# Patient Record
Sex: MALE | Race: WHITE | NOT HISPANIC OR LATINO | Employment: FULL TIME | URBAN - METROPOLITAN AREA
[De-identification: names, ages, dates, MRNs, and addresses within clinical notes are randomized per-mention and may not be internally consistent; named-entity substitution may affect disease eponyms.]

---

## 2017-01-17 RX ORDER — BENAZEPRIL HYDROCHLORIDE 20 MG/1
TABLET ORAL
Qty: 30 TABLET | Refills: 0 | Status: SHIPPED | OUTPATIENT
Start: 2017-01-17 | End: 2017-02-26 | Stop reason: SDUPTHER

## 2017-02-02 RX ORDER — BENAZEPRIL HYDROCHLORIDE 20 MG/1
TABLET ORAL
Qty: 30 TABLET | Refills: 0 | OUTPATIENT
Start: 2017-02-02

## 2017-02-27 RX ORDER — BENAZEPRIL HYDROCHLORIDE 20 MG/1
TABLET ORAL
Qty: 30 TABLET | Refills: 0 | Status: SHIPPED | OUTPATIENT
Start: 2017-02-27 | End: 2018-05-03 | Stop reason: ALTCHOICE

## 2017-09-05 ENCOUNTER — OFFICE VISIT (OUTPATIENT)
Dept: SPORTS MEDICINE | Facility: CLINIC | Age: 62
End: 2017-09-05

## 2017-09-05 VITALS
OXYGEN SATURATION: 98 % | BODY MASS INDEX: 28.14 KG/M2 | DIASTOLIC BLOOD PRESSURE: 84 MMHG | SYSTOLIC BLOOD PRESSURE: 112 MMHG | WEIGHT: 201 LBS | HEIGHT: 71 IN | HEART RATE: 78 BPM

## 2017-09-05 DIAGNOSIS — E78.00 PURE HYPERCHOLESTEROLEMIA: ICD-10-CM

## 2017-09-05 DIAGNOSIS — Z11.59 NEED FOR HEPATITIS C SCREENING TEST: ICD-10-CM

## 2017-09-05 DIAGNOSIS — I10 ESSENTIAL HYPERTENSION: ICD-10-CM

## 2017-09-05 DIAGNOSIS — Z12.11 SCREEN FOR COLON CANCER: ICD-10-CM

## 2017-09-05 DIAGNOSIS — E11.9 TYPE 2 DIABETES MELLITUS WITHOUT COMPLICATION, WITHOUT LONG-TERM CURRENT USE OF INSULIN (HCC): ICD-10-CM

## 2017-09-05 DIAGNOSIS — Z00.00 PREVENTATIVE HEALTH CARE: Primary | ICD-10-CM

## 2017-09-05 PROBLEM — K63.5 COLON POLYP: Status: ACTIVE | Noted: 2017-09-05

## 2017-09-05 PROBLEM — E78.5 HYPERLIPIDEMIA: Status: ACTIVE | Noted: 2017-09-05

## 2017-09-05 PROCEDURE — 99396 PREV VISIT EST AGE 40-64: CPT | Performed by: FAMILY MEDICINE

## 2017-09-05 RX ORDER — EMPAGLIFLOZIN, METFORMIN HYDROCHLORIDE 12.5; 1 MG/1; MG/1
2 TABLET, EXTENDED RELEASE ORAL DAILY
COMMUNITY
Start: 2017-08-04 | End: 2019-04-06 | Stop reason: ALTCHOICE

## 2017-09-05 RX ORDER — ATORVASTATIN CALCIUM 20 MG/1
20 TABLET, FILM COATED ORAL DAILY
COMMUNITY
Start: 2017-08-04

## 2017-09-05 RX ORDER — GLIMEPIRIDE 2 MG/1
2 TABLET ORAL 2 TIMES DAILY
COMMUNITY
Start: 2017-08-04

## 2017-09-05 NOTE — PROGRESS NOTES
Billy Bourne is here today for an annual physical exam.     Eating a healthy diet. Exercising routinely.     Sees Dr Figueroa for AODM    I have reviewed the patient's medical, family, and social history in detail and updated the computerized patient record.    Screening history:  Colonoscopy - due  Prostate - last year  Metabolic - last year    Health Maintenance   Topic Date Due   • TDAP/TD VACCINES (1 - Tdap) 09/16/1974   • INFLUENZA VACCINE  09/01/2017   • HEPATITIS C SCREENING  09/05/2017   • COLONOSCOPY  09/05/2017   • ZOSTER VACCINE  09/05/2017   • HEMOGLOBIN A1C  02/17/2018   • DIABETIC FOOT EXAM  08/17/2018   • LIPID PANEL  08/17/2018   • DIABETIC EYE EXAM  08/17/2018   • URINE MICROALBUMIN  08/17/2018   • PNEUMOCOCCAL VACCINE (19-64 MEDIUM RISK)  Completed       Review of Systems   Constitutional: Negative for activity change, appetite change, chills, diaphoresis, fatigue, fever and unexpected weight change.   HENT: Negative for congestion, ear pain, postnasal drip, rhinorrhea, sinus pressure, sneezing, sore throat and trouble swallowing.    Eyes: Negative for visual disturbance.   Respiratory: Negative for cough, chest tightness, shortness of breath and wheezing.    Cardiovascular: Negative for chest pain and palpitations.   Gastrointestinal: Negative for abdominal pain, blood in stool, nausea and vomiting.   Endocrine: Negative for cold intolerance, polydipsia, polyphagia and polyuria.   Genitourinary: Negative for dysuria, flank pain, frequency, hematuria and urgency.   Musculoskeletal: Negative for arthralgias, back pain, joint swelling and myalgias.   Skin: Negative for rash.   Allergic/Immunologic: Negative for environmental allergies.   Neurological: Negative for dizziness, syncope, weakness, numbness and headaches.   Hematological: Negative for adenopathy. Does not bruise/bleed easily.   Psychiatric/Behavioral: Negative for agitation, decreased concentration, dysphoric mood, sleep disturbance and  "suicidal ideas. The patient is not nervous/anxious.        /84  Pulse 78  Ht 71\" (180.3 cm)  Wt 201 lb (91.2 kg)  SpO2 98%  BMI 28.03 kg/m2     Physical Exam    Vital signs reviewed.  General appearance: No acute distress  Eyes: conjunctiva clear without erythema; pupils equally round and reactive  ENT: external ears and nose normal; hearing normal, oropharynx clear  Neck: supple; no thyromegaly  CV: normal rate and rhythm; no peripheral edema  Respiratory: normal respiratory effort; lungs clear to auscultation bilaterally  MSK: normal gait and station; no focal joint deformity or swelling  Skin: no rash or wounds; normal turgor  Neuro: cranial nerves 2-12 grossly intact; normal sensation to light touch  Psych: mood and affect normal; recent and remote memory intact    Review labs from [facility] 8/17/2017.     Diagnoses and all orders for this visit:    Preventative health care    Pure hypercholesterolemia  -     TSH+Free T4    Essential hypertension  -     TSH+Free T4  -     Urinalysis With / Culture If Indicated    Type 2 diabetes mellitus without complication, without long-term current use of insulin  -     TSH+Free T4    Need for hepatitis C screening test  -     Hepatitis C Antibody    Screen for colon cancer    Other orders  -     Cancel: CBC & Differential  -     Cancel: Comprehensive Metabolic Panel  -     Cancel: Lipid Panel With / Chol / HDL Ratio        Encourage healthy diet and exercise.  Encourage patient to stay up to date on screening examinations as indicated based on age and risk factors.  "

## 2017-09-06 LAB
APPEARANCE UR: CLEAR
BACTERIA #/AREA URNS HPF: NORMAL /HPF
BILIRUB UR QL STRIP: NEGATIVE
COLOR UR: YELLOW
EPI CELLS #/AREA URNS HPF: NORMAL /HPF
GLUCOSE UR QL: ABNORMAL
HCV AB S/CO SERPL IA: <0.1 S/CO RATIO (ref 0–0.9)
HGB UR QL STRIP: NEGATIVE
KETONES UR QL STRIP: NEGATIVE
LEUKOCYTE ESTERASE UR QL STRIP: NEGATIVE
MICRO URNS: ABNORMAL
MICRO URNS: ABNORMAL
MUCOUS THREADS URNS QL MICRO: PRESENT /HPF
NITRITE UR QL STRIP: NEGATIVE
PH UR STRIP: 6 [PH] (ref 5–7.5)
PROT UR QL STRIP: ABNORMAL
PSA SERPL-MCNC: 2.62 NG/ML (ref 0–4)
RBC #/AREA URNS HPF: NORMAL /HPF
SP GR UR: >=1.03 (ref 1–1.03)
T4 FREE SERPL-MCNC: 1.08 NG/DL (ref 0.93–1.7)
TSH SERPL DL<=0.005 MIU/L-ACNC: 3.47 MIU/ML (ref 0.27–4.2)
URINALYSIS REFLEX: ABNORMAL
UROBILINOGEN UR STRIP-MCNC: 0.2 MG/DL (ref 0.2–1)
WBC #/AREA URNS HPF: NORMAL /HPF

## 2017-10-29 ENCOUNTER — APPOINTMENT (OUTPATIENT)
Dept: CT IMAGING | Facility: HOSPITAL | Age: 62
End: 2017-10-29

## 2017-10-29 ENCOUNTER — HOSPITAL ENCOUNTER (EMERGENCY)
Facility: HOSPITAL | Age: 62
Discharge: HOME | End: 2017-10-29
Attending: EMERGENCY MEDICINE | Admitting: EMERGENCY MEDICINE

## 2017-10-29 VITALS
WEIGHT: 195 LBS | HEART RATE: 101 BPM | DIASTOLIC BLOOD PRESSURE: 121 MMHG | TEMPERATURE: 99.2 F | RESPIRATION RATE: 18 BRPM | OXYGEN SATURATION: 95 % | BODY MASS INDEX: 27.3 KG/M2 | HEIGHT: 71 IN | SYSTOLIC BLOOD PRESSURE: 188 MMHG

## 2017-10-29 DIAGNOSIS — R56.9 SEIZURE (HCC): Primary | ICD-10-CM

## 2017-10-29 DIAGNOSIS — S00.01XA ABRASION OF SCALP, INITIAL ENCOUNTER: ICD-10-CM

## 2017-10-29 LAB
ALBUMIN SERPL-MCNC: 4.6 G/DL (ref 3.5–5.2)
ALBUMIN/GLOB SERPL: 1.7 G/DL
ALP SERPL-CCNC: 68 U/L (ref 39–117)
ALT SERPL W P-5'-P-CCNC: 26 U/L (ref 1–41)
ANION GAP SERPL CALCULATED.3IONS-SCNC: 19.1 MMOL/L
AST SERPL-CCNC: 23 U/L (ref 1–40)
BASOPHILS # BLD AUTO: 0.02 10*3/MM3 (ref 0–0.2)
BASOPHILS NFR BLD AUTO: 0.2 % (ref 0–1.5)
BILIRUB SERPL-MCNC: 0.4 MG/DL (ref 0.1–1.2)
BUN BLD-MCNC: 16 MG/DL (ref 8–23)
BUN/CREAT SERPL: 17.4 (ref 7–25)
CALCIUM SPEC-SCNC: 9.2 MG/DL (ref 8.6–10.5)
CHLORIDE SERPL-SCNC: 101 MMOL/L (ref 98–107)
CO2 SERPL-SCNC: 21.9 MMOL/L (ref 22–29)
CREAT BLD-MCNC: 0.92 MG/DL (ref 0.76–1.27)
DEPRECATED RDW RBC AUTO: 43 FL (ref 37–54)
EOSINOPHIL # BLD AUTO: 0.06 10*3/MM3 (ref 0–0.7)
EOSINOPHIL NFR BLD AUTO: 0.6 % (ref 0.3–6.2)
ERYTHROCYTE [DISTWIDTH] IN BLOOD BY AUTOMATED COUNT: 13.4 % (ref 11.5–14.5)
GFR SERPL CREATININE-BSD FRML MDRD: 83 ML/MIN/1.73
GLOBULIN UR ELPH-MCNC: 2.7 GM/DL
GLUCOSE BLD-MCNC: 135 MG/DL (ref 65–99)
HCT VFR BLD AUTO: 44 % (ref 40.4–52.2)
HGB BLD-MCNC: 14.9 G/DL (ref 13.7–17.6)
HOLD SPECIMEN: NORMAL
HOLD SPECIMEN: NORMAL
IMM GRANULOCYTES # BLD: 0.03 10*3/MM3 (ref 0–0.03)
IMM GRANULOCYTES NFR BLD: 0.3 % (ref 0–0.5)
LYMPHOCYTES # BLD AUTO: 2.32 10*3/MM3 (ref 0.9–4.8)
LYMPHOCYTES NFR BLD AUTO: 23.6 % (ref 19.6–45.3)
MAGNESIUM SERPL-MCNC: 2.1 MG/DL (ref 1.6–2.4)
MCH RBC QN AUTO: 29.9 PG (ref 27–32.7)
MCHC RBC AUTO-ENTMCNC: 33.9 G/DL (ref 32.6–36.4)
MCV RBC AUTO: 88.4 FL (ref 79.8–96.2)
MONOCYTES # BLD AUTO: 0.57 10*3/MM3 (ref 0.2–1.2)
MONOCYTES NFR BLD AUTO: 5.8 % (ref 5–12)
NEUTROPHILS # BLD AUTO: 6.83 10*3/MM3 (ref 1.9–8.1)
NEUTROPHILS NFR BLD AUTO: 69.5 % (ref 42.7–76)
PHOSPHATE SERPL-MCNC: 3.5 MG/DL (ref 2.5–4.5)
PLATELET # BLD AUTO: 190 10*3/MM3 (ref 140–500)
PMV BLD AUTO: 11.3 FL (ref 6–12)
POTASSIUM BLD-SCNC: 3.7 MMOL/L (ref 3.5–5.2)
PROT SERPL-MCNC: 7.3 G/DL (ref 6–8.5)
RBC # BLD AUTO: 4.98 10*6/MM3 (ref 4.6–6)
SODIUM BLD-SCNC: 142 MMOL/L (ref 136–145)
TROPONIN T SERPL-MCNC: 0.02 NG/ML (ref 0–0.03)
WBC NRBC COR # BLD: 9.83 10*3/MM3 (ref 4.5–10.7)
WHOLE BLOOD HOLD SPECIMEN: NORMAL
WHOLE BLOOD HOLD SPECIMEN: NORMAL

## 2017-10-29 PROCEDURE — 93005 ELECTROCARDIOGRAM TRACING: CPT

## 2017-10-29 PROCEDURE — 99285 EMERGENCY DEPT VISIT HI MDM: CPT

## 2017-10-29 PROCEDURE — 93010 ELECTROCARDIOGRAM REPORT: CPT | Performed by: INTERNAL MEDICINE

## 2017-10-29 PROCEDURE — 80053 COMPREHEN METABOLIC PANEL: CPT | Performed by: EMERGENCY MEDICINE

## 2017-10-29 PROCEDURE — 83735 ASSAY OF MAGNESIUM: CPT | Performed by: EMERGENCY MEDICINE

## 2017-10-29 PROCEDURE — 84100 ASSAY OF PHOSPHORUS: CPT | Performed by: EMERGENCY MEDICINE

## 2017-10-29 PROCEDURE — 70450 CT HEAD/BRAIN W/O DYE: CPT

## 2017-10-29 PROCEDURE — 85025 COMPLETE CBC W/AUTO DIFF WBC: CPT | Performed by: EMERGENCY MEDICINE

## 2017-10-29 PROCEDURE — 84484 ASSAY OF TROPONIN QUANT: CPT | Performed by: EMERGENCY MEDICINE

## 2017-10-29 RX ORDER — SODIUM CHLORIDE 0.9 % (FLUSH) 0.9 %
10 SYRINGE (ML) INJECTION AS NEEDED
Status: DISCONTINUED | OUTPATIENT
Start: 2017-10-29 | End: 2017-10-30 | Stop reason: HOSPADM

## 2017-10-30 ENCOUNTER — OFFICE VISIT (OUTPATIENT)
Dept: SPORTS MEDICINE | Facility: CLINIC | Age: 62
End: 2017-10-30

## 2017-10-30 VITALS
HEIGHT: 71 IN | OXYGEN SATURATION: 96 % | HEART RATE: 94 BPM | BODY MASS INDEX: 28 KG/M2 | SYSTOLIC BLOOD PRESSURE: 114 MMHG | DIASTOLIC BLOOD PRESSURE: 80 MMHG | WEIGHT: 200 LBS

## 2017-10-30 DIAGNOSIS — R55 SYNCOPE, UNSPECIFIED SYNCOPE TYPE: Primary | ICD-10-CM

## 2017-10-30 PROCEDURE — 99214 OFFICE O/P EST MOD 30 MIN: CPT | Performed by: FAMILY MEDICINE

## 2017-10-30 NOTE — PROGRESS NOTES
"Billy is a 62 y.o. year old male    [facility]    History of Present Illness   HPI   Patient is here to follow-up ER visit from yesterday.  Yesterday patient was in his usual state of health, was at the mall and as he was coming down the escalator he does recall having some trouble focusing his vision, after that he does not remember much but evidently he had a possible syncopal episode.  EMS was called, when they arrived patient was flailing his arms and legs and asking him what had happened and what time it was.  He did not recall fighting his tongue, does not have a sore tongue, no bleeding.  There was no evidence of bowel or bladder incontinence.  Patient was taken to the emergency room, I have reviewed those records and labs including CT scan and all of which were within normal limits.  I did discuss with the ER physician yesterday to have the patient follow-up with us here and he also will be making his own appointment with neurology.  Patient does not recall much about the incident itself.  Patient has had previous syncopal workup that was negative.  Patient states he feels fine now.  At the mall there evidently was \"a doctor \"that related to EMS that saw seizure activity but he did not stay for EMS to get there. Not sure if he actually was an MD.     I have reviewed the patient's medical, family, and social history in detail and updated the computerized patient record.    Review of Systems   Constitutional: Negative.    HENT: Negative.    Respiratory: Negative.    Cardiovascular: Negative.    Gastrointestinal: Negative.    Genitourinary: Negative.    Neurological:        Per history of present illness       /80  Pulse 94  Ht 71\" (180.3 cm)  Wt 200 lb (90.7 kg)  SpO2 96%  BMI 27.89 kg/m2     Physical Exam   Constitutional: He is oriented to person, place, and time. He appears well-developed and well-nourished.   HENT:   Head: Normocephalic and atraumatic.   Bilateral cerumen impaction.    Oropharynx, " there is evidence of some teeth trauma on the lateral edge of the left side of the tongue but he states this is not sore nor has it bled.   Eyes: Conjunctivae and EOM are normal. Pupils are equal, round, and reactive to light.   Neck: Normal range of motion. Neck supple. No thyromegaly present.   Cardiovascular: Normal rate, regular rhythm and normal heart sounds.    No peripheral edema   Pulmonary/Chest: Effort normal and breath sounds normal.   Lymphadenopathy:     He has no cervical adenopathy.   Neurological: He is alert and oriented to person, place, and time.   Skin: Skin is warm, dry and intact.   Psychiatric: He has a normal mood and affect. His behavior is normal. Thought content normal.   Vitals reviewed.       Diagnoses and all orders for this visit:    Syncope, unspecified syncope type  -     Ambulatory Referral to Cardiology  -     EEG; Future      It would be really difficult to say for sure what this episode was, whether he had a syncopal episode that may have caused a brief seizure-like activity or if it was a seizure itself.  We'll refer to cardiology for workup of syncope but also he will call his neurologist for an appointment.    EMR Dragon/Transcription disclaimer:    Much of this encounter note is an electronic transcription/translation of spoken language to printed text.  The electronic translation of spoken language may permit erroneous, or at times, nonsensical words or phrases to be inadvertently transcribed.  Although I have reviewed the note for such errors some may still exist.

## 2017-11-01 ENCOUNTER — TELEPHONE (OUTPATIENT)
Dept: SOCIAL WORK | Facility: HOSPITAL | Age: 62
End: 2017-11-01

## 2017-11-01 NOTE — TELEPHONE ENCOUNTER
Spoke with pt today in f/u and he states he is better. He had a f/u with his PCP this Monday, has a f/u with his cardiologist Dec. 1 and is waiting for Dr. Lazo's office to call back with his f/u appt there. No other questions or concerns voiced by pt at this time. Holli ASENCIO

## 2017-11-02 DIAGNOSIS — R55 SYNCOPE, UNSPECIFIED SYNCOPE TYPE: Primary | ICD-10-CM

## 2017-11-07 ENCOUNTER — TELEPHONE (OUTPATIENT)
Dept: SPORTS MEDICINE | Facility: CLINIC | Age: 62
End: 2017-11-07

## 2017-11-07 NOTE — TELEPHONE ENCOUNTER
Patient needs an order for an EEG before Neuro will see him. Can you please input that for him?

## 2017-11-10 ENCOUNTER — TELEPHONE (OUTPATIENT)
Dept: SPORTS MEDICINE | Facility: CLINIC | Age: 62
End: 2017-11-10

## 2017-11-10 DIAGNOSIS — R55 SYNCOPE, UNSPECIFIED SYNCOPE TYPE: Primary | ICD-10-CM

## 2017-11-10 NOTE — TELEPHONE ENCOUNTER
Can you get EEG scheduled asap, Dr. Stuart accidentally put the order in as clinic performed instead of hospital performed, neuro is waiting for this to be done first before they will see him.

## 2017-11-14 ENCOUNTER — APPOINTMENT (OUTPATIENT)
Dept: NEUROLOGY | Facility: HOSPITAL | Age: 62
End: 2017-11-14

## 2017-12-01 ENCOUNTER — OFFICE VISIT (OUTPATIENT)
Dept: CARDIOLOGY | Facility: CLINIC | Age: 62
End: 2017-12-01

## 2017-12-01 VITALS
SYSTOLIC BLOOD PRESSURE: 140 MMHG | WEIGHT: 199 LBS | HEIGHT: 71 IN | BODY MASS INDEX: 27.86 KG/M2 | DIASTOLIC BLOOD PRESSURE: 88 MMHG | HEART RATE: 86 BPM

## 2017-12-01 DIAGNOSIS — R55 VASOVAGAL SYNCOPE: Primary | ICD-10-CM

## 2017-12-01 PROCEDURE — 99204 OFFICE O/P NEW MOD 45 MIN: CPT | Performed by: INTERNAL MEDICINE

## 2017-12-01 PROCEDURE — 93000 ELECTROCARDIOGRAM COMPLETE: CPT | Performed by: INTERNAL MEDICINE

## 2017-12-04 DIAGNOSIS — R97.20 RISING PSA LEVEL: Primary | ICD-10-CM

## 2017-12-07 ENCOUNTER — HOSPITAL ENCOUNTER (OUTPATIENT)
Dept: CARDIOLOGY | Facility: HOSPITAL | Age: 62
Discharge: HOME | End: 2017-12-07
Attending: INTERNAL MEDICINE | Admitting: INTERNAL MEDICINE

## 2017-12-07 VITALS
HEART RATE: 75 BPM | BODY MASS INDEX: 28.41 KG/M2 | DIASTOLIC BLOOD PRESSURE: 70 MMHG | WEIGHT: 198.41 LBS | SYSTOLIC BLOOD PRESSURE: 118 MMHG | HEIGHT: 70 IN

## 2017-12-07 DIAGNOSIS — R55 VASOVAGAL SYNCOPE: ICD-10-CM

## 2017-12-07 LAB
BH CV ECHO MEAS - ACS: 1.9 CM
BH CV ECHO MEAS - AO MAX PG (FULL): 5.2 MMHG
BH CV ECHO MEAS - AO MAX PG: 10.9 MMHG
BH CV ECHO MEAS - AO MEAN PG (FULL): 2.4 MMHG
BH CV ECHO MEAS - AO MEAN PG: 6 MMHG
BH CV ECHO MEAS - AO ROOT AREA (BSA CORRECTED): 1.5
BH CV ECHO MEAS - AO ROOT AREA: 7.9 CM^2
BH CV ECHO MEAS - AO ROOT DIAM: 3.2 CM
BH CV ECHO MEAS - AO V2 MAX: 164.8 CM/SEC
BH CV ECHO MEAS - AO V2 MEAN: 113.2 CM/SEC
BH CV ECHO MEAS - AO V2 VTI: 29.9 CM
BH CV ECHO MEAS - AVA(I,A): 1.9 CM^2
BH CV ECHO MEAS - AVA(I,D): 1.9 CM^2
BH CV ECHO MEAS - AVA(V,A): 1.8 CM^2
BH CV ECHO MEAS - AVA(V,D): 1.8 CM^2
BH CV ECHO MEAS - BSA(HAYCOCK): 2.1 M^2
BH CV ECHO MEAS - BSA: 2.1 M^2
BH CV ECHO MEAS - BZI_BMI: 28.8 KILOGRAMS/M^2
BH CV ECHO MEAS - BZI_METRIC_HEIGHT: 177 CM
BH CV ECHO MEAS - BZI_METRIC_WEIGHT: 90.3 KG
BH CV ECHO MEAS - CONTRAST EF (2CH): 59.1 ML/M^2
BH CV ECHO MEAS - CONTRAST EF 4CH: 52.5 ML/M^2
BH CV ECHO MEAS - EDV(MOD-SP2): 66 ML
BH CV ECHO MEAS - EDV(MOD-SP4): 61 ML
BH CV ECHO MEAS - EDV(TEICH): 105.5 ML
BH CV ECHO MEAS - EF(CUBED): 73 %
BH CV ECHO MEAS - EF(MOD-SP2): 59.1 %
BH CV ECHO MEAS - EF(MOD-SP4): 52.5 %
BH CV ECHO MEAS - EF(TEICH): 64.7 %
BH CV ECHO MEAS - ESV(MOD-SP2): 27 ML
BH CV ECHO MEAS - ESV(MOD-SP4): 29 ML
BH CV ECHO MEAS - ESV(TEICH): 37.3 ML
BH CV ECHO MEAS - FS: 35.3 %
BH CV ECHO MEAS - IVS/LVPW: 1.1
BH CV ECHO MEAS - IVSD: 1.4 CM
BH CV ECHO MEAS - LAT PEAK E' VEL: 8 CM/SEC
BH CV ECHO MEAS - LV DIASTOLIC VOL/BSA (35-75): 29.4 ML/M^2
BH CV ECHO MEAS - LV MASS(C)D: 259.2 GRAMS
BH CV ECHO MEAS - LV MASS(C)DI: 124.8 GRAMS/M^2
BH CV ECHO MEAS - LV MAX PG: 5.6 MMHG
BH CV ECHO MEAS - LV MEAN PG: 3.6 MMHG
BH CV ECHO MEAS - LV SYSTOLIC VOL/BSA (12-30): 14 ML/M^2
BH CV ECHO MEAS - LV V1 MAX: 118.8 CM/SEC
BH CV ECHO MEAS - LV V1 MEAN: 89.5 CM/SEC
BH CV ECHO MEAS - LV V1 VTI: 23 CM
BH CV ECHO MEAS - LVIDD: 4.8 CM
BH CV ECHO MEAS - LVIDS: 3.1 CM
BH CV ECHO MEAS - LVLD AP2: 7.1 CM
BH CV ECHO MEAS - LVLD AP4: 7.1 CM
BH CV ECHO MEAS - LVLS AP2: 6.7 CM
BH CV ECHO MEAS - LVLS AP4: 6.7 CM
BH CV ECHO MEAS - LVOT AREA (M): 2.5 CM^2
BH CV ECHO MEAS - LVOT AREA: 2.4 CM^2
BH CV ECHO MEAS - LVOT DIAM: 1.8 CM
BH CV ECHO MEAS - LVPWD: 1.3 CM
BH CV ECHO MEAS - MED PEAK E' VEL: 8 CM/SEC
BH CV ECHO MEAS - MV A DUR: 0.08 SEC
BH CV ECHO MEAS - MV A MAX VEL: 50.9 CM/SEC
BH CV ECHO MEAS - MV DEC SLOPE: 674.7 CM/SEC^2
BH CV ECHO MEAS - MV DEC TIME: 0.14 SEC
BH CV ECHO MEAS - MV E MAX VEL: 93.6 CM/SEC
BH CV ECHO MEAS - MV E/A: 1.8
BH CV ECHO MEAS - MV MAX PG: 9.7 MMHG
BH CV ECHO MEAS - MV MEAN PG: 2.8 MMHG
BH CV ECHO MEAS - MV P1/2T MAX VEL: 93.6 CM/SEC
BH CV ECHO MEAS - MV P1/2T: 40.6 MSEC
BH CV ECHO MEAS - MV V2 MAX: 156.1 CM/SEC
BH CV ECHO MEAS - MV V2 MEAN: 74.3 CM/SEC
BH CV ECHO MEAS - MV V2 VTI: 30 CM
BH CV ECHO MEAS - MVA P1/2T LCG: 2.4 CM^2
BH CV ECHO MEAS - MVA(P1/2T): 5.4 CM^2
BH CV ECHO MEAS - MVA(VTI): 1.9 CM^2
BH CV ECHO MEAS - PA MAX PG (FULL): 0.34 MMHG
BH CV ECHO MEAS - PA MAX PG: 2.5 MMHG
BH CV ECHO MEAS - PA V2 MAX: 78.3 CM/SEC
BH CV ECHO MEAS - PULM A REVS DUR: 0.08 SEC
BH CV ECHO MEAS - PULM A REVS VEL: 23.8 CM/SEC
BH CV ECHO MEAS - PULM DIAS VEL: 91.9 CM/SEC
BH CV ECHO MEAS - PULM S/D: 0.44
BH CV ECHO MEAS - PULM SYS VEL: 40 CM/SEC
BH CV ECHO MEAS - PVA(V,A): 2.8 CM^2
BH CV ECHO MEAS - PVA(V,D): 2.8 CM^2
BH CV ECHO MEAS - QP/QS: 0.82
BH CV ECHO MEAS - RV MAX PG: 2.1 MMHG
BH CV ECHO MEAS - RV MEAN PG: 1.3 MMHG
BH CV ECHO MEAS - RV V1 MAX: 72.8 CM/SEC
BH CV ECHO MEAS - RV V1 MEAN: 51.8 CM/SEC
BH CV ECHO MEAS - RV V1 VTI: 15.5 CM
BH CV ECHO MEAS - RVOT AREA: 3 CM^2
BH CV ECHO MEAS - RVOT DIAM: 1.9 CM
BH CV ECHO MEAS - SI(AO): 113.9 ML/M^2
BH CV ECHO MEAS - SI(CUBED): 37.9 ML/M^2
BH CV ECHO MEAS - SI(LVOT): 27 ML/M^2
BH CV ECHO MEAS - SI(MOD-SP2): 18.8 ML/M^2
BH CV ECHO MEAS - SI(MOD-SP4): 15.4 ML/M^2
BH CV ECHO MEAS - SI(TEICH): 32.9 ML/M^2
BH CV ECHO MEAS - SUP REN AO DIAM: 2 CM
BH CV ECHO MEAS - SV(AO): 236.4 ML
BH CV ECHO MEAS - SV(CUBED): 78.7 ML
BH CV ECHO MEAS - SV(LVOT): 56.1 ML
BH CV ECHO MEAS - SV(MOD-SP2): 39 ML
BH CV ECHO MEAS - SV(MOD-SP4): 32 ML
BH CV ECHO MEAS - SV(RVOT): 46.1 ML
BH CV ECHO MEAS - SV(TEICH): 68.2 ML
BH CV ECHO MEAS - TAPSE (>1.6): 1.9 CM2
BH CV XLRA - RV BASE: 2.8 CM
BH CV XLRA - TDI S': 16 CM/SEC
E/E' RATIO: 11
LEFT ATRIUM VOLUME INDEX: 37 ML/M2
LEFT ATRIUM VOLUME: 77 CM3
MAXIMAL PREDICTED HEART RATE: 158 BPM
STRESS TARGET HR: 134 BPM

## 2017-12-07 PROCEDURE — 0399T HC MYOCARDL STRAIN IMAG QUAN ASSMT PER SESS: CPT

## 2017-12-07 PROCEDURE — 0399T ADULT TRANSTHORACIC ECHO COMPLETE W/ CONT IF NECESSARY PER PROTOCOL: CPT | Performed by: INTERNAL MEDICINE

## 2017-12-07 PROCEDURE — 93306 TTE W/DOPPLER COMPLETE: CPT

## 2017-12-07 PROCEDURE — 93306 TTE W/DOPPLER COMPLETE: CPT | Performed by: INTERNAL MEDICINE

## 2017-12-07 NOTE — PROGRESS NOTES
No cause for syncope.  Mild to moderate mitral regurgitation.  Needs follow-up in one year. Patient is aware

## 2017-12-09 ENCOUNTER — RESULTS ENCOUNTER (OUTPATIENT)
Dept: SPORTS MEDICINE | Facility: CLINIC | Age: 62
End: 2017-12-09

## 2017-12-09 DIAGNOSIS — R97.20 RISING PSA LEVEL: ICD-10-CM

## 2018-01-05 ENCOUNTER — TELEPHONE (OUTPATIENT)
Dept: CARDIOLOGY | Facility: CLINIC | Age: 63
End: 2018-01-05

## 2018-01-05 NOTE — TELEPHONE ENCOUNTER
----- Message from Wm Deleon MD sent at 12/27/2017  2:43 PM EST -----  Please tell patient no rhythm cause for passing out on monitor

## 2018-04-20 ENCOUNTER — OFFICE VISIT (OUTPATIENT)
Dept: FAMILY MEDICINE CLINIC | Facility: CLINIC | Age: 63
End: 2018-04-20

## 2018-04-20 VITALS
HEART RATE: 81 BPM | WEIGHT: 198.4 LBS | BODY MASS INDEX: 27.77 KG/M2 | DIASTOLIC BLOOD PRESSURE: 84 MMHG | HEIGHT: 71 IN | OXYGEN SATURATION: 99 % | SYSTOLIC BLOOD PRESSURE: 148 MMHG

## 2018-04-20 DIAGNOSIS — R06.09 DOE (DYSPNEA ON EXERTION): ICD-10-CM

## 2018-04-20 DIAGNOSIS — R55 VASOVAGAL SYNCOPE: ICD-10-CM

## 2018-04-20 DIAGNOSIS — R05.9 COUGH: ICD-10-CM

## 2018-04-20 DIAGNOSIS — I10 ESSENTIAL HYPERTENSION: Primary | ICD-10-CM

## 2018-04-20 LAB
BASOPHILS # BLD AUTO: 0.02 10*3/MM3 (ref 0–0.2)
BASOPHILS NFR BLD AUTO: 0.3 % (ref 0–1.5)
EOSINOPHIL # BLD AUTO: 0.09 10*3/MM3 (ref 0–0.7)
EOSINOPHIL NFR BLD AUTO: 1.2 % (ref 0.3–6.2)
ERYTHROCYTE [DISTWIDTH] IN BLOOD BY AUTOMATED COUNT: 14.6 % (ref 11.5–14.5)
HCT VFR BLD AUTO: 47.2 % (ref 40.4–52.2)
HGB BLD-MCNC: 14.9 G/DL (ref 13.7–17.6)
IMM GRANULOCYTES # BLD: 0 10*3/MM3 (ref 0–0.03)
IMM GRANULOCYTES NFR BLD: 0 % (ref 0–0.5)
LYMPHOCYTES # BLD AUTO: 1.67 10*3/MM3 (ref 0.9–4.8)
LYMPHOCYTES NFR BLD AUTO: 21.5 % (ref 19.6–45.3)
MCH RBC QN AUTO: 29.3 PG (ref 27–32.7)
MCHC RBC AUTO-ENTMCNC: 31.6 G/DL (ref 32.6–36.4)
MCV RBC AUTO: 92.9 FL (ref 79.8–96.2)
MONOCYTES # BLD AUTO: 0.6 10*3/MM3 (ref 0.2–1.2)
MONOCYTES NFR BLD AUTO: 7.7 % (ref 5–12)
NEUTROPHILS # BLD AUTO: 5.4 10*3/MM3 (ref 1.9–8.1)
NEUTROPHILS NFR BLD AUTO: 69.3 % (ref 42.7–76)
PLATELET # BLD AUTO: 208 10*3/MM3 (ref 140–500)
RBC # BLD AUTO: 5.08 10*6/MM3 (ref 4.6–6)
WBC # BLD AUTO: 7.78 10*3/MM3 (ref 4.5–10.7)

## 2018-04-20 PROCEDURE — 93000 ELECTROCARDIOGRAM COMPLETE: CPT | Performed by: INTERNAL MEDICINE

## 2018-04-20 PROCEDURE — 99215 OFFICE O/P EST HI 40 MIN: CPT | Performed by: INTERNAL MEDICINE

## 2018-04-20 PROCEDURE — 71046 X-RAY EXAM CHEST 2 VIEWS: CPT | Performed by: INTERNAL MEDICINE

## 2018-04-20 RX ORDER — FLUCONAZOLE 150 MG/1
150 TABLET ORAL DAILY PRN
Refills: 0 | COMMUNITY
Start: 2018-03-14 | End: 2018-06-13

## 2018-05-03 RX ORDER — VALSARTAN 160 MG/1
160 TABLET ORAL DAILY
Qty: 30 TABLET | Refills: 2 | Status: SHIPPED | OUTPATIENT
Start: 2018-05-03 | End: 2018-06-13 | Stop reason: SDUPTHER

## 2018-05-22 ENCOUNTER — OFFICE VISIT (OUTPATIENT)
Dept: FAMILY MEDICINE CLINIC | Facility: CLINIC | Age: 63
End: 2018-05-22

## 2018-05-22 VITALS
DIASTOLIC BLOOD PRESSURE: 74 MMHG | BODY MASS INDEX: 27.89 KG/M2 | SYSTOLIC BLOOD PRESSURE: 120 MMHG | WEIGHT: 199.2 LBS | TEMPERATURE: 98.6 F | HEIGHT: 71 IN | OXYGEN SATURATION: 98 % | HEART RATE: 93 BPM

## 2018-05-22 DIAGNOSIS — J30.9 ALLERGIC RHINITIS, UNSPECIFIED CHRONICITY, UNSPECIFIED SEASONALITY, UNSPECIFIED TRIGGER: Primary | ICD-10-CM

## 2018-05-22 PROCEDURE — 99214 OFFICE O/P EST MOD 30 MIN: CPT | Performed by: NURSE PRACTITIONER

## 2018-05-22 RX ORDER — SILDENAFIL CITRATE 20 MG/1
TABLET ORAL
Refills: 5 | COMMUNITY
Start: 2018-04-27 | End: 2018-10-09

## 2018-05-22 RX ORDER — FLUTICASONE PROPIONATE 50 MCG
1 SPRAY, SUSPENSION (ML) NASAL 2 TIMES DAILY
Qty: 9.9 ML | Refills: 2 | Status: SHIPPED | OUTPATIENT
Start: 2018-05-22 | End: 2018-06-21

## 2018-05-22 NOTE — PROGRESS NOTES
Subjective   Billy Bourne is a 62 y.o. male.     Shortness of Breath     Mr. Bourne presents today with complaint of shortness of breath. He states he has had intermittent shortness of breath for more than a year now. He states he was worked up by cardiology last December (2017) and his stress test and echocardiogram were negative. He states the cardiologist told him he had a leaky valve that was mild and should not be the cause of his shortness of breath. He states his shortness of breath has been worsening over the past 4 to 6 weeks. He had seen Dr. Renae on 4/20/18 for this same complaint. He had a chest x-ray and EKG which did not show any active disease processes.  He is a patient of Dr. Renae, this is the first time I have seen him and this is an new problem to me.        Shortness of Breath   This is a recurrent problem. The current episode started more than 1 year ago. The problem occurs intermittently (with exertion). The problem has been gradually worsening. Associated symptoms include a fever (past 2 days felt warm), headaches (sinus headache) and rhinorrhea. Pertinent negatives include no chest pain, ear pain (itching), leg swelling, rash or wheezing. Sore throat: scratchy. The symptoms are aggravated by exercise and weather changes. The patient has no known risk factors for DVT/PE. He has tried nothing for the symptoms. His past medical history is significant for pneumonia. There is no history of allergies, CAD, chronic lung disease or COPD.     I have reviewed the patient's medical history in detail and updated the computerized patient record.    The following portions of the patient's history were reviewed and updated as appropriate: allergies, current medications, past family history, past medical history, past social history, past surgical history and problem list.       Current Outpatient Prescriptions:   •  atorvastatin (LIPITOR) 20 MG tablet, Take 20 mg by mouth Daily., Disp: , Rfl:    •  glimepiride (AMARYL) 2 MG tablet, Take 2 mg by mouth Every Morning Before Breakfast., Disp: , Rfl:   •  sildenafil (REVATIO) 20 MG tablet, 2-4 TABLET BY MOUTH AS NEEDED AS DIRECTED, Disp: , Rfl: 5  •  SYNJARDY XR 12.5-1000 MG tablet sustained-release 24 hour, Take 1 tablet by mouth Daily., Disp: , Rfl:   •  valsartan (DIOVAN) 160 MG tablet, Take 1 tablet by mouth Daily., Disp: 30 tablet, Rfl: 2  •  fluconazole (DIFLUCAN) 150 MG tablet, Take 150 mg by mouth Daily As Needed., Disp: , Rfl: 0  •  fluticasone (FLONASE) 50 MCG/ACT nasal spray, 1 spray into each nostril 2 (Two) Times a Day for 30 days. Administer 2 sprays in each nostril for each dose., Disp: 9.9 mL, Rfl: 2    Review of Systems   Constitutional: Positive for fever (past 2 days felt warm).   HENT: Positive for rhinorrhea. Negative for ear pain (itching). Sore throat: scratchy.    Respiratory: Positive for shortness of breath. Negative for wheezing.    Cardiovascular: Negative for chest pain and leg swelling.   Skin: Negative for rash.   Neurological: Positive for headaches (sinus headache).       Objective    Vitals:    05/22/18 1003   BP: 120/74   Pulse: 93   Temp: 98.6 °F (37 °C)   SpO2: 98%     Physical Exam    Assessment/Plan   Billy was seen today for shortness of breath.    Diagnoses and all orders for this visit:    Allergic rhinitis, unspecified chronicity, unspecified seasonality, unspecified trigger  -     fluticasone (FLONASE) 50 MCG/ACT nasal spray; 1 spray into each nostril 2 (Two) Times a Day for 30 days. Administer 2 sprays in each nostril for each dose.       His assessment is positive for nasal congestion and swelling. I am starting him on Claritin (OTC) 10 mg daily and also Flonase nasal spray twice a day. If this does not help with his shortness of breath and allergy symptoms I will consider starting Singulair or refer him to Asthma and allergy specialist for further evaluation.  He is to follow up with Dr. Van in 2 weeks as  scheduled for changes made with his antihypertensive medications.

## 2018-06-01 DIAGNOSIS — I10 ESSENTIAL HYPERTENSION: Primary | ICD-10-CM

## 2018-06-03 ENCOUNTER — RESULTS ENCOUNTER (OUTPATIENT)
Dept: FAMILY MEDICINE CLINIC | Facility: CLINIC | Age: 63
End: 2018-06-03

## 2018-06-03 DIAGNOSIS — I10 ESSENTIAL HYPERTENSION: ICD-10-CM

## 2018-06-05 ENCOUNTER — OFFICE VISIT (OUTPATIENT)
Dept: FAMILY MEDICINE CLINIC | Facility: CLINIC | Age: 63
End: 2018-06-05

## 2018-06-05 VITALS
DIASTOLIC BLOOD PRESSURE: 84 MMHG | SYSTOLIC BLOOD PRESSURE: 126 MMHG | BODY MASS INDEX: 28.68 KG/M2 | WEIGHT: 204.85 LBS | OXYGEN SATURATION: 96 % | HEIGHT: 71 IN | HEART RATE: 85 BPM

## 2018-06-05 DIAGNOSIS — E11.9 TYPE 2 DIABETES MELLITUS WITHOUT COMPLICATION, WITHOUT LONG-TERM CURRENT USE OF INSULIN (HCC): ICD-10-CM

## 2018-06-05 DIAGNOSIS — R06.09 DOE (DYSPNEA ON EXERTION): Primary | ICD-10-CM

## 2018-06-05 DIAGNOSIS — I10 ESSENTIAL HYPERTENSION: ICD-10-CM

## 2018-06-05 DIAGNOSIS — R60.0 LOWER EXTREMITY EDEMA: ICD-10-CM

## 2018-06-05 LAB
ALBUMIN SERPL-MCNC: 4.3 G/DL (ref 3.5–5.2)
ALBUMIN/GLOB SERPL: 1.7 G/DL
ALP SERPL-CCNC: 67 U/L (ref 39–117)
ALT SERPL-CCNC: 26 U/L (ref 1–41)
AST SERPL-CCNC: 13 U/L (ref 1–40)
BILIRUB SERPL-MCNC: 0.6 MG/DL (ref 0.1–1.2)
BUN SERPL-MCNC: 17 MG/DL (ref 8–23)
BUN/CREAT SERPL: 16.8 (ref 7–25)
CALCIUM SERPL-MCNC: 9.5 MG/DL (ref 8.6–10.5)
CHLORIDE SERPL-SCNC: 104 MMOL/L (ref 98–107)
CLARITY, POC: CLEAR
CO2 SERPL-SCNC: 26.7 MMOL/L (ref 22–29)
COLOR UR: ABNORMAL
CREAT SERPL-MCNC: 1.01 MG/DL (ref 0.76–1.27)
GFR SERPLBLD CREATININE-BSD FMLA CKD-EPI: 75 ML/MIN/1.73
GFR SERPLBLD CREATININE-BSD FMLA CKD-EPI: 91 ML/MIN/1.73
GLOBULIN SER CALC-MCNC: 2.5 GM/DL
GLUCOSE SERPL-MCNC: 109 MG/DL (ref 65–99)
GLUCOSE UR STRIP-MCNC: ABNORMAL MG/DL
KETONES UR QL: NEGATIVE
LEUKOCYTE EST, POC: NEGATIVE
NITRITE UR-MCNC: NEGATIVE MG/ML
PH UR: 5.5 [PH] (ref 5–8)
POC CREATININE URINE: NORMAL
POC MICROALBUMIN URINE: NORMAL
POTASSIUM SERPL-SCNC: 4.4 MMOL/L (ref 3.5–5.2)
PROT SERPL-MCNC: 6.8 G/DL (ref 6–8.5)
PROT UR STRIP-MCNC: ABNORMAL MG/DL
RBC # UR STRIP: ABNORMAL /UL
SODIUM SERPL-SCNC: 144 MMOL/L (ref 136–145)
SP GR UR: 1.02 (ref 1–1.03)

## 2018-06-05 PROCEDURE — 93000 ELECTROCARDIOGRAM COMPLETE: CPT | Performed by: INTERNAL MEDICINE

## 2018-06-05 PROCEDURE — 82044 UR ALBUMIN SEMIQUANTITATIVE: CPT | Performed by: INTERNAL MEDICINE

## 2018-06-05 PROCEDURE — 81003 URINALYSIS AUTO W/O SCOPE: CPT | Performed by: INTERNAL MEDICINE

## 2018-06-05 PROCEDURE — 99214 OFFICE O/P EST MOD 30 MIN: CPT | Performed by: INTERNAL MEDICINE

## 2018-06-05 RX ORDER — LORATADINE 10 MG/1
10 TABLET ORAL DAILY
COMMUNITY
End: 2019-04-07 | Stop reason: HOSPADM

## 2018-06-05 NOTE — PROGRESS NOTES
Subjective   Billy Bourne is a 62 y.o. male who presents today for:    Shortness of Breath and Joint Swelling (both ankles on Saturday)    History of Present Illness     AMOS started insidiously last year and had a stress test that was negative.  It worsened in 3/2018.  He recovers fairly quickly with rest, but it recurs fairly consistently.    He has also noticed some late-day swelling in the past week.     Chronic HTN and T2 DM have been treated with an ACE-I in the past.  We stopped it to see if his cough would improve, and it did, slightly.  He developed worsening allergies and has improved with the addition of Flonase.    Mr. Bourne  reports that he has never smoked. He has never used smokeless tobacco. He reports that he drinks alcohol. He reports that he does not use drugs.     No Known Allergies    Current Outpatient Prescriptions:   •  atorvastatin (LIPITOR) 20 MG tablet, Take 20 mg by mouth Daily., Disp: , Rfl:   •  fluconazole (DIFLUCAN) 150 MG tablet, Take 150 mg by mouth Daily As Needed., Disp: , Rfl: 0  •  fluticasone (FLONASE) 50 MCG/ACT nasal spray, 1 spray into each nostril 2 (Two) Times a Day for 30 days. Administer 2 sprays in each nostril for each dose., Disp: 9.9 mL, Rfl: 2  •  glimepiride (AMARYL) 2 MG tablet, Take 2 mg by mouth Every Morning Before Breakfast., Disp: , Rfl:   •  loratadine (CLARITIN) 10 MG tablet, Take 10 mg by mouth Daily., Disp: , Rfl:   •  sildenafil (REVATIO) 20 MG tablet, 2-4 TABLET BY MOUTH AS NEEDED AS DIRECTED, Disp: , Rfl: 5  •  SYNJARDY XR 12.5-1000 MG tablet sustained-release 24 hour, Take 1 tablet by mouth Daily., Disp: , Rfl:   •  valsartan (DIOVAN) 160 MG tablet, Take 1 tablet by mouth Daily., Disp: 30 tablet, Rfl: 2      Review of Systems   Constitutional: Positive for unexpected weight change (5 lbs recently).   HENT: Positive for congestion.    Respiratory: Positive for cough (improved since stopping benazepril), chest tightness (mild) and shortness of breath.  "Negative for wheezing.    Cardiovascular: Positive for leg swelling. Negative for chest pain and palpitations.   Genitourinary: Negative.  Negative for frequency.   Allergic/Immunologic: Positive for environmental allergies.   Hematological: Negative.          Objective   Vitals:    06/05/18 1052   BP: 126/84   BP Location: Left arm   Patient Position: Sitting   Cuff Size: Adult   Pulse: 85   SpO2: 96%   Weight: 92.9 kg (204 lb 13.6 oz)   Height: 180.3 cm (71\")     Physical Exam   Constitutional: He is oriented to person, place, and time. He appears well-developed and well-nourished. No distress.   Cardiovascular: Normal rate and regular rhythm.    Murmur heard.  Split S2.   Pulmonary/Chest: Effort normal. He has no decreased breath sounds. He has no wheezes. He has no rales. He exhibits no tenderness.   Abdominal: Soft. Bowel sounds are normal. He exhibits no distension.   Neurological: He is alert and oriented to person, place, and time.   Skin: Skin is warm and dry. No erythema.   1+ pitting ankle edema bilaterally.  No jugular venous distention.        Billy was seen today for shortness of breath and joint swelling.    Diagnoses and all orders for this visit:    AMOS (dyspnea on exertion)  -     Comprehensive Metabolic Panel  -     ECG 12 Lead  -     POC Urinalysis Dipstick, Automated  -     POC Microalbumin    Lower extremity edema  -     Comprehensive Metabolic Panel  -     ECG 12 Lead  -     POC Urinalysis Dipstick, Automated  -     POC Microalbumin    Essential hypertension  -     Comprehensive Metabolic Panel    Type 2 diabetes mellitus without complication, without long-term current use of insulin  -     POC Microalbumin    A normal sinus rhythm with an incomplete right bundle branch block.  There may be evidence of left atrial enlargement here, but overall, this is unchanged compared to ECG 4/20/18 and from December, 2017.  Chest x-ray done less than 2 months ago was clear; we will not repeat a chest x-ray " today.    Urinalysis shows evidence of protein.  Microalbumin creatinine ratio is 30 - 300.  This is certainly not nephrotic range proteinuria, so I doubt that is the cause of his edema.  I await the results of his CMP before making any further recommendations.    Dyspnea on exertion may be related to his underlying allergies have improved since starting the Flonase.  We may try adding Singulair to the mix.  He may also need pulmonary function test.

## 2018-06-13 ENCOUNTER — OFFICE VISIT (OUTPATIENT)
Dept: FAMILY MEDICINE CLINIC | Facility: CLINIC | Age: 63
End: 2018-06-13

## 2018-06-13 VITALS
HEIGHT: 71 IN | DIASTOLIC BLOOD PRESSURE: 76 MMHG | SYSTOLIC BLOOD PRESSURE: 116 MMHG | WEIGHT: 205 LBS | OXYGEN SATURATION: 98 % | HEART RATE: 84 BPM | BODY MASS INDEX: 28.7 KG/M2

## 2018-06-13 DIAGNOSIS — I10 ESSENTIAL HYPERTENSION: Primary | ICD-10-CM

## 2018-06-13 DIAGNOSIS — R60.0 LOWER EXTREMITY EDEMA: ICD-10-CM

## 2018-06-13 DIAGNOSIS — R06.09 DOE (DYSPNEA ON EXERTION): ICD-10-CM

## 2018-06-13 DIAGNOSIS — J30.9 ALLERGIC RHINITIS, UNSPECIFIED CHRONICITY, UNSPECIFIED SEASONALITY, UNSPECIFIED TRIGGER: ICD-10-CM

## 2018-06-13 PROCEDURE — 99213 OFFICE O/P EST LOW 20 MIN: CPT | Performed by: INTERNAL MEDICINE

## 2018-06-13 RX ORDER — HYDROCHLOROTHIAZIDE 25 MG/1
25 TABLET ORAL DAILY
Qty: 30 TABLET | Refills: 2 | Status: SHIPPED | OUTPATIENT
Start: 2018-06-13 | End: 2018-10-01 | Stop reason: ALTCHOICE

## 2018-06-13 RX ORDER — VALSARTAN 80 MG/1
80 TABLET ORAL DAILY
Qty: 30 TABLET | Refills: 2 | Status: SHIPPED | OUTPATIENT
Start: 2018-06-13 | End: 2018-08-15 | Stop reason: ALTCHOICE

## 2018-06-13 NOTE — PROGRESS NOTES
Subjective   Billy Bourne is a 62 y.o. male who presents today for:    Hypertension (review labs); Hyperlipidemia; and Edema (BLE swelling)    History of Present Illness   AMOS started insidiously last year and had a stress test that was negative.  It worsened in 3/2018.  He recovers fairly quickly with rest, but it recurs fairly consistently.  Since starting Flonase for allergies in mid-May, that has improved.  He feels his stamina continues to improve.  He coughs less, especially since stopping benazepril (see below).     He has also noticed some late-day swelling that started 1 week prior to his visit 6/5/18.  It has persisted since then.  His weight has gone up about 5 or 6 pounds with this edema.     Chronic HTN and T2 DM have been treated with an ACE-I in the past.  We stopped it to see if his cough would improve, and it did, slightly.  He developed worsening allergies and has improved with the addition of Flonase.    Mr. Bourne  reports that he has never smoked. He has never used smokeless tobacco. He reports that he drinks alcohol. He reports that he does not use drugs.     No Known Allergies    Current Outpatient Prescriptions:   •  atorvastatin (LIPITOR) 20 MG tablet, Take 20 mg by mouth Daily., Disp: , Rfl:   •  fluticasone (FLONASE) 50 MCG/ACT nasal spray, 1 spray into each nostril 2 (Two) Times a Day for 30 days. Administer 2 sprays in each nostril for each dose., Disp: 9.9 mL, Rfl: 2  •  glimepiride (AMARYL) 2 MG tablet, Take 2 mg by mouth Every Morning Before Breakfast., Disp: , Rfl:   •  loratadine (CLARITIN) 10 MG tablet, Take 10 mg by mouth Daily., Disp: , Rfl:   •  sildenafil (REVATIO) 20 MG tablet, 2-4 TABLET BY MOUTH AS NEEDED AS DIRECTED, Disp: , Rfl: 5  •  SYNJARDY XR 12.5-1000 MG tablet sustained-release 24 hour, Take 1 tablet by mouth Daily., Disp: , Rfl:   •  valsartan (DIOVAN) 160 MG tablet, Take 1 tablet by mouth Daily., Disp: 30 tablet, Rfl: 2      Review of Systems   Constitutional:  "Positive for unexpected weight change (mild gain).   Respiratory: Positive for shortness of breath.    Cardiovascular: Positive for leg swelling. Negative for chest pain and palpitations.   Neurological: Negative for syncope and light-headedness.         Objective   Vitals:    06/13/18 1145   BP: 116/76   BP Location: Left arm   Patient Position: Sitting   Cuff Size: Large Adult   Pulse: 84   SpO2: 98%   Weight: 93 kg (205 lb)   Height: 180.3 cm (71\")     Physical Exam    In NAD.  No periorbital edema.  No JVD or HJR.  RRR; no rub.  Spilt S2.  No ascites. No abd tenderness.    2+ pitting LE edema bilaterally.          Billy was seen today for hypertension and edema.    Diagnoses and all orders for this visit:    Essential hypertension  -     valsartan (DIOVAN) 80 MG tablet; Take 1 tablet by mouth Daily.  -     hydrochlorothiazide (HYDRODIURIL) 25 MG tablet; Take 1 tablet by mouth Daily.    Lower extremity edema    AMOS (dyspnea on exertion)    Allergic rhinitis, unspecified chronicity, unspecified seasonality, unspecified trigger    We will add HCTZ 25 mg to try to pull fluid off and help with the edema.  We will decrease the valsartan to 80 mg.  He will call if this combination causes any problems.  We may need to decrease the HCTZ to 12.5 mg once his weight gets below 200 pounds again.    Dyspnea on exertion and allergies have improved with use of Flonase.  We can always use Singulair, as we discussed again this time.    Labs done in anticipation of today's office visit were reviewed with the patient today.  There was no change in his renal function or electrolytes with the addition of valsartan.  We will recheck electrolytes before his follow-up visit in 12 weeks.  "

## 2018-08-15 RX ORDER — OLMESARTAN MEDOXOMIL 5 MG/1
5 TABLET ORAL DAILY
Qty: 30 TABLET | Refills: 0 | Status: SHIPPED | OUTPATIENT
Start: 2018-08-15 | End: 2018-09-11 | Stop reason: ALTCHOICE

## 2018-08-22 DIAGNOSIS — I10 ESSENTIAL HYPERTENSION: Primary | ICD-10-CM

## 2018-08-26 ENCOUNTER — RESULTS ENCOUNTER (OUTPATIENT)
Dept: FAMILY MEDICINE CLINIC | Facility: CLINIC | Age: 63
End: 2018-08-26

## 2018-08-26 DIAGNOSIS — I10 ESSENTIAL HYPERTENSION: ICD-10-CM

## 2018-08-29 LAB
ALBUMIN SERPL-MCNC: 4.9 G/DL (ref 3.5–5.2)
ALBUMIN/GLOB SERPL: 2.9 G/DL
ALP SERPL-CCNC: 73 U/L (ref 39–117)
ALT SERPL-CCNC: 26 U/L (ref 1–41)
AST SERPL-CCNC: 15 U/L (ref 1–40)
BILIRUB SERPL-MCNC: 0.7 MG/DL (ref 0.1–1.2)
BUN SERPL-MCNC: 20 MG/DL (ref 8–23)
BUN/CREAT SERPL: 18 (ref 7–25)
CALCIUM SERPL-MCNC: 9.5 MG/DL (ref 8.6–10.5)
CHLORIDE SERPL-SCNC: 101 MMOL/L (ref 98–107)
CO2 SERPL-SCNC: 27 MMOL/L (ref 22–29)
CREAT SERPL-MCNC: 1.11 MG/DL (ref 0.76–1.27)
GLOBULIN SER CALC-MCNC: 1.7 GM/DL
GLUCOSE SERPL-MCNC: 159 MG/DL (ref 65–99)
POTASSIUM SERPL-SCNC: 4 MMOL/L (ref 3.5–5.2)
PROT SERPL-MCNC: 6.6 G/DL (ref 6–8.5)
SODIUM SERPL-SCNC: 139 MMOL/L (ref 136–145)

## 2018-09-05 ENCOUNTER — OFFICE VISIT (OUTPATIENT)
Dept: FAMILY MEDICINE CLINIC | Facility: CLINIC | Age: 63
End: 2018-09-05

## 2018-09-05 VITALS
BODY MASS INDEX: 28.56 KG/M2 | HEIGHT: 71 IN | WEIGHT: 204 LBS | HEART RATE: 72 BPM | SYSTOLIC BLOOD PRESSURE: 124 MMHG | OXYGEN SATURATION: 99 % | DIASTOLIC BLOOD PRESSURE: 80 MMHG

## 2018-09-05 DIAGNOSIS — I10 ESSENTIAL HYPERTENSION: Primary | ICD-10-CM

## 2018-09-05 DIAGNOSIS — R60.0 LOWER EXTREMITY EDEMA: ICD-10-CM

## 2018-09-05 DIAGNOSIS — R06.09 DOE (DYSPNEA ON EXERTION): ICD-10-CM

## 2018-09-05 PROCEDURE — 99214 OFFICE O/P EST MOD 30 MIN: CPT | Performed by: INTERNAL MEDICINE

## 2018-09-05 RX ORDER — BLOOD SUGAR DIAGNOSTIC
STRIP MISCELLANEOUS
COMMUNITY
Start: 2018-07-05 | End: 2018-10-09

## 2018-09-05 NOTE — PROGRESS NOTES
Subjective   Billy Bourne is a 62 y.o. male who presents today for:    Hypertension (Follow Up Labs) and Shortness of Breath    History of Present Illness   HTN: Chronic, benign, essential HTN that was treated with benazepril 20 bid when we first saw him in 4/2018.  We stopped the ACE-I then and started valsartan with good BP control.  AMOS persisted, improved a bit with the treatment of his allergies, but he still complains of marked AMOS at levels of exertion that did not formerly plague him.  He recovers with 1-2 minutes of rest.    Cough is better off the ACE-I.  AMOS is no better since adding HCTZ 25 mg in 6/2018, and LE edema has not improved.      Mr. Bourne  reports that he has never smoked. He has never used smokeless tobacco. He reports that he drinks alcohol. He reports that he does not use drugs.     No Known Allergies    Current Outpatient Prescriptions:   •  atorvastatin (LIPITOR) 20 MG tablet, Take 20 mg by mouth Daily., Disp: , Rfl:   •  hydrochlorothiazide (HYDRODIURIL) 25 MG tablet, Take 1 tablet by mouth Daily., Disp: 30 tablet, Rfl: 2  •  loratadine (CLARITIN) 10 MG tablet, Take 10 mg by mouth Daily., Disp: , Rfl:   •  olmesartan (BENICAR) 5 MG tablet, Take 1 tablet by mouth Daily., Disp: 30 tablet, Rfl: 0  •  sildenafil (REVATIO) 20 MG tablet, 2-4 TABLET BY MOUTH AS NEEDED AS DIRECTED, Disp: , Rfl: 5  •  SYNJARDY XR 12.5-1000 MG tablet sustained-release 24 hour, Take 1 tablet by mouth Daily., Disp: , Rfl:   •  glimepiride (AMARYL) 2 MG tablet, Take 2 mg by mouth Every Morning Before Breakfast., Disp: , Rfl:   •  ONETOUCH VERIO test strip, , Disp: , Rfl:       Review of Systems   Constitutional: Positive for unexpected weight change.   Eyes: Negative for visual disturbance.   Respiratory: Positive for shortness of breath. Negative for apnea, cough, chest tightness and wheezing.    Cardiovascular: Positive for leg swelling. Negative for chest pain and palpitations.   Gastrointestinal: Negative for  "abdominal distention and abdominal pain.   Musculoskeletal:        Calf cramping at night   Neurological: Negative for dizziness, speech difficulty, weakness, light-headedness and numbness.   He denies orthopnea and PND.      Objective   Vitals:    09/05/18 1113   BP: 124/80   Pulse: 72   SpO2: 99%   Weight: 92.5 kg (204 lb)   Height: 180.3 cm (71\")     Physical Exam    Obese, in NAD.  Fullness in the RUQ.  Mild HJR.  1+ edema bilaterally.  RRR; prominent S2. No rub.      Billy was seen today for hypertension and shortness of breath.    Diagnoses and all orders for this visit:    Essential hypertension    Lower extremity edema  -     TSH Rfx On Abnormal To Free T4    AMOS (dyspnea on exertion)  -     proBNP    Check the labs as ordered above.  If the thyroid function test is abnormal, we will address it.  If the BNP is elevated, we will try adding Lasix to see if that helps with all of his symptoms across the board.  "

## 2018-09-06 LAB
NT-PROBNP SERPL-MCNC: 1667 PG/ML (ref 0–210)
TSH SERPL DL<=0.005 MIU/L-ACNC: 3.41 MIU/ML (ref 0.27–4.2)

## 2018-09-10 DIAGNOSIS — I10 ESSENTIAL HYPERTENSION: ICD-10-CM

## 2018-09-11 ENCOUNTER — TELEPHONE (OUTPATIENT)
Dept: FAMILY MEDICINE CLINIC | Facility: CLINIC | Age: 63
End: 2018-09-11

## 2018-09-11 DIAGNOSIS — I50.33 ACUTE ON CHRONIC DIASTOLIC HEART FAILURE (HCC): Primary | ICD-10-CM

## 2018-09-11 PROBLEM — I50.32 CHRONIC DIASTOLIC HEART FAILURE: Status: ACTIVE | Noted: 2018-09-11

## 2018-09-11 RX ORDER — HYDROCHLOROTHIAZIDE 25 MG/1
TABLET ORAL
Qty: 30 TABLET | Refills: 2 | OUTPATIENT
Start: 2018-09-11

## 2018-09-11 RX ORDER — VALSARTAN 80 MG/1
TABLET ORAL
Qty: 30 TABLET | Refills: 2 | Status: SHIPPED | OUTPATIENT
Start: 2018-09-11 | End: 2018-10-17 | Stop reason: HOSPADM

## 2018-09-11 RX ORDER — FUROSEMIDE 40 MG/1
40 TABLET ORAL DAILY
Qty: 30 TABLET | Refills: 0 | Status: SHIPPED | OUTPATIENT
Start: 2018-09-11 | End: 2018-10-08 | Stop reason: SDUPTHER

## 2018-09-11 NOTE — TELEPHONE ENCOUNTER
Phone note:  Edema has begun to subside somewhat, and breathing has begun to improve a bit.  This is with hydrochlorothiazide 25 mg once daily.    ProBNP is markedly elevated.  Therefore, we will stop the hydrochlorothiazide and start furosemide.    We will pursue a repeat echocardiogram now instead of waiting until after he sees his cardiologist in December.  We will also recheck labs in approximately 3 weeks and see the patient in 4 weeks.    NAOMI

## 2018-09-19 ENCOUNTER — HOSPITAL ENCOUNTER (OUTPATIENT)
Dept: CARDIOLOGY | Facility: HOSPITAL | Age: 63
Discharge: HOME | End: 2018-09-19
Attending: INTERNAL MEDICINE | Admitting: INTERNAL MEDICINE

## 2018-09-19 VITALS
WEIGHT: 204 LBS | SYSTOLIC BLOOD PRESSURE: 120 MMHG | DIASTOLIC BLOOD PRESSURE: 62 MMHG | OXYGEN SATURATION: 98 % | BODY MASS INDEX: 28.56 KG/M2 | HEIGHT: 71 IN | HEART RATE: 71 BPM

## 2018-09-19 LAB
BH CV ECHO MEAS - ACS: 1.9 CM
BH CV ECHO MEAS - AO MAX PG (FULL): 4 MMHG
BH CV ECHO MEAS - AO MAX PG: 7.8 MMHG
BH CV ECHO MEAS - AO MEAN PG (FULL): 2.2 MMHG
BH CV ECHO MEAS - AO MEAN PG: 4.2 MMHG
BH CV ECHO MEAS - AO ROOT AREA: 6.7 CM^2
BH CV ECHO MEAS - AO ROOT DIAM: 2.9 CM
BH CV ECHO MEAS - AO V2 MAX: 140 CM/SEC
BH CV ECHO MEAS - AO V2 MEAN: 96.1 CM/SEC
BH CV ECHO MEAS - AO V2 VTI: 23 CM
BH CV ECHO MEAS - AVA(I,A): 1.6 CM^2
BH CV ECHO MEAS - AVA(I,D): 1.6 CM^2
BH CV ECHO MEAS - AVA(V,A): 1.5 CM^2
BH CV ECHO MEAS - AVA(V,D): 1.5 CM^2
BH CV ECHO MEAS - CONTRAST EF (2CH): 44 CM2
BH CV ECHO MEAS - CONTRAST EF 4CH: 42 CM2
BH CV ECHO MEAS - EDV(CUBED): 107 ML
BH CV ECHO MEAS - EDV(MOD-SP2): 86 ML
BH CV ECHO MEAS - EDV(MOD-SP4): 73 ML
BH CV ECHO MEAS - EDV(TEICH): 104.8 ML
BH CV ECHO MEAS - EF(CUBED): 67.8 %
BH CV ECHO MEAS - EF(MOD-BP): 42 %
BH CV ECHO MEAS - EF(TEICH): 59.3 %
BH CV ECHO MEAS - ESV(CUBED): 34.5 ML
BH CV ECHO MEAS - ESV(MOD-SP2): 48 ML
BH CV ECHO MEAS - ESV(MOD-SP4): 42 ML
BH CV ECHO MEAS - ESV(TEICH): 42.7 ML
BH CV ECHO MEAS - IVS/LVPW: 1.2
BH CV ECHO MEAS - IVSD: 1.7 CM
BH CV ECHO MEAS - LAT PEAK E' VEL: 4 CM/SEC
BH CV ECHO MEAS - LV MASS(C)D: 319.5 GRAMS
BH CV ECHO MEAS - LV MAX PG: 3.9 MMHG
BH CV ECHO MEAS - LV MEAN PG: 2.1 MMHG
BH CV ECHO MEAS - LV V1 MAX: 98.5 CM/SEC
BH CV ECHO MEAS - LV V1 MEAN: 66.3 CM/SEC
BH CV ECHO MEAS - LV V1 VTI: 18.2 CM
BH CV ECHO MEAS - LVIDD: 4.7 CM
BH CV ECHO MEAS - LVIDS: 3.3 CM
BH CV ECHO MEAS - LVLD AP2: 7.7 CM
BH CV ECHO MEAS - LVLD AP4: 7.8 CM
BH CV ECHO MEAS - LVLS AP2: 7.3 CM
BH CV ECHO MEAS - LVLS AP4: 7.3 CM
BH CV ECHO MEAS - LVOT AREA (M): 2 CM^2
BH CV ECHO MEAS - LVOT AREA: 2.1 CM^2
BH CV ECHO MEAS - LVOT DIAM: 1.6 CM
BH CV ECHO MEAS - LVPWD: 1.4 CM
BH CV ECHO MEAS - MED PEAK E' VEL: 4 CM/SEC
BH CV ECHO MEAS - MR MAX PG: 69.2 MMHG
BH CV ECHO MEAS - MR MAX VEL: 416 CM/SEC
BH CV ECHO MEAS - MV DEC SLOPE: 493.2 CM/SEC^2
BH CV ECHO MEAS - MV DEC TIME: 0.14 SEC
BH CV ECHO MEAS - MV E MAX VEL: 86.3 CM/SEC
BH CV ECHO MEAS - MV MAX PG: 6.4 MMHG
BH CV ECHO MEAS - MV MEAN PG: 1.5 MMHG
BH CV ECHO MEAS - MV P1/2T MAX VEL: 88.8 CM/SEC
BH CV ECHO MEAS - MV P1/2T: 52.7 MSEC
BH CV ECHO MEAS - MV V2 MAX: 127 CM/SEC
BH CV ECHO MEAS - MV V2 MEAN: 56.2 CM/SEC
BH CV ECHO MEAS - MV V2 VTI: 29.1 CM
BH CV ECHO MEAS - MVA P1/2T LCG: 2.5 CM^2
BH CV ECHO MEAS - MVA(P1/2T): 4.2 CM^2
BH CV ECHO MEAS - MVA(VTI): 1.3 CM^2
BH CV ECHO MEAS - PA MAX PG (FULL): 0.31 MMHG
BH CV ECHO MEAS - PA MAX PG: 1 MMHG
BH CV ECHO MEAS - PA V2 MAX: 50.9 CM/SEC
BH CV ECHO MEAS - PVA(V,A): 3 CM^2
BH CV ECHO MEAS - PVA(V,D): 3 CM^2
BH CV ECHO MEAS - QP/QS: 0.91
BH CV ECHO MEAS - RAP SYSTOLE: 15 MMHG
BH CV ECHO MEAS - RV MAX PG: 0.73 MMHG
BH CV ECHO MEAS - RV MEAN PG: 0.45 MMHG
BH CV ECHO MEAS - RV V1 MAX: 42.7 CM/SEC
BH CV ECHO MEAS - RV V1 MEAN: 31.6 CM/SEC
BH CV ECHO MEAS - RV V1 VTI: 9.6 CM
BH CV ECHO MEAS - RVOT AREA: 3.6 CM^2
BH CV ECHO MEAS - RVOT DIAM: 2.1 CM
BH CV ECHO MEAS - RVSP: 67 MMHG
BH CV ECHO MEAS - SUP REN AO DIAM: 2.6 CM
BH CV ECHO MEAS - SV(AO): 155.1 ML
BH CV ECHO MEAS - SV(CUBED): 72.5 ML
BH CV ECHO MEAS - SV(LVOT): 37.9 ML
BH CV ECHO MEAS - SV(MOD-SP2): 38 ML
BH CV ECHO MEAS - SV(MOD-SP4): 31 ML
BH CV ECHO MEAS - SV(RVOT): 34.4 ML
BH CV ECHO MEAS - SV(TEICH): 62.1 ML
BH CV ECHO MEAS - TAPSE (>1.6): 1.7 CM2
BH CV ECHO MEAS - TR MAX VEL: 357.5 CM/SEC
BH CV ECHO MEASUREMENTS AVERAGE E/E' RATIO: 21.58
BH CV XLRA - RV BASE: 3.2 CM
BH CV XLRA - TDI S': 10 CM/SEC
LEFT ATRIUM VOLUME INDEX: 34 ML/M2
LV EF 2D ECHO EST: 42 %
MAXIMAL PREDICTED HEART RATE: 157 BPM
SINUS: 2.6 CM
STJ: 2.7 CM
STRESS TARGET HR: 133 BPM

## 2018-09-19 PROCEDURE — 0399T ADULT TRANSTHORACIC ECHO COMPLETE W/ CONT IF NECESSARY PER PROTOCOL: CPT | Performed by: INTERNAL MEDICINE

## 2018-09-19 PROCEDURE — 93306 TTE W/DOPPLER COMPLETE: CPT | Performed by: INTERNAL MEDICINE

## 2018-09-19 PROCEDURE — 25010000002 PERFLUTREN (DEFINITY) 8.476 MG IN SODIUM CHLORIDE 0.9 % 10 ML INJECTION: Performed by: INTERNAL MEDICINE

## 2018-09-19 PROCEDURE — 0399T HC MYOCARDL STRAIN IMAG QUAN ASSMT PER SESS: CPT

## 2018-09-19 PROCEDURE — 93306 TTE W/DOPPLER COMPLETE: CPT

## 2018-09-19 RX ADMIN — PERFLUTREN 1.5 ML: 6.52 INJECTION, SUSPENSION INTRAVENOUS at 08:00

## 2018-09-26 ENCOUNTER — TELEPHONE (OUTPATIENT)
Dept: CARDIOLOGY | Facility: CLINIC | Age: 63
End: 2018-09-26

## 2018-09-27 ENCOUNTER — TELEPHONE (OUTPATIENT)
Dept: CARDIOLOGY | Facility: CLINIC | Age: 63
End: 2018-09-27

## 2018-09-27 NOTE — TELEPHONE ENCOUNTER
Pt called back and he will take that appt .  When you have a chance can you put the pt on for a follow up with CS on 10/1/18 at 2:20p for Abn Echo.    Thanks,  Tamiko

## 2018-09-27 NOTE — TELEPHONE ENCOUNTER
----- Message from Wm Deleon MD sent at 9/26/2018 11:36 AM EDT -----  It in to see us in the next few weeks.  He has a new cardiomyopathy and shortness of breath.

## 2018-10-01 ENCOUNTER — OFFICE VISIT (OUTPATIENT)
Dept: CARDIOLOGY | Facility: CLINIC | Age: 63
End: 2018-10-01

## 2018-10-01 ENCOUNTER — RESULTS ENCOUNTER (OUTPATIENT)
Dept: FAMILY MEDICINE CLINIC | Facility: CLINIC | Age: 63
End: 2018-10-01

## 2018-10-01 VITALS
SYSTOLIC BLOOD PRESSURE: 134 MMHG | HEART RATE: 79 BPM | DIASTOLIC BLOOD PRESSURE: 86 MMHG | HEIGHT: 71 IN | WEIGHT: 195 LBS | BODY MASS INDEX: 27.3 KG/M2

## 2018-10-01 DIAGNOSIS — E78.00 PURE HYPERCHOLESTEROLEMIA: ICD-10-CM

## 2018-10-01 DIAGNOSIS — I50.33 ACUTE ON CHRONIC DIASTOLIC HEART FAILURE (HCC): ICD-10-CM

## 2018-10-01 DIAGNOSIS — I51.7 LVH (LEFT VENTRICULAR HYPERTROPHY): Primary | ICD-10-CM

## 2018-10-01 DIAGNOSIS — I27.20 PULMONARY HYPERTENSION (HCC): ICD-10-CM

## 2018-10-01 DIAGNOSIS — I10 ESSENTIAL HYPERTENSION: ICD-10-CM

## 2018-10-01 DIAGNOSIS — I50.41 ACUTE COMBINED SYSTOLIC AND DIASTOLIC CONGESTIVE HEART FAILURE (HCC): ICD-10-CM

## 2018-10-01 PROCEDURE — 93000 ELECTROCARDIOGRAM COMPLETE: CPT | Performed by: INTERNAL MEDICINE

## 2018-10-01 PROCEDURE — 99214 OFFICE O/P EST MOD 30 MIN: CPT | Performed by: INTERNAL MEDICINE

## 2018-10-01 RX ORDER — CARVEDILOL 3.12 MG/1
3.12 TABLET ORAL 2 TIMES DAILY
Qty: 60 TABLET | Refills: 11 | Status: SHIPPED | OUTPATIENT
Start: 2018-10-01 | End: 2019-04-06 | Stop reason: ALTCHOICE

## 2018-10-01 NOTE — PROGRESS NOTES
Billy Bourne  1955  Date of Office Visit: 10/1/18  Encounter Provider: Wm Deleon MD  Place of Service: [facility] CARDIOLOGY      CHIEF COMPLAINT:  Syncope  Bilateral lower extremity edema  Cardiomyopathy    HISTORY OF PRESENT ILLNESS:Dr. Van  I had the pleasure of seeing your patient Billy Bourne in follow-up today.  Very pleasant 62-year-old gentleman with a medical history of diabetes mellitus, hypertension, and hyperlipidemia who I initially saw secondary to syncope. At that time, the patient did undergo a transthoracic echocardiogram and Zio Patch.  The transthoracic echocardiogram at that time showed moderate left ventricular hypertrophy and pseudo normalization of diastolic function; however, the ejection fraction was preserved.  The patient has been following with you and has been complaining of bilateral lower extremity edema that has been moderate in intensity and only mildly improved with elevation.  In addition, he has noticed dyspnea on exertion that also is at least moderate if not severe.  It comes on when he walks up stairs or walks on flat ground at a brisk pace.  It recovers within one to minutes of rest.  He had no issues with chest pain.  He had laboratory work ordered including a proBNP at 1,667.  He was subsequently placed on ACE inhibitor and a transthoracic echocardiogram repeat was ordered.  This showed a normal left ventricular cavity size but moderate left ventricular hypertrophy once again.  The ejection fraction was now decreased at about 40%.  He had mild to moderate mitral and tricuspid valve regurgitation.  The right ventricular systolic pressure suggested moderate to severe pulmonary hypertension with a pressure measurement of 67 mmHg.    He states, since starting his diuretic therapy, that he has had a mild improvement in his dyspnea and swelling; however, it is still present.      Review of Systems   Constitution: Negative for  fever, weakness and malaise/fatigue.   HENT: Negative for nosebleeds and sore throat.    Eyes: Negative for blurred vision and double vision.   Cardiovascular: Positive for dyspnea on exertion and leg swelling. Negative for chest pain, claudication, palpitations and syncope.   Respiratory: Negative for cough, shortness of breath and snoring.    Endocrine: Negative for cold intolerance, heat intolerance and polydipsia.   Skin: Negative for itching, poor wound healing and rash.   Musculoskeletal: Negative for joint pain, joint swelling, muscle weakness and myalgias.   Gastrointestinal: Negative for abdominal pain, melena, nausea and vomiting.   Neurological: Negative for light-headedness, loss of balance, seizures and vertigo.   Psychiatric/Behavioral: Negative for altered mental status and depression.       Past Medical History:   Diagnosis Date   • Chronic diastolic heart failure (CMS/HCC) 9/11/2018   • Colon polyps    • Diabetes mellitus (CMS/HCC)    • Hyperlipidemia    • Hypertension    • Seizures (CMS/HCC)        The following portions of the patient's history were reviewed and updated as appropriate: Social history , Family history and Surgical history     Current Outpatient Prescriptions on File Prior to Visit   Medication Sig Dispense Refill   • atorvastatin (LIPITOR) 20 MG tablet Take 20 mg by mouth Daily.     • furosemide (LASIX) 40 MG tablet Take 1 tablet by mouth Daily. 30 tablet 0   • glimepiride (AMARYL) 2 MG tablet Take 2 mg by mouth Every Morning Before Breakfast.     • loratadine (CLARITIN) 10 MG tablet Take 10 mg by mouth Daily.     • ONETOUCH VERIO test strip      • sildenafil (REVATIO) 20 MG tablet 2-4 TABLET BY MOUTH AS NEEDED AS DIRECTED  5   • SYNJARDY XR 12.5-1000 MG tablet sustained-release 24 hour Take 1 tablet by mouth Daily.     • valsartan (DIOVAN) 80 MG tablet TAKE 1 TABLET BY MOUTH EVERY DAY 30 tablet 2   • [DISCONTINUED] hydrochlorothiazide (HYDRODIURIL) 25 MG tablet Take 1 tablet by  "mouth Daily. 30 tablet 2     No current facility-administered medications on file prior to visit.        No Known Allergies    Vitals:    10/01/18 1404   BP: 134/86   Pulse: 79   Weight: 88.5 kg (195 lb)   Height: 180.3 cm (71\")     Physical Exam   Constitutional: He is oriented to person, place, and time. He appears well-developed and well-nourished.   HENT:   Head: Normocephalic and atraumatic.   Eyes: Conjunctivae and EOM are normal. No scleral icterus.   Neck: Normal range of motion. Neck supple. Normal carotid pulses, no hepatojugular reflux and no JVD present. Carotid bruit is not present. No tracheal deviation present. No thyromegaly present.   Cardiovascular: Normal rate and regular rhythm.  Exam reveals no gallop and no friction rub.    No murmur heard.  Pulses:       Carotid pulses are 2+ on the right side, and 2+ on the left side.       Radial pulses are 2+ on the right side, and 2+ on the left side.        Femoral pulses are 2+ on the right side, and 2+ on the left side.       Dorsalis pedis pulses are 2+ on the right side, and 2+ on the left side.        Posterior tibial pulses are 2+ on the right side, and 2+ on the left side.   Pulmonary/Chest: Breath sounds normal. No respiratory distress. He has no decreased breath sounds. He has no wheezes. He has no rhonchi. He has no rales. He exhibits no tenderness.   Abdominal: Soft. Bowel sounds are normal. He exhibits no distension. There is no tenderness. There is no rebound.   Musculoskeletal: He exhibits no edema or deformity.   Neurological: He is alert and oriented to person, place, and time. He has normal strength. No sensory deficit.   Skin: No rash noted. No erythema.   Psychiatric: He has a normal mood and affect. His behavior is normal.     No components found for: CBC  No results found for: CMP  No components found for: LIPID  No results found for: BMP      ECG 12 Lead  Date/Time: 10/1/2018 3:33 PM  Performed by: RODRIGO FERRIS " by: RODRIGO FERRIS   Comparison: compared with previous ECG from 12/1/2017  Similar to previous ECG  Rhythm: sinus rhythm  Rate: normal  Conduction: incomplete RBBB  QRS axis: right  Other findings: LAE and PRWP  Clinical impression: abnormal ECG             9/19/18  · Calculated EF = 42%. Estimated EF was in agreement with the calculated EF. Estimated EF = 42%. Global Longitudinal LV strain = -10.7%. Strain data was reviewed and speckle tracking was considered accurate. The global longitudinal strain is markedly abnormal.  · Normal left ventricular cavity size noted. There is left ventricular global hypokinesis noted. Left ventricular wall thickness is consistent with moderate concentric hypertrophy. The findings are consistent with infiltrative cardiomyopathy. There is an echo bright appearance to the left and right ventricular myocardium suspicious for amyloid heart disease. . Left ventricular diastolic function was indeterminate. Elevated left atrial pressure.  · Normal right ventricular cavity size noted. Right ventricular wall thickness is consistent with moderate hypertrophy. Borderline low right ventricular systolic function noted.  · Left atrial volume is mildly increased.  · Right atrial cavity size is mildly dilated  · The valve was poorly visualized but appears trileaflet. The aortic valve is abnormal in structure. There is mild thickening of the aortic valve.  · Mild-to-moderate mitral valve regurgitation is present  · Mild-to-moderate mitral valve regurgitation is present.  · Mild to moderate tricuspid valve regurgitation is present. Estimated right ventricular systolic pressure from tricuspid regurgitation is markedly elevated (>55 mmHg). Calculated right ventricular systolic pressure from tricuspid regurgitation is 67 mmHg.  · There is a trivial pericardial effusion.         I had the pleasure of seeing the patient today.  He is 63 years old and presents to me for evaluation of acute systolic  and diastolic heart failure, moderate left ventricular hypertrophy, at least moderate pulmonary hypertension, and essential hypertension.  He also has mild proteinuria and diabetes mellitus.      His decreased ejection fraction is new along with his volume overload.  Today, he does appear to be euvolemic but continues to complain of dyspnea on exertion.    1. Acute combined systolic and diastolic heart failure.  - I am unclear of the etiology but I do think that certain things need to be ruled out.  I would recommend a right and left heart catheterization to rule out coronary artery disease as an etiology for his decreased ejection fraction.  - This will also allow me to assess his filling pressures and his pulmonary pressures after his mild diuresis.  - I would recommend continuing the valsartan and adding low-dose carvedilol therapy to address his heart failure.  - In addition, with the appearance of his myocardium, I do think getting a SPEP and UPEP is reasonable.  He also does have mild proteinuria and certainly does have an echocardiogram bright appearance of the myocardium.  - It is possible that he did have a viral etiology, as he did have a viral illness while he was visiting his kids in [place], but I think that this should be more of a diagnosis of exclusion in his case.  2. Essential hypertension reasonably controlled.  He will have additional improvement with the small dose of carvedilol that we will be starting.  3. Pulmonary hypertension likely elevated from where it should be, as his echocardiogram was done in the setting of volume overload.  - I will measure his pulmonary pressures on right heart catheterization.    We will touch base with him after his workup is ongoing.

## 2018-10-02 ENCOUNTER — LAB (OUTPATIENT)
Dept: FAMILY MEDICINE CLINIC | Facility: CLINIC | Age: 63
End: 2018-10-02

## 2018-10-02 DIAGNOSIS — I51.7 LVH (LEFT VENTRICULAR HYPERTROPHY): ICD-10-CM

## 2018-10-02 PROBLEM — I27.20 PULMONARY HYPERTENSION: Status: ACTIVE | Noted: 2018-10-02

## 2018-10-02 PROBLEM — I10 ESSENTIAL HYPERTENSION: Status: ACTIVE | Noted: 2018-10-02

## 2018-10-02 PROBLEM — E78.00 PURE HYPERCHOLESTEROLEMIA: Status: ACTIVE | Noted: 2018-10-02

## 2018-10-02 PROBLEM — I50.41 ACUTE COMBINED SYSTOLIC AND DIASTOLIC CONGESTIVE HEART FAILURE: Status: ACTIVE | Noted: 2018-10-02

## 2018-10-04 LAB
ALBUMIN 24H MFR UR ELPH: 26.9 %
ALBUMIN SERPL ELPH-MCNC: 3.7 G/DL (ref 2.9–4.4)
ALBUMIN/GLOB SERPL: 1.5 {RATIO} (ref 0.7–1.7)
ALPHA1 GLOB 24H MFR UR ELPH: 0.7 %
ALPHA1 GLOB SERPL ELPH-MCNC: 0.3 G/DL (ref 0–0.4)
ALPHA2 GLOB 24H MFR UR ELPH: 4.8 %
ALPHA2 GLOB SERPL ELPH-MCNC: 0.7 G/DL (ref 0.4–1)
B-GLOBULIN MFR UR ELPH: 63.5 %
B-GLOBULIN SERPL ELPH-MCNC: 0.9 G/DL (ref 0.7–1.3)
BUN SERPL-MCNC: 21 MG/DL (ref 8–23)
BUN/CREAT SERPL: 17.4 (ref 7–25)
CALCIUM SERPL-MCNC: 9 MG/DL (ref 8.6–10.5)
CHLORIDE SERPL-SCNC: 102 MMOL/L (ref 98–107)
CO2 SERPL-SCNC: 27.2 MMOL/L (ref 22–29)
CREAT SERPL-MCNC: 1.21 MG/DL (ref 0.76–1.27)
ERYTHROCYTE [DISTWIDTH] IN BLOOD BY AUTOMATED COUNT: 15.7 % (ref 11.5–14.5)
GAMMA GLOB 24H MFR UR ELPH: 4.1 %
GAMMA GLOB SERPL ELPH-MCNC: 0.6 G/DL (ref 0.4–1.8)
GLOBULIN SER-MCNC: 2.5 G/DL (ref 2.2–3.9)
GLUCOSE SERPL-MCNC: 106 MG/DL (ref 65–99)
HCT VFR BLD AUTO: 44.6 % (ref 40.4–52.2)
HGB BLD-MCNC: 13.8 G/DL (ref 13.7–17.6)
HIV 1 & 2 AB SER-IMP: ABNORMAL
IGA SERPL-MCNC: 33 MG/DL (ref 61–437)
IGG SERPL-MCNC: 569 MG/DL (ref 700–1600)
IGM SERPL-MCNC: 14 MG/DL (ref 20–172)
INTERPRETATION SERPL IEP-IMP: ABNORMAL
INTERPRETATION UR IFE-IMP: ABNORMAL
LABORATORY COMMENT REPORT: ABNORMAL
M PROTEIN 24H MFR UR ELPH: 56.5 %
M PROTEIN SERPL ELPH-MCNC: ABNORMAL G/DL
MCH RBC QN AUTO: 27.7 PG (ref 27–32.7)
MCHC RBC AUTO-ENTMCNC: 30.9 G/DL (ref 32.6–36.4)
MCV RBC AUTO: 89.4 FL (ref 79.8–96.2)
NT-PROBNP SERPL-MCNC: 2462 PG/ML (ref 0–210)
PLATELET # BLD AUTO: 152 10*3/MM3 (ref 140–500)
POTASSIUM SERPL-SCNC: 4.6 MMOL/L (ref 3.5–5.2)
PROT SERPL-MCNC: 6.2 G/DL (ref 6–8.5)
PROT UR-MCNC: 187 MG/DL
RBC # BLD AUTO: 4.99 10*6/MM3 (ref 4.6–6)
SODIUM SERPL-SCNC: 142 MMOL/L (ref 136–145)
WBC # BLD AUTO: 7.01 10*3/MM3 (ref 4.5–10.7)

## 2018-10-07 DIAGNOSIS — I10 ESSENTIAL HYPERTENSION: ICD-10-CM

## 2018-10-08 RX ORDER — FUROSEMIDE 40 MG/1
TABLET ORAL
Qty: 30 TABLET | Refills: 5 | Status: ON HOLD | OUTPATIENT
Start: 2018-10-08 | End: 2018-10-10

## 2018-10-08 RX ORDER — HYDROCHLOROTHIAZIDE 25 MG/1
TABLET ORAL
Qty: 30 TABLET | Refills: 2 | OUTPATIENT
Start: 2018-10-08

## 2018-10-09 ENCOUNTER — OFFICE VISIT (OUTPATIENT)
Dept: FAMILY MEDICINE CLINIC | Facility: CLINIC | Age: 63
End: 2018-10-09

## 2018-10-09 VITALS
HEIGHT: 71 IN | WEIGHT: 203.4 LBS | HEART RATE: 82 BPM | DIASTOLIC BLOOD PRESSURE: 84 MMHG | OXYGEN SATURATION: 97 % | SYSTOLIC BLOOD PRESSURE: 130 MMHG | BODY MASS INDEX: 28.48 KG/M2

## 2018-10-09 DIAGNOSIS — D47.2 MONOCLONAL GAMMOPATHY: ICD-10-CM

## 2018-10-09 DIAGNOSIS — I51.7 LVH (LEFT VENTRICULAR HYPERTROPHY): ICD-10-CM

## 2018-10-09 DIAGNOSIS — I50.41 ACUTE COMBINED SYSTOLIC AND DIASTOLIC CONGESTIVE HEART FAILURE (HCC): ICD-10-CM

## 2018-10-09 DIAGNOSIS — I10 ESSENTIAL HYPERTENSION: Primary | ICD-10-CM

## 2018-10-09 DIAGNOSIS — E11.9 TYPE 2 DIABETES MELLITUS WITHOUT COMPLICATION, WITHOUT LONG-TERM CURRENT USE OF INSULIN (HCC): ICD-10-CM

## 2018-10-09 PROCEDURE — 99215 OFFICE O/P EST HI 40 MIN: CPT | Performed by: INTERNAL MEDICINE

## 2018-10-09 NOTE — PATIENT INSTRUCTIONS
Hold the following medicines for the heart catheter:  Glimepiride 2 mg.  Synjardy XR 12.5/1000.  Valsartan 80 mg.  Furosemide 40 mg.

## 2018-10-09 NOTE — PERIOPERATIVE NURSING NOTE
Called and spoke with patient confirming arrival time and need to be NPO after MN. May take am meds with the exception of diabetic/diuretic meds. Wife to accompany pt to hospital in the am.

## 2018-10-09 NOTE — PROGRESS NOTES
Subjective   Billy Bourne is a 63 y.o. male who presents today for:    Hypertension (1 month f/u & review labs) and Shortness of Breath    History of Present Illness     This gentleman presented to me with the new onset of lower extremity edema and shortness of breath.  He had a cardiac evaluation back in December for syncope, and it was unremarkable.  However, evaluation this time showed an elevated BNP.  We diuresed him and sent him for an echocardiogram that was glaringly abnormal.  He has since seen his cardiologist in follow-up and is scheduled for left and right heart cath tomorrow.    After diuresing nicely with the addition of Lasix, the patient has recently started having the recurrence of edema and his weight has trended up.  Shortness of breath is worsening as well.    As part of his workup, additional labs have been drawn which included a serum protein electrophoresis that was abnormal.  The patient presents with his wife, Kelle, and his son who is a nurse anesthetist for discussion of these results.    In addition to these symptoms, he has developed upper respiratory symptoms with head congestion and runny nose.  This has improved, but cough that subsequently developed has persisted.  He reports a cough that is productive of discolored (light green) sputum.  It has started to improve after its onset approximately 10 days ago.        Mr. Bourne  reports that he has never smoked. He has never used smokeless tobacco. He reports that he drinks alcohol. He reports that he does not use drugs.     No Known Allergies    Current Outpatient Prescriptions:   •  atorvastatin (LIPITOR) 20 MG tablet, Take 20 mg by mouth Daily., Disp: , Rfl:   •  carvedilol (COREG) 3.125 MG tablet, Take 1 tablet by mouth 2 (Two) Times a Day., Disp: 60 tablet, Rfl: 11  •  furosemide (LASIX) 40 MG tablet, TAKE 1 TABLET BY MOUTH EVERY DAY, Disp: 30 tablet, Rfl: 5  •  loratadine (CLARITIN) 10 MG tablet, Take 10 mg by mouth Daily., Disp:  ", Rfl:   •  ONETOUCH VERIO test strip, , Disp: , Rfl:   •  SYNJARDY XR 12.5-1000 MG tablet sustained-release 24 hour, Take 2 tablets by mouth Daily., Disp: , Rfl:   •  valsartan (DIOVAN) 80 MG tablet, TAKE 1 TABLET BY MOUTH EVERY DAY, Disp: 30 tablet, Rfl: 2  •  glimepiride (AMARYL) 2 MG tablet, Take 2 mg by mouth Every Morning Before Breakfast., Disp: , Rfl:       Review of Systems   Constitutional: Positive for fatigue and unexpected weight change. Negative for chills and fever.   HENT: Positive for congestion.    Eyes: Negative for visual disturbance.   Respiratory: Positive for cough and shortness of breath.    Cardiovascular: Positive for leg swelling. Negative for chest pain and palpitations.        No orthopnea or PND   Gastrointestinal: Negative for abdominal distention, abdominal pain, constipation and diarrhea.   Endocrine: Negative.    Genitourinary: Negative.    Musculoskeletal: Negative.    Skin: Negative.    Allergic/Immunologic: Negative.    Neurological: Negative for dizziness and syncope.   Hematological: Negative for adenopathy. Does not bruise/bleed easily.   Psychiatric/Behavioral: Negative for confusion. The patient is not nervous/anxious.      There is a family history of heart disease in his father.    Objective   Vitals:    10/09/18 1143   BP: 130/84   BP Location: Right arm   Patient Position: Sitting   Cuff Size: Adult   Pulse: 82   SpO2: 97%   Weight: 92.3 kg (203 lb 6.4 oz)   Height: 180.3 cm (71\")     Physical Exam    Well-developed, well-nourished, in no acute distress.  Alert and oriented ×4.  Answers all questions appropriately.  No carotid bruit.  Regular rate and rhythm with no murmur.  He does have an S3.  Coarse crackles diffusely; no wheezing.  Respiratory effort is normal.  Abdomen is soft and nontender.  Bowel sounds are normoactive.  No bruit appreciated.  1+ pitting lower extremity edema without erythema or tenderness.  Pedal pulses are full bilaterally.  Station, gait, and " coordination are normal.  There is no gross motor neurologic deficit.        Billy was seen today for hypertension and shortness of breath.    Diagnoses and all orders for this visit:    Essential hypertension    LVH (left ventricular hypertrophy)    Acute combined systolic and diastolic congestive heart failure (CMS/HCC) - worsening    Type 2 diabetes mellitus without complication, without long-term current use of insulin (CMS/HCC)    Monoclonal gammopathy - new  -     Ambulatory Referral to Hematology    25 minutes of the 45 minute office visit were spent counseling the patient and answering his questions, as well as his wife's and his son's questions.  We reviewed the labs that were done in anticipation of today's office visit, including the elevated BNP which indicates a probable need for increased dose of Lasix.  He is due to have his heart cath tomorrow, so I will leave that decision to his cardiologist after the cath data has been obtained.  He has been started on a beta blocker and is maintained on an appropriate ARB.    With regard to his diabetes, we discussed which medications to hold in advance of the procedure tomorrow.  We will need to revisit the metformin as well, because of his heart failure.    With regard to the monoclonal gammopathy, we will refer him to the hematologist's for additional evaluation and recommendations.  I believe this needs to run concurrently with his cardiac evaluation, and this was discussed with the patient and his family.

## 2018-10-10 ENCOUNTER — HOSPITAL ENCOUNTER (INPATIENT)
Facility: HOSPITAL | Age: 63
LOS: 7 days | Discharge: HOME | End: 2018-10-17
Attending: INTERNAL MEDICINE | Admitting: INTERNAL MEDICINE

## 2018-10-10 ENCOUNTER — APPOINTMENT (OUTPATIENT)
Dept: GENERAL RADIOLOGY | Facility: HOSPITAL | Age: 63
End: 2018-10-10
Attending: INTERNAL MEDICINE

## 2018-10-10 ENCOUNTER — APPOINTMENT (OUTPATIENT)
Dept: CT IMAGING | Facility: HOSPITAL | Age: 63
End: 2018-10-10

## 2018-10-10 DIAGNOSIS — I51.7 LVH (LEFT VENTRICULAR HYPERTROPHY): ICD-10-CM

## 2018-10-10 DIAGNOSIS — E78.00 PURE HYPERCHOLESTEROLEMIA: ICD-10-CM

## 2018-10-10 DIAGNOSIS — J18.9 PNEUMONIA OF LEFT LOWER LOBE DUE TO INFECTIOUS ORGANISM: ICD-10-CM

## 2018-10-10 DIAGNOSIS — G47.33 OSA (OBSTRUCTIVE SLEEP APNEA): Primary | ICD-10-CM

## 2018-10-10 DIAGNOSIS — G47.34 NOCTURNAL HYPOXEMIA: ICD-10-CM

## 2018-10-10 DIAGNOSIS — G47.10 HYPERSOMNOLENCE: ICD-10-CM

## 2018-10-10 DIAGNOSIS — I10 ESSENTIAL HYPERTENSION: ICD-10-CM

## 2018-10-10 DIAGNOSIS — I27.20 PULMONARY HYPERTENSION (HCC): ICD-10-CM

## 2018-10-10 DIAGNOSIS — I50.41 ACUTE COMBINED SYSTOLIC AND DIASTOLIC CONGESTIVE HEART FAILURE (HCC): ICD-10-CM

## 2018-10-10 PROBLEM — I50.43 CHF (CONGESTIVE HEART FAILURE), NYHA CLASS III, ACUTE ON CHRONIC, COMBINED: Status: ACTIVE | Noted: 2018-10-10

## 2018-10-10 LAB
B2 MICROGLOB SERPL-MCNC: 3.4 MG/L (ref 0.8–2.2)
CRP SERPL-MCNC: 1.86 MG/DL (ref 0–0.5)
ERYTHROCYTE [SEDIMENTATION RATE] IN BLOOD: 12 MM/HR (ref 0–20)
GLUCOSE BLDC GLUCOMTR-MCNC: 129 MG/DL (ref 70–130)
HCT VFR BLDA CALC: 35 % (ref 38–51)
HCT VFR BLDA CALC: 36 % (ref 38–51)
HCT VFR BLDA CALC: 36 % (ref 38–51)
HGB BLDA-MCNC: 11.9 G/DL (ref 12–17)
HGB BLDA-MCNC: 12.2 G/DL (ref 12–17)
HGB BLDA-MCNC: 12.2 G/DL (ref 12–17)
LDH SERPL-CCNC: 228 U/L (ref 135–225)
SAO2 % BLDA: 58 % (ref 95–98)
SAO2 % BLDA: 58 % (ref 95–98)
SAO2 % BLDA: 94 % (ref 95–98)

## 2018-10-10 PROCEDURE — C1894 INTRO/SHEATH, NON-LASER: HCPCS | Performed by: INTERNAL MEDICINE

## 2018-10-10 PROCEDURE — 25010000002 FENTANYL CITRATE (PF) 100 MCG/2ML SOLUTION: Performed by: INTERNAL MEDICINE

## 2018-10-10 PROCEDURE — 4A023N8 MEASUREMENT OF CARDIAC SAMPLING AND PRESSURE, BILATERAL, PERCUTANEOUS APPROACH: ICD-10-PCS | Performed by: INTERNAL MEDICINE

## 2018-10-10 PROCEDURE — 25010000002 MIDAZOLAM PER 1 MG: Performed by: INTERNAL MEDICINE

## 2018-10-10 PROCEDURE — 85652 RBC SED RATE AUTOMATED: CPT | Performed by: INTERNAL MEDICINE

## 2018-10-10 PROCEDURE — B2151ZZ FLUOROSCOPY OF LEFT HEART USING LOW OSMOLAR CONTRAST: ICD-10-PCS | Performed by: INTERNAL MEDICINE

## 2018-10-10 PROCEDURE — 85014 HEMATOCRIT: CPT

## 2018-10-10 PROCEDURE — 83615 LACTATE (LD) (LDH) ENZYME: CPT | Performed by: INTERNAL MEDICINE

## 2018-10-10 PROCEDURE — 0 IOPAMIDOL PER 1 ML: Performed by: INTERNAL MEDICINE

## 2018-10-10 PROCEDURE — 25010000002 FUROSEMIDE PER 20 MG: Performed by: INTERNAL MEDICINE

## 2018-10-10 PROCEDURE — 25010000002 HEPARIN (PORCINE) PER 1000 UNITS: Performed by: INTERNAL MEDICINE

## 2018-10-10 PROCEDURE — 99152 MOD SED SAME PHYS/QHP 5/>YRS: CPT | Performed by: INTERNAL MEDICINE

## 2018-10-10 PROCEDURE — 85018 HEMOGLOBIN: CPT

## 2018-10-10 PROCEDURE — 82962 GLUCOSE BLOOD TEST: CPT

## 2018-10-10 PROCEDURE — 84165 PROTEIN E-PHORESIS SERUM: CPT | Performed by: INTERNAL MEDICINE

## 2018-10-10 PROCEDURE — 77075 RADEX OSSEOUS SURVEY COMPL: CPT

## 2018-10-10 PROCEDURE — 74176 CT ABD & PELVIS W/O CONTRAST: CPT

## 2018-10-10 PROCEDURE — 93460 R&L HRT ART/VENTRICLE ANGIO: CPT | Performed by: INTERNAL MEDICINE

## 2018-10-10 PROCEDURE — 86334 IMMUNOFIX E-PHORESIS SERUM: CPT | Performed by: INTERNAL MEDICINE

## 2018-10-10 PROCEDURE — 99255 IP/OBS CONSLTJ NEW/EST HI 80: CPT | Performed by: INTERNAL MEDICINE

## 2018-10-10 PROCEDURE — 86140 C-REACTIVE PROTEIN: CPT | Performed by: INTERNAL MEDICINE

## 2018-10-10 PROCEDURE — B2111ZZ FLUOROSCOPY OF MULTIPLE CORONARY ARTERIES USING LOW OSMOLAR CONTRAST: ICD-10-PCS | Performed by: INTERNAL MEDICINE

## 2018-10-10 PROCEDURE — 71250 CT THORAX DX C-: CPT

## 2018-10-10 PROCEDURE — 82784 ASSAY IGA/IGD/IGG/IGM EACH: CPT | Performed by: INTERNAL MEDICINE

## 2018-10-10 PROCEDURE — C1769 GUIDE WIRE: HCPCS | Performed by: INTERNAL MEDICINE

## 2018-10-10 PROCEDURE — 93010 ELECTROCARDIOGRAM REPORT: CPT | Performed by: INTERNAL MEDICINE

## 2018-10-10 PROCEDURE — 82232 ASSAY OF BETA-2 PROTEIN: CPT | Performed by: INTERNAL MEDICINE

## 2018-10-10 PROCEDURE — 93005 ELECTROCARDIOGRAM TRACING: CPT | Performed by: INTERNAL MEDICINE

## 2018-10-10 PROCEDURE — 84155 ASSAY OF PROTEIN SERUM: CPT | Performed by: INTERNAL MEDICINE

## 2018-10-10 PROCEDURE — 71046 X-RAY EXAM CHEST 2 VIEWS: CPT

## 2018-10-10 RX ORDER — ACETAMINOPHEN 325 MG/1
650 TABLET ORAL EVERY 4 HOURS PRN
Status: DISCONTINUED | OUTPATIENT
Start: 2018-10-10 | End: 2018-10-17 | Stop reason: HOSPADM

## 2018-10-10 RX ORDER — ONDANSETRON 4 MG/1
4 TABLET, FILM COATED ORAL EVERY 6 HOURS PRN
Status: DISCONTINUED | OUTPATIENT
Start: 2018-10-10 | End: 2018-10-17 | Stop reason: HOSPADM

## 2018-10-10 RX ORDER — ONDANSETRON 2 MG/ML
4 INJECTION INTRAMUSCULAR; INTRAVENOUS EVERY 6 HOURS PRN
Status: DISCONTINUED | OUTPATIENT
Start: 2018-10-10 | End: 2018-10-10 | Stop reason: SDUPTHER

## 2018-10-10 RX ORDER — FENTANYL CITRATE 50 UG/ML
INJECTION, SOLUTION INTRAMUSCULAR; INTRAVENOUS AS NEEDED
Status: DISCONTINUED | OUTPATIENT
Start: 2018-10-10 | End: 2018-10-10 | Stop reason: HOSPADM

## 2018-10-10 RX ORDER — FUROSEMIDE 10 MG/ML
80 INJECTION INTRAMUSCULAR; INTRAVENOUS EVERY 12 HOURS
Status: DISCONTINUED | OUTPATIENT
Start: 2018-10-10 | End: 2018-10-11

## 2018-10-10 RX ORDER — NALOXONE HCL 0.4 MG/ML
0.4 VIAL (ML) INJECTION
Status: DISCONTINUED | OUTPATIENT
Start: 2018-10-10 | End: 2018-10-17 | Stop reason: HOSPADM

## 2018-10-10 RX ORDER — ONDANSETRON 4 MG/1
4 TABLET, ORALLY DISINTEGRATING ORAL EVERY 6 HOURS PRN
Status: DISCONTINUED | OUTPATIENT
Start: 2018-10-10 | End: 2018-10-10 | Stop reason: SDUPTHER

## 2018-10-10 RX ORDER — FLUTICASONE PROPIONATE 50 MCG
2 SPRAY, SUSPENSION (ML) NASAL DAILY
COMMUNITY

## 2018-10-10 RX ORDER — ONDANSETRON 4 MG/1
4 TABLET, FILM COATED ORAL EVERY 6 HOURS PRN
Status: DISCONTINUED | OUTPATIENT
Start: 2018-10-10 | End: 2018-10-10 | Stop reason: SDUPTHER

## 2018-10-10 RX ORDER — LIDOCAINE HYDROCHLORIDE 10 MG/ML
0.1 INJECTION, SOLUTION EPIDURAL; INFILTRATION; INTRACAUDAL; PERINEURAL ONCE AS NEEDED
Status: DISCONTINUED | OUTPATIENT
Start: 2018-10-10 | End: 2018-10-10 | Stop reason: HOSPADM

## 2018-10-10 RX ORDER — ONDANSETRON 2 MG/ML
4 INJECTION INTRAMUSCULAR; INTRAVENOUS EVERY 6 HOURS PRN
Status: DISCONTINUED | OUTPATIENT
Start: 2018-10-10 | End: 2018-10-17 | Stop reason: HOSPADM

## 2018-10-10 RX ORDER — NALOXONE HCL 0.4 MG/ML
0.4 VIAL (ML) INJECTION
Status: DISCONTINUED | OUTPATIENT
Start: 2018-10-10 | End: 2018-10-10 | Stop reason: SDUPTHER

## 2018-10-10 RX ORDER — LIDOCAINE HYDROCHLORIDE 20 MG/ML
INJECTION, SOLUTION INFILTRATION; PERINEURAL AS NEEDED
Status: DISCONTINUED | OUTPATIENT
Start: 2018-10-10 | End: 2018-10-10 | Stop reason: HOSPADM

## 2018-10-10 RX ORDER — SODIUM CHLORIDE 0.9 % (FLUSH) 0.9 %
3-10 SYRINGE (ML) INJECTION AS NEEDED
Status: DISCONTINUED | OUTPATIENT
Start: 2018-10-10 | End: 2018-10-10 | Stop reason: HOSPADM

## 2018-10-10 RX ORDER — ONDANSETRON 4 MG/1
4 TABLET, ORALLY DISINTEGRATING ORAL EVERY 6 HOURS PRN
Status: DISCONTINUED | OUTPATIENT
Start: 2018-10-10 | End: 2018-10-17 | Stop reason: HOSPADM

## 2018-10-10 RX ORDER — HYDROCODONE BITARTRATE AND ACETAMINOPHEN 5; 325 MG/1; MG/1
1 TABLET ORAL EVERY 4 HOURS PRN
Status: DISCONTINUED | OUTPATIENT
Start: 2018-10-10 | End: 2018-10-10 | Stop reason: SDUPTHER

## 2018-10-10 RX ORDER — HYDROCODONE BITARTRATE AND ACETAMINOPHEN 5; 325 MG/1; MG/1
1 TABLET ORAL EVERY 4 HOURS PRN
Status: DISCONTINUED | OUTPATIENT
Start: 2018-10-10 | End: 2018-10-17 | Stop reason: HOSPADM

## 2018-10-10 RX ORDER — ACETAMINOPHEN 325 MG/1
650 TABLET ORAL EVERY 4 HOURS PRN
Status: DISCONTINUED | OUTPATIENT
Start: 2018-10-10 | End: 2018-10-10 | Stop reason: SDUPTHER

## 2018-10-10 RX ORDER — MORPHINE SULFATE 2 MG/ML
1 INJECTION, SOLUTION INTRAMUSCULAR; INTRAVENOUS EVERY 4 HOURS PRN
Status: DISCONTINUED | OUTPATIENT
Start: 2018-10-10 | End: 2018-10-17 | Stop reason: HOSPADM

## 2018-10-10 RX ORDER — SODIUM CHLORIDE 0.9 % (FLUSH) 0.9 %
3 SYRINGE (ML) INJECTION EVERY 12 HOURS SCHEDULED
Status: DISCONTINUED | OUTPATIENT
Start: 2018-10-10 | End: 2018-10-10 | Stop reason: HOSPADM

## 2018-10-10 RX ORDER — SODIUM CHLORIDE 9 MG/ML
75 INJECTION, SOLUTION INTRAVENOUS CONTINUOUS
Status: DISCONTINUED | OUTPATIENT
Start: 2018-10-10 | End: 2018-10-12

## 2018-10-10 RX ORDER — ZOLPIDEM TARTRATE 5 MG/1
TABLET ORAL
Qty: 2 TABLET | Refills: 0 | Status: SHIPPED | OUTPATIENT
Start: 2018-10-10 | End: 2019-04-06 | Stop reason: ALTCHOICE

## 2018-10-10 RX ORDER — CARVEDILOL 3.12 MG/1
3.12 TABLET ORAL 2 TIMES DAILY
Status: DISCONTINUED | OUTPATIENT
Start: 2018-10-10 | End: 2018-10-17 | Stop reason: HOSPADM

## 2018-10-10 RX ORDER — ATORVASTATIN CALCIUM 20 MG/1
20 TABLET, FILM COATED ORAL DAILY
Status: DISCONTINUED | OUTPATIENT
Start: 2018-10-10 | End: 2018-10-17 | Stop reason: HOSPADM

## 2018-10-10 RX ORDER — FLUTICASONE PROPIONATE 50 MCG
2 SPRAY, SUSPENSION (ML) NASAL DAILY
Status: DISCONTINUED | OUTPATIENT
Start: 2018-10-10 | End: 2018-10-17 | Stop reason: HOSPADM

## 2018-10-10 RX ORDER — GLIPIZIDE 5 MG/1
5 TABLET ORAL
Status: DISCONTINUED | OUTPATIENT
Start: 2018-10-10 | End: 2018-10-17 | Stop reason: HOSPADM

## 2018-10-10 RX ORDER — MORPHINE SULFATE 2 MG/ML
1 INJECTION, SOLUTION INTRAMUSCULAR; INTRAVENOUS EVERY 4 HOURS PRN
Status: DISCONTINUED | OUTPATIENT
Start: 2018-10-10 | End: 2018-10-10 | Stop reason: SDUPTHER

## 2018-10-10 RX ORDER — MIDAZOLAM HYDROCHLORIDE 1 MG/ML
INJECTION INTRAMUSCULAR; INTRAVENOUS AS NEEDED
Status: DISCONTINUED | OUTPATIENT
Start: 2018-10-10 | End: 2018-10-10 | Stop reason: HOSPADM

## 2018-10-10 RX ORDER — FUROSEMIDE 80 MG
80 TABLET ORAL 2 TIMES DAILY
Qty: 60 TABLET | Refills: 6 | Status: SHIPPED | OUTPATIENT
Start: 2018-10-10 | End: 2018-10-17 | Stop reason: HOSPADM

## 2018-10-10 RX ADMIN — FUROSEMIDE 80 MG: 10 INJECTION, SOLUTION INTRAMUSCULAR; INTRAVENOUS at 13:41

## 2018-10-10 RX ADMIN — SACUBITRIL AND VALSARTAN 1 TABLET: 24; 26 TABLET, FILM COATED ORAL at 23:16

## 2018-10-10 RX ADMIN — SODIUM CHLORIDE 75 ML/HR: 9 INJECTION, SOLUTION INTRAVENOUS at 10:20

## 2018-10-10 NOTE — CONSULTS
Subjective     REASON FOR CONSULTATION:  M PROTEIN IN BLOOD AND URINE, NORMAL CBC CMP CALCIUM, BRIGHT HEART ON ECHOCARDIOGRAM, LEFT VENTRICULAR DYSFUNCTION.CHF  Provide an opinion on any further workup or treatment                             REQUESTING PHYSICIAN:  DR FERRIS AND ERNESTINE CARMONA    RECORDS OBTAINED:  Records of the patients history including those obtained from the referring provider were reviewed and summarized in detail.        History of Present Illness I have been asked to see this patient in consultation today because he has been admitted to the hospital in order to further investigate why he has developed progressive congestive heart failure and left ventricular dysfunction. He has had a cardiac catheterization this morning, result of which is not available. The patient has had diabetes mellitus at least for 20 years with a hemoglobin A1c around 7 and he does not take care of the diabetes correctly. He has not had the development of complications of diabetes like peripheral neuropathy, nephropathy or retinopathy as far as he knows. In any event, Dr. Renae, his primary internal medicine physician, has obtained recently a urine specimen in random fashion for monoclonal protein that turned out to be positive for Bence-Lema protein. Also peripheral blood was obtained for serum protein electrophoresis that disclosed a monoclonal protein. This could not be quantitated because the amount was very small but the amount of IgG, IgA and IgM were reduced. In the background of myocardial dysfunction and this analysis of monoclonal protein in blood and urine raises the question if the patient has myeloma, lymphoma or amyloidosis.     The patient physically feels fatigued and tired but he is able to carry his job. He has shortness of breath upon exercise but he has no angina or palpitations. He has not had any paroxysmal nocturnal dyspnea or orthopnea. He has had a cold for the last 2 weeks with  "significant chest congestion, cough, sputum production and some element of wheezing and shortness of breath. His appetite has been stable. His weight is stable but the weight has been fluctuating a lot depending on the volume status with a lot of swelling in the lower extremities that comes and goes depending on the medicine that he takes. The patient has not had any fevers, chills, night sweats or pruritus. He has not had any abnormal bleeding, neither skeletal pain, neither peripheral neuropathy. He has not had any lesions in the skin. His bowel activity has been normal. He has no abdominal pain or distention. No jaundice. He has no heartburn, no indigestion. Normal bowel activity. Urination is ongoing with large volumes. No hematuria. He has not had any other neurological symptomatology. He has no joint pain. He has not had any other symptoms.      Past Medical History:   Diagnosis Date   • Chronic diastolic heart failure (CMS/HCC) 9/11/2018   • Diabetes mellitus (CMS/HCC)    • Hyperlipidemia    • Hypertension    • Seizure (CMS/HCC)     'possible, passed out and not sure why\"        Past Surgical History:   Procedure Laterality Date   • VASECTOMY          No current facility-administered medications on file prior to encounter.      Current Outpatient Prescriptions on File Prior to Encounter   Medication Sig Dispense Refill   • atorvastatin (LIPITOR) 20 MG tablet Take 20 mg by mouth Daily.     • carvedilol (COREG) 3.125 MG tablet Take 1 tablet by mouth 2 (Two) Times a Day. 60 tablet 11   • glimepiride (AMARYL) 2 MG tablet Take 2 mg by mouth Every Morning Before Breakfast.     • loratadine (CLARITIN) 10 MG tablet Take 10 mg by mouth Daily.     • SYNJARDY XR 12.5-1000 MG tablet sustained-release 24 hour Take 2 tablets by mouth Daily.     • valsartan (DIOVAN) 80 MG tablet TAKE 1 TABLET BY MOUTH EVERY DAY 30 tablet 2        ALLERGIES:  No Known Allergies     Social History     Social History   • Marital status: "      Social History Main Topics   • Smoking status: Never Smoker   • Smokeless tobacco: Never Used   • Alcohol use No   • Drug use: No   • Sexual activity: Defer     Other Topics Concern   • Not on file        Family History   Problem Relation Age of Onset   • Heart disease Father    • No Known Problems Mother         Review of Systems   General: no fever, no chills, STATED fatigue,UP/DOWN weight changes, no lack of appetite.  Eyes: no epiphora, xerophthalmia,conjunctivitis, pain, glaucoma, blurred vision, blindness, secretion, photophobia, proptosis, diplopia.  Ears: no otorrhea, tinnitus, otorrhagia, deafness, pain, vertigo.  Nose: no rhinorrhea, no epistaxis, no alteration in perception of odors, no sinuses pressure.  Mouth: no alteration in gums or teeth,  No ulcers, no difficulty with mastication or deglut ion, no odynophagia.  Neck: no masses or pain, no thyroid alterations, no pain in muscles or arteries, no carotid odynia, no crepitation.  Respiratory: STATED cough, YELLOW sputum production,STATED dyspnea,no trepopnea, no pleuritic pain,no hemoptysis.  Heart: no syncope, no irregularity, no palpitations, no angina,no orthopnea,no paroxysmal nocturnal dyspnea.  Vascular Venous: no tenderness,no edema,no palpable cords,no postphlebitic syndrome, no skin changes no ulcerations.  Vascular Arterial: no distal ischemia, noclaudication, no gangrene, no neuropathic ischemic pain, no skin ulcers, no paleness no cyanosis.  GI: no dysphagia, no odynophagia, no regurgitation, no heartburn,no indigestion,no nausea,no vomiting,no hematemesis ,no melena,no jaundice,no distention, no obstipation,no enterorrhagia,no proctalgia,no anal  lesions, no changes in bowel habits.  : no frequency, no hesitancy, no hematuria, no discharge,no  pain.  Musculoskeletal: no muscle or tendon pain or inflammation,no  joint pain, no edema, no functional limitation,no fasciculations, no mass.  Neurologic: no headache, no seizures,  noalterations on Craneal nerves, no motor deficit, no sensory deficit, normal coordination, no alteration in memory,normal orientation, calculation,normal writting, verbal and written language.  Skin: no rashes,no pruritus no localized lesions.  Psychiatric: no anxiety, no depression,no agitation, no delusions, proper insight.    Objective     Vitals:    10/10/18 1145 10/10/18 1200 10/10/18 1215 10/10/18 1235   BP: 116/84 112/75 117/87 116/78   BP Location:    Left arm   Patient Position:    Lying   Pulse: 66 65 66 66   Resp: 18 16 16 18   Temp:       TempSrc:       SpO2: 92% 94% 94% 96%   Weight:       Height:         No flowsheet data found.    Physical Exam    GENERAL:  Well-developed, well-nourished  Patient  in no acute distress.   SKIN:  Warm, dry ,NO rashes,NO purpura ,NO petechiae.  HEENT:  Pupils were equal and reactive to light and accomodation, conjunctivas non injected, no pterigion, normal extraocular movements, normal visual acuity.   Mouth mucosa was moist, no exudates in oropharynx, normal gum line, normal roof of the mouth and pillars, normal papillations of the tongue.  NECK:  Supple with good range of motion; no thyromegaly or masses, no JVD or bruits, no cervical adenopathies.No carotid arteries pain, no carotid abnormal pulsation , NO arterial dance.  LYMPHATICS:  No cervical, NO supraclavicular, NO axillary,NO epitrochlear , NO inguinal adenopathy.  CHEST:  Normal excursion of both clare thoraces, normal voice fremitus, no subcutaneous emphysema, normal axillas, no rashes or acanthosis nigricans. Lungs clear to percussion and auscultation, normal breath sounds bilaterally, BILATERAL wheezing,NO crackles BILATERAL ronchi, NO stridor, NO rubs.  CARDIAC AND VASCULAR:  normal rate and regular rhythm, with BASAL PULMONIC MURMUR 3/6 ,NO rubs NO S3 NO S4 right or left . Normal femoral, popliteal, pedis, brachial and carotid pulses.  ABDOMEN:  Soft, nontender with no organomegaly or masses, no ascites,  no collateral circulation,no distention,no [place]fork sign, no abdominal pain, no inguinal hernias,no umbilical hernia, no abdominal bruits. Normal bowel sounds.  GENITAL: Not  Performed.  EXTREMITIES  AND SPINE:  No clubbing, cyanosis or edema, no deformities or pain .No kyphosis, scoliosis, deformities or pain in spine, ribs or pelvic bone.  NEUROLOGICAL:  Patient was awake, alert, oriented to time, person and place.NORMAL VIBRATORY SENSATION        RECENT LABS:GARY & PE, Random Urine   Order: [id number]   Status:  Final result   Visible to patient:  Yes (MyChart)    Ref Range & Units 8d ago   Total Protein, Urine Not Estab. mg/dL 187.0    Albumin, U % 26.9    Alpha-1-Globulin, U % 0.7    Alpha-2-Globulin, U % 4.8    Beta Globulin, U % 63.5    Gamma Globulin, Urine % 4.1    M-Memo, % U Not Observed % 56.5     Immunofixation Result, Urine  Comment    Comment: Bence Lema Protein positive; lambda type.   Note:  Comment    Comment: Protein electrophoresis scan will follow via computer, mail, or    delivery.    Resulting Agency  [facility]   Narrative     Performed at:  [address] [facility]  [id number]            GARY + PE   Order: [id number]   Status:  Final result   Visible to patient:  Yes (MyChart)    Ref Range & Units 8d ago   IgG 700 - 1,600 mg/dL 569     IgA 61 - 437 mg/dL 33     Comment: Result confirmed on concentration.   IgM 20 - 172 mg/dL 14     Comment: Result confirmed on concentration.   Total Protein 6.0 - 8.5 g/dL 6.2    Albumin 2.9 - 4.4 g/dL 3.7    Alpha-1-Globulin 0.0 - 0.4 g/dL 0.3    Alpha-2-Globulin 0.4 - 1.0 g/dL 0.7    Beta Globulin 0.7 - 1.3 g/dL 0.9    Gamma Globulin 0.4 - 1.8 g/dL 0.6    M-Memo Not Observed g/dL Comment:    Comment: Due to the small quantity of monoclonal protein, unable to   quantitate the M-spike.    Globulin 2.2 - 3.9 g/dL 2.5    A/G Ratio 0.7 - 1.7 1.5    Immunofixation Reflex, Serum  Comment    Comment: Serum GARY reveals the presence of  monoclonal            Basic Metabolic Panel   Order: [id number]   Status:  Final result   Visible to patient:  Yes (MyChart)   Dx:  Acute on chronic diastolic heart fail...   Newer results are available. Click to view them now.    Ref Range & Units 8d ago   Glucose 65 - 99 mg/dL 106     BUN 8 - 23 mg/dL 21    Creatinine 0.76 - 1.27 mg/dL 1.21    eGFR Non African Am >60 mL/min/1.73 61    eGFR African Am >60 mL/min/1.73 73    BUN/Creatinine Ratio 7.0 - 25.0 17.4    Sodium 136 - 145 mmol/L 142    Potassium 3.5 - 5.2 mmol/L 4.6    Chloride 98 - 107 mmol/L 102    Total CO2 22.0 - 29.0 mmol/L 27.2    Calcium 8.6 - 10.5 mg/dL 9.0            & Differential   Order: [id number]   Status:  Final result   Visible to patient:  Yes (MyChart)   Dx:  Cough; Vasovagal syncope; AMOS (dyspne...   Notes recorded by Mae Amaro on 6/28/2018 at 3:30 PM EDT  Continue plan as noted in visit.   Newer results are available. Click to view them now.    Ref Range & Units 5mo ago   WBC 4.50 - 10.70 10*3/mm3 7.78    RBC 4.60 - 6.00 10*6/mm3 5.08    Hemoglobin 13.7 - 17.6 g/dL 14.9    Hematocrit 40.4 - 52.2 % 47.2    MCV 79.8 - 96.2 fL 92.9    MCH 27.0 - 32.7 pg 29.3    MCHC 32.6 - 36.4 g/dL 31.6     RDW 11.5 - 14.5 % 14.6     Platelets 140 - 500 10*3/mm3 208    Neutrophil Rel % 42.7 - 76.0 % 69.3    Lymphocyte Rel % 19.6 - 45.3 % 21.5    Monocyte Rel % 5.0 - 12.0 % 7.7    Eosinophil Rel % 0.3 - 6.2 % 1.2    Basophil Rel % 0.0 - 1.5 % 0.3    Neutrophils Absolute 1.90 - 8.10 10*3/mm3 5.40    Lymphocytes Absolute 0.90 - 4.80 10*3/mm3 1.67                Hematology No results found for: WBC, RBC, HGB, HCT, PLT   CRANIAL CT SCAN WITHOUT CONTRAST     CLINICAL HISTORY: possible sz/hit head     COMPARISON: None.     TECHNIQUE: Radiation dose reduction techniques were utilized, including  automated exposure control and exposure modulation based on body size.  Multiple axial images of the head were obtained without contrast.      FINDINGS:  There are no  abnormal areas of increased density or mass  effect.      Ventricles, sulci, and cisterns appear normal. Bone window  images show right parietal region extracranial soft tissue swelling with  scalp hematoma. There is no associated fracture. There is some mild and  chronic appearing mucosal thickening in the right maxillary sinus.              IMPRESSION:  1. No acute intracranial abnormality.                     This study was performed with techniques to keep radiation doses as low  as reasonably achievable (ALARA). Individualized dose reduction  techniques using automated exposure control or adjustment of mA and/or  kV according to the patient size were employed.      This report was finalized on 10/29/2017 8:46 PM by Demetrio Anaya MD.         Assessment/Plan  In summary, this patient who is 63 has been having progressive amount of shortness of breath since 12/2017 and recent assessment has documented progressive worsening of left ventricular function. The patient has not had any history of coronary disease and has raised the question if the recent so-called viral infection has produced some element of viral myocarditis. He has had a cardiac catheterization this morning. The report is not available in Epic yet. On the other hand, the patient has been found to have a monoclonal protein in a random urine specimen and also minimal monoclonal protein not measurable in serum protein electrophoresis. This raises the question where we are. The patient has hypogammaglobulinemia, normal calcium, normal creatinine. The differential diagnosis of the condition that he could have with these monoclonal protein and if we put together some myocardial dysfunction most likely will point out into amyloidosis. On the other hand, it is impossible to rule out the possibility of multiple myeloma or lymphoma producing monoclonal proteins. Therefore, under the present circumstances, I discussed with the patient, his wife and his adopted  son who is in healthcare the following issues.   1. We will proceed with further assessment of his condition, checking a urine protein quantification of 24 hours for monoclonal protein studies and doing a serum protein electrophoresis, immunofixation and quantification of immunoglobulins.   2. We will do a sedimentation rate and C-reactive protein as well as LDH.   3. We are going to proceed with a skeletal bone survey.   4. We will proceed with a CT scan of the chest, abdomen and pelvis with no IV contrast. REMIND OURSELVES THAT IV CONTRAST IN PATIENTS WITH MONOCLONAL PROTEIN CAN TRIGGER RENAL INSUFFICIENCY. I MADE THE PATIENT AND WIFE AND ADOPTED SON AWARE OF THESE CIRCUMSTANCES TO AVOID CONTRAST AT ANY COST IN THE FUTURE.   5. Once that we have all the analysis of this initial testing then we will decide how to proceed in regard to tissue diagnosis. For example if a lymph node is enlarged, we will need to remove it or take a sample. If a lytic lesion is found, we will need to take a bone marrow specimen or a bone biopsy and if none of this is available then we will need to proceed with bone marrow testing and consider myocardial biopsy.     I will discuss all these facts with Dr. Deleon once that the reports of this testing starts to become available. The patient was ready to proceed as well as his wife and adopted son.

## 2018-10-10 NOTE — H&P (VIEW-ONLY)
Billy Bourne  1955  Date of Office Visit: 10/1/18  Encounter Provider: Wm Deleon MD  Place of Service: [facility] CARDIOLOGY      CHIEF COMPLAINT:  Syncope  Bilateral lower extremity edema  Cardiomyopathy    HISTORY OF PRESENT ILLNESS:Dr. Van  I had the pleasure of seeing your patient Billy Bourne in follow-up today.  Very pleasant 62-year-old gentleman with a medical history of diabetes mellitus, hypertension, and hyperlipidemia who I initially saw secondary to syncope. At that time, the patient did undergo a transthoracic echocardiogram and Zio Patch.  The transthoracic echocardiogram at that time showed moderate left ventricular hypertrophy and pseudo normalization of diastolic function; however, the ejection fraction was preserved.  The patient has been following with you and has been complaining of bilateral lower extremity edema that has been moderate in intensity and only mildly improved with elevation.  In addition, he has noticed dyspnea on exertion that also is at least moderate if not severe.  It comes on when he walks up stairs or walks on flat ground at a brisk pace.  It recovers within one to minutes of rest.  He had no issues with chest pain.  He had laboratory work ordered including a proBNP at 1,667.  He was subsequently placed on ACE inhibitor and a transthoracic echocardiogram repeat was ordered.  This showed a normal left ventricular cavity size but moderate left ventricular hypertrophy once again.  The ejection fraction was now decreased at about 40%.  He had mild to moderate mitral and tricuspid valve regurgitation.  The right ventricular systolic pressure suggested moderate to severe pulmonary hypertension with a pressure measurement of 67 mmHg.    He states, since starting his diuretic therapy, that he has had a mild improvement in his dyspnea and swelling; however, it is still present.      Review of Systems   Constitution: Negative for  fever, weakness and malaise/fatigue.   HENT: Negative for nosebleeds and sore throat.    Eyes: Negative for blurred vision and double vision.   Cardiovascular: Positive for dyspnea on exertion and leg swelling. Negative for chest pain, claudication, palpitations and syncope.   Respiratory: Negative for cough, shortness of breath and snoring.    Endocrine: Negative for cold intolerance, heat intolerance and polydipsia.   Skin: Negative for itching, poor wound healing and rash.   Musculoskeletal: Negative for joint pain, joint swelling, muscle weakness and myalgias.   Gastrointestinal: Negative for abdominal pain, melena, nausea and vomiting.   Neurological: Negative for light-headedness, loss of balance, seizures and vertigo.   Psychiatric/Behavioral: Negative for altered mental status and depression.       Past Medical History:   Diagnosis Date   • Chronic diastolic heart failure (CMS/HCC) 9/11/2018   • Colon polyps    • Diabetes mellitus (CMS/HCC)    • Hyperlipidemia    • Hypertension    • Seizures (CMS/HCC)        The following portions of the patient's history were reviewed and updated as appropriate: Social history , Family history and Surgical history     Current Outpatient Prescriptions on File Prior to Visit   Medication Sig Dispense Refill   • atorvastatin (LIPITOR) 20 MG tablet Take 20 mg by mouth Daily.     • furosemide (LASIX) 40 MG tablet Take 1 tablet by mouth Daily. 30 tablet 0   • glimepiride (AMARYL) 2 MG tablet Take 2 mg by mouth Every Morning Before Breakfast.     • loratadine (CLARITIN) 10 MG tablet Take 10 mg by mouth Daily.     • ONETOUCH VERIO test strip      • sildenafil (REVATIO) 20 MG tablet 2-4 TABLET BY MOUTH AS NEEDED AS DIRECTED  5   • SYNJARDY XR 12.5-1000 MG tablet sustained-release 24 hour Take 1 tablet by mouth Daily.     • valsartan (DIOVAN) 80 MG tablet TAKE 1 TABLET BY MOUTH EVERY DAY 30 tablet 2   • [DISCONTINUED] hydrochlorothiazide (HYDRODIURIL) 25 MG tablet Take 1 tablet by  "mouth Daily. 30 tablet 2     No current facility-administered medications on file prior to visit.        No Known Allergies    Vitals:    10/01/18 1404   BP: 134/86   Pulse: 79   Weight: 88.5 kg (195 lb)   Height: 180.3 cm (71\")     Physical Exam   Constitutional: He is oriented to person, place, and time. He appears well-developed and well-nourished.   HENT:   Head: Normocephalic and atraumatic.   Eyes: Conjunctivae and EOM are normal. No scleral icterus.   Neck: Normal range of motion. Neck supple. Normal carotid pulses, no hepatojugular reflux and no JVD present. Carotid bruit is not present. No tracheal deviation present. No thyromegaly present.   Cardiovascular: Normal rate and regular rhythm.  Exam reveals no gallop and no friction rub.    No murmur heard.  Pulses:       Carotid pulses are 2+ on the right side, and 2+ on the left side.       Radial pulses are 2+ on the right side, and 2+ on the left side.        Femoral pulses are 2+ on the right side, and 2+ on the left side.       Dorsalis pedis pulses are 2+ on the right side, and 2+ on the left side.        Posterior tibial pulses are 2+ on the right side, and 2+ on the left side.   Pulmonary/Chest: Breath sounds normal. No respiratory distress. He has no decreased breath sounds. He has no wheezes. He has no rhonchi. He has no rales. He exhibits no tenderness.   Abdominal: Soft. Bowel sounds are normal. He exhibits no distension. There is no tenderness. There is no rebound.   Musculoskeletal: He exhibits no edema or deformity.   Neurological: He is alert and oriented to person, place, and time. He has normal strength. No sensory deficit.   Skin: No rash noted. No erythema.   Psychiatric: He has a normal mood and affect. His behavior is normal.     No components found for: CBC  No results found for: CMP  No components found for: LIPID  No results found for: BMP      ECG 12 Lead  Date/Time: 10/1/2018 3:33 PM  Performed by: RODRIGO FERRIS " by: RODRIGO FERRIS   Comparison: compared with previous ECG from 12/1/2017  Similar to previous ECG  Rhythm: sinus rhythm  Rate: normal  Conduction: incomplete RBBB  QRS axis: right  Other findings: LAE and PRWP  Clinical impression: abnormal ECG             9/19/18  · Calculated EF = 42%. Estimated EF was in agreement with the calculated EF. Estimated EF = 42%. Global Longitudinal LV strain = -10.7%. Strain data was reviewed and speckle tracking was considered accurate. The global longitudinal strain is markedly abnormal.  · Normal left ventricular cavity size noted. There is left ventricular global hypokinesis noted. Left ventricular wall thickness is consistent with moderate concentric hypertrophy. The findings are consistent with infiltrative cardiomyopathy. There is an echo bright appearance to the left and right ventricular myocardium suspicious for amyloid heart disease. . Left ventricular diastolic function was indeterminate. Elevated left atrial pressure.  · Normal right ventricular cavity size noted. Right ventricular wall thickness is consistent with moderate hypertrophy. Borderline low right ventricular systolic function noted.  · Left atrial volume is mildly increased.  · Right atrial cavity size is mildly dilated  · The valve was poorly visualized but appears trileaflet. The aortic valve is abnormal in structure. There is mild thickening of the aortic valve.  · Mild-to-moderate mitral valve regurgitation is present  · Mild-to-moderate mitral valve regurgitation is present.  · Mild to moderate tricuspid valve regurgitation is present. Estimated right ventricular systolic pressure from tricuspid regurgitation is markedly elevated (>55 mmHg). Calculated right ventricular systolic pressure from tricuspid regurgitation is 67 mmHg.  · There is a trivial pericardial effusion.         I had the pleasure of seeing the patient today.  He is 63 years old and presents to me for evaluation of acute systolic  and diastolic heart failure, moderate left ventricular hypertrophy, at least moderate pulmonary hypertension, and essential hypertension.  He also has mild proteinuria and diabetes mellitus.      His decreased ejection fraction is new along with his volume overload.  Today, he does appear to be euvolemic but continues to complain of dyspnea on exertion.    1. Acute combined systolic and diastolic heart failure.  - I am unclear of the etiology but I do think that certain things need to be ruled out.  I would recommend a right and left heart catheterization to rule out coronary artery disease as an etiology for his decreased ejection fraction.  - This will also allow me to assess his filling pressures and his pulmonary pressures after his mild diuresis.  - I would recommend continuing the valsartan and adding low-dose carvedilol therapy to address his heart failure.  - In addition, with the appearance of his myocardium, I do think getting a SPEP and UPEP is reasonable.  He also does have mild proteinuria and certainly does have an echocardiogram bright appearance of the myocardium.  - It is possible that he did have a viral etiology, as he did have a viral illness while he was visiting his kids in [place], but I think that this should be more of a diagnosis of exclusion in his case.  2. Essential hypertension reasonably controlled.  He will have additional improvement with the small dose of carvedilol that we will be starting.  3. Pulmonary hypertension likely elevated from where it should be, as his echocardiogram was done in the setting of volume overload.  - I will measure his pulmonary pressures on right heart catheterization.    We will touch base with him after his workup is ongoing.

## 2018-10-10 NOTE — CONSULTS
[facility]  Phone: [phone]  Michel Brown MD      Subjective   LOS: 0 days     Thank you for this consultation.  63-year-old male who had a right heart catheter showed high PA pressures along with high wedge.  We were asked to see.  History is the patient having a syncopal fall in a mall last year.  He subsequently had workup including an echocardiogram that showed a normal ejection fraction.  He developed shortness of breath that gradually has been getting worse.  He reports leg swelling.  His proBNP was elevated.  A repeat echocardiogram showed decrease in ejection fraction.  He was admitted for further workup by cardiology.  His urine proteins were positive and he has a positive M spike.  He is being evaluated by hematology as well.  Patient is a never smoker.  He has insignificant environmental exposure.  Prior to last year no shortness of breath or symptoms.  No significant previous medical history.  Denies hypertension or heart disease.  He does have diabetes mellitus for the last 10 or 15 years.  He states it is well-controlled.  He denies any kidney disease.  No personal history of cancer.  His family history is of father with early heart disease and a current smoker.  Patient reports no significant snoring at night.  He has some daytime sleepiness if he sits quietly.  He has never been previously tested for sleep apnea.  He denies any hypercoagulable disorders.  There is no family history of pulmonary hypertension.  Patient denies any cough nausea or vomiting fever chills or rigors.    Billy Bourne  reports that he does not drink alcohol.,  reports that he has never smoked. He has never used smokeless tobacco.     Past Hx:  has a past medical history of Chronic diastolic heart failure (CMS/HCC) (9/11/2018); Diabetes mellitus (CMS/HCC); Hyperlipidemia; Hypertension; and Seizure (CMS/HCC).  Surg Hx:  has a past surgical history that includes Vasectomy.  FH: family history includes Heart  disease in his father; No Known Problems in his mother.  SH:  reports that he has never smoked. He has never used smokeless tobacco. He reports that he does not drink alcohol or use drugs.    Prescriptions Prior to Admission   Medication Sig Dispense Refill Last Dose   • atorvastatin (LIPITOR) 20 MG tablet Take 20 mg by mouth Daily.   10/10/2018 at Unknown time   • carvedilol (COREG) 3.125 MG tablet Take 1 tablet by mouth 2 (Two) Times a Day. 60 tablet 11 10/10/2018 at Unknown time   • fluticasone (FLONASE) 50 MCG/ACT nasal spray 2 sprays into the nostril(s) as directed by provider Daily.   10/9/2018 at Unknown time   • glimepiride (AMARYL) 2 MG tablet Take 2 mg by mouth Every Morning Before Breakfast.   10/9/2018 at Unknown time   • loratadine (CLARITIN) 10 MG tablet Take 10 mg by mouth Daily.   10/9/2018 at Unknown time   • SYNJARDY XR 12.5-1000 MG tablet sustained-release 24 hour Take 2 tablets by mouth Daily.   10/9/2018 at Unknown time   • valsartan (DIOVAN) 80 MG tablet TAKE 1 TABLET BY MOUTH EVERY DAY 30 tablet 2 10/10/2018 at Unknown time     No Known Allergies    Review of Systems   Constitutional: Negative for chills and fever.   HENT: Negative for congestion, postnasal drip, sore throat and trouble swallowing.    Eyes: Negative for redness and visual disturbance.   Respiratory: Positive for shortness of breath. Negative for cough and wheezing.    Cardiovascular: Positive for leg swelling. Negative for chest pain and palpitations.   Gastrointestinal: Negative for abdominal pain, diarrhea, nausea and vomiting.   Endocrine: Negative for cold intolerance and heat intolerance.   Genitourinary: Negative for frequency and urgency.   Musculoskeletal: Negative for arthralgias and back pain.   Skin: Negative for pallor and rash.   Neurological: Negative for seizures and headaches.   Psychiatric/Behavioral: Negative for behavioral problems. The patient is not nervous/anxious.      Vital Signs past 24hrs  BP range:  BP: (112-145)/() 113/75  Pulse range: Heart Rate:  [63-86] 69  Resp rate range: Resp:  [16-18] 18  Temp range: Temp (24hrs), Av.9 °F (36.6 °C), Min:97.6 °F (36.4 °C), Max:98.2 °F (36.8 °C)    Oxygen range: SpO2:  [88 %-99 %] 97 %; Flow (L/min):  [2-3] 3;   Device (Oxygen Therapy): nasal cannula  90 kg (198 lb 8 oz); Body mass index is 27.69 kg/m².  I/O this shift:  In: 275 [P.O.:200; I.V.:75]  Out: -     .  Adult male who is laying in bed.  No distress.  Currently on nasal cannula.  He was equal and react to light.  Oropharynx class IV Mallampati airway.  Posterior pharyngeal discharge.  Nasopharynx without discharge septum midline.  JVP elevated.  Trachea midline thyroid not enlarged.  Lungs reveal bilateral air entry.  Mild coarse breath sounds but otherwise mostly clear.  Minimal disease may be present and could be due to fluid overload.  Percussion note resonant chest expansion equal no chest wall deformities or tenderness.  Heart examination S1-S2 present with regular rhythm.  No murmurs.  At least 1+ edema lower extremities.  Abdomen is soft, nontender.  Bowel sounds present no liver spleen enlargement.  No peripheral cyanosis or clubbing.  Moves all 4 extremities sensory motor intact.  I did not appreciate any obvious adenopathy in the cervical, axillary, inguinal areas.  Patient does have a class IV Mallampati airway.    Results Review:    I have reviewed the laboratory and imaging data from current admission. My annotations are as noted in assessment and plan.    Medication Review:  I have reviewed the current MAR. My annotations are as noted in assessment and plan.    Plan   PCCM Problems  WHO class II pulmonary hypertension  Decompensated congestive heart failure, diastolic and systolic  Risk factors for obstructive sleep apnea with hypersomnolence, snoring and typical airway  M spike on SPEP    Plan of Treatment  Agree with diuresis fluid overload.  Manage as per cardiology.    Patient requires  outpatient testing for obstructive sleep apnea.  Prior to discharge he requires a nocturnal oximetry study to qualify for oxygen with sleep if appropriate.    Await further workup by hematology.    Thank you for this interesting case.    Part of this note may be an electronic transcription/translation of spoken language to printed text using the Dragon Dictation System.

## 2018-10-10 NOTE — DISCHARGE INSTRUCTIONS
[facility]  [address] [place] [address]    Coronary Angiogram (Radial/Ulnar Approach) After Care    Refer to this sheet in the next few weeks. These instructions provide you with information on caring for yourself after your procedure. Your caregiver may also give you more specific instructions. Your treatment has been planned according to current medical practices, but problems sometimes occur. Call your caregiver if you have any problems or questions after your procedure.    Home Care Instructions:  · You may shower the day after the procedure. Remove the bandage (dressing) and gently wash the site with plain soap and water. Gently pat the site dry. You may apply a band aid daily for 2 days if desired.    · Do not apply powder or lotion to the site.  · Do not submerge the affected site in water for 3 to 5 days or until the site is completely healed.   · Do not lift, push or pull anything over 10 pounds for 2 days after your procedure.  · Inspect the site at least twice daily. You may notice some bruising at the site and it may be tender for 1 to 2 weeks.     · Increase your fluid intake for the next 2 days.    · Keep arm elevated for 24 hours. For the remainder of the day, keep your arm in “Pledge of Allegiance” position when up and about.     · You may drive 24 hours after the procedure unless otherwise instructed by your caregiver.  · Do not operate machinery or power tools for 24 hours.  · A responsible adult should be with you for the first 24 hours after you arrive home. Do not make any important legal decisions or sign legal papers for 24 hours.  Do not drink alcohol for 24 hours.    · Metformin or any medications containing Metformin should not be taken for 48 hours after your procedure.      Call Your Doctor if:   · You have unusual pain at the radial/ulnar (wrist) site.  · You have redness, warmth, swelling, or pain at the radial/ulnar (wrist) site.  · You have drainage (other  than a small amount of blood on the dressing).  · You have chills or a fever > 101.  · Your arm becomes pale or dark, cool, tingly, or numb.  · You have heavy bleeding from the site, hold pressure on the site for 20 minutes.  If the bleeding stops, apply a fresh bandage and call your cardiologist.  However, if you continue to have bleeding, call 911.

## 2018-10-11 ENCOUNTER — TELEPHONE (OUTPATIENT)
Dept: GENERAL RADIOLOGY | Facility: HOSPITAL | Age: 63
End: 2018-10-11

## 2018-10-11 ENCOUNTER — TELEPHONE (OUTPATIENT)
Dept: CARDIOLOGY | Facility: CLINIC | Age: 63
End: 2018-10-11

## 2018-10-11 LAB
ALBUMIN SERPL-MCNC: 3.6 G/DL (ref 2.9–4.4)
ALBUMIN/GLOB SERPL: 1.5 {RATIO} (ref 0.7–1.7)
ALPHA1 GLOB FLD ELPH-MCNC: 0.3 G/DL (ref 0–0.4)
ALPHA2 GLOB SERPL ELPH-MCNC: 0.8 G/DL (ref 0.4–1)
ANION GAP SERPL CALCULATED.3IONS-SCNC: 14.6 MMOL/L
B-GLOBULIN SERPL ELPH-MCNC: 0.9 G/DL (ref 0.7–1.3)
BUN BLD-MCNC: 26 MG/DL (ref 8–23)
BUN/CREAT SERPL: 25.2 (ref 7–25)
CALCIUM SPEC-SCNC: 8.8 MG/DL (ref 8.6–10.5)
CHLORIDE SERPL-SCNC: 101 MMOL/L (ref 98–107)
CO2 SERPL-SCNC: 25.4 MMOL/L (ref 22–29)
CREAT BLD-MCNC: 1.03 MG/DL (ref 0.76–1.27)
DEPRECATED RDW RBC AUTO: 48.4 FL (ref 37–54)
ERYTHROCYTE [DISTWIDTH] IN BLOOD BY AUTOMATED COUNT: 15.2 % (ref 11.5–14.5)
GAMMA GLOB SERPL ELPH-MCNC: 0.5 G/DL (ref 0.4–1.8)
GFR SERPL CREATININE-BSD FRML MDRD: 73 ML/MIN/1.73
GLOBULIN SER CALC-MCNC: 2.5 G/DL (ref 2.2–3.9)
GLUCOSE BLD-MCNC: 134 MG/DL (ref 65–99)
GLUCOSE BLDC GLUCOMTR-MCNC: 217 MG/DL (ref 70–130)
HCT VFR BLD AUTO: 40.8 % (ref 40.4–52.2)
HGB BLD-MCNC: 12.7 G/DL (ref 13.7–17.6)
IGA SERPL-MCNC: 34 MG/DL (ref 61–437)
IGG SERPL-MCNC: 563 MG/DL (ref 700–1600)
IGM SERPL-MCNC: 17 MG/DL (ref 20–172)
INTERPRETATION SERPL IEP-IMP: ABNORMAL
LDH SERPL-CCNC: 214 U/L (ref 135–225)
Lab: ABNORMAL
M-SPIKE: ABNORMAL G/DL
MCH RBC QN AUTO: 27.3 PG (ref 27–32.7)
MCHC RBC AUTO-ENTMCNC: 31.1 G/DL (ref 32.6–36.4)
MCV RBC AUTO: 87.7 FL (ref 79.8–96.2)
PLATELET # BLD AUTO: 179 10*3/MM3 (ref 140–500)
PMV BLD AUTO: 11 FL (ref 6–12)
POTASSIUM BLD-SCNC: 3.4 MMOL/L (ref 3.5–5.2)
POTASSIUM BLD-SCNC: 3.9 MMOL/L (ref 3.5–5.2)
PROCALCITONIN SERPL-MCNC: 0.07 NG/ML (ref 0.1–0.25)
PROT SERPL-MCNC: 6.1 G/DL (ref 6–8.5)
RBC # BLD AUTO: 4.65 10*6/MM3 (ref 4.6–6)
SODIUM BLD-SCNC: 141 MMOL/L (ref 136–145)
WBC NRBC COR # BLD: 7.17 10*3/MM3 (ref 4.5–10.7)

## 2018-10-11 PROCEDURE — 87040 BLOOD CULTURE FOR BACTERIA: CPT | Performed by: INTERNAL MEDICINE

## 2018-10-11 PROCEDURE — 82962 GLUCOSE BLOOD TEST: CPT

## 2018-10-11 PROCEDURE — 87205 SMEAR GRAM STAIN: CPT | Performed by: INTERNAL MEDICINE

## 2018-10-11 PROCEDURE — 80048 BASIC METABOLIC PNL TOTAL CA: CPT | Performed by: INTERNAL MEDICINE

## 2018-10-11 PROCEDURE — 86335 IMMUNFIX E-PHORSIS/URINE/CSF: CPT | Performed by: INTERNAL MEDICINE

## 2018-10-11 PROCEDURE — 85027 COMPLETE CBC AUTOMATED: CPT | Performed by: INTERNAL MEDICINE

## 2018-10-11 PROCEDURE — 99233 SBSQ HOSP IP/OBS HIGH 50: CPT | Performed by: INTERNAL MEDICINE

## 2018-10-11 PROCEDURE — 84156 ASSAY OF PROTEIN URINE: CPT | Performed by: INTERNAL MEDICINE

## 2018-10-11 PROCEDURE — 84166 PROTEIN E-PHORESIS/URINE/CSF: CPT | Performed by: INTERNAL MEDICINE

## 2018-10-11 PROCEDURE — 84132 ASSAY OF SERUM POTASSIUM: CPT | Performed by: INTERNAL MEDICINE

## 2018-10-11 PROCEDURE — 87486 CHLMYD PNEUM DNA AMP PROBE: CPT | Performed by: INTERNAL MEDICINE

## 2018-10-11 PROCEDURE — 84145 PROCALCITONIN (PCT): CPT | Performed by: INTERNAL MEDICINE

## 2018-10-11 PROCEDURE — 87798 DETECT AGENT NOS DNA AMP: CPT | Performed by: INTERNAL MEDICINE

## 2018-10-11 PROCEDURE — 83615 LACTATE (LD) (LDH) ENZYME: CPT | Performed by: INTERNAL MEDICINE

## 2018-10-11 PROCEDURE — 87633 RESP VIRUS 12-25 TARGETS: CPT | Performed by: INTERNAL MEDICINE

## 2018-10-11 PROCEDURE — 25010000002 FUROSEMIDE PER 20 MG: Performed by: INTERNAL MEDICINE

## 2018-10-11 PROCEDURE — 93005 ELECTROCARDIOGRAM TRACING: CPT | Performed by: INTERNAL MEDICINE

## 2018-10-11 PROCEDURE — 99232 SBSQ HOSP IP/OBS MODERATE 35: CPT | Performed by: INTERNAL MEDICINE

## 2018-10-11 PROCEDURE — 81050 URINALYSIS VOLUME MEASURE: CPT | Performed by: INTERNAL MEDICINE

## 2018-10-11 PROCEDURE — 87581 M.PNEUMON DNA AMP PROBE: CPT | Performed by: INTERNAL MEDICINE

## 2018-10-11 PROCEDURE — 93010 ELECTROCARDIOGRAM REPORT: CPT | Performed by: INTERNAL MEDICINE

## 2018-10-11 PROCEDURE — 87070 CULTURE OTHR SPECIMN AEROBIC: CPT | Performed by: INTERNAL MEDICINE

## 2018-10-11 RX ORDER — POTASSIUM CHLORIDE 750 MG/1
40 CAPSULE, EXTENDED RELEASE ORAL AS NEEDED
Status: DISCONTINUED | OUTPATIENT
Start: 2018-10-11 | End: 2018-10-17 | Stop reason: HOSPADM

## 2018-10-11 RX ORDER — CEFDINIR 300 MG/1
300 CAPSULE ORAL EVERY 12 HOURS SCHEDULED
Status: DISCONTINUED | OUTPATIENT
Start: 2018-10-11 | End: 2018-10-17 | Stop reason: HOSPADM

## 2018-10-11 RX ORDER — POTASSIUM CHLORIDE 1.5 G/1.77G
40 POWDER, FOR SOLUTION ORAL AS NEEDED
Status: DISCONTINUED | OUTPATIENT
Start: 2018-10-11 | End: 2018-10-17 | Stop reason: HOSPADM

## 2018-10-11 RX ORDER — FUROSEMIDE 40 MG/1
40 TABLET ORAL DAILY
COMMUNITY
End: 2018-10-17 | Stop reason: HOSPADM

## 2018-10-11 RX ORDER — POTASSIUM CHLORIDE 7.45 MG/ML
10 INJECTION INTRAVENOUS
Status: DISCONTINUED | OUTPATIENT
Start: 2018-10-11 | End: 2018-10-17 | Stop reason: HOSPADM

## 2018-10-11 RX ORDER — FUROSEMIDE 10 MG/ML
80 INJECTION INTRAMUSCULAR; INTRAVENOUS ONCE
Status: COMPLETED | OUTPATIENT
Start: 2018-10-11 | End: 2018-10-11

## 2018-10-11 RX ORDER — FUROSEMIDE 10 MG/ML
80 INJECTION INTRAMUSCULAR; INTRAVENOUS EVERY 8 HOURS
Status: DISCONTINUED | OUTPATIENT
Start: 2018-10-11 | End: 2018-10-15

## 2018-10-11 RX ORDER — ALBUTEROL SULFATE 2.5 MG/3ML
2.5 SOLUTION RESPIRATORY (INHALATION) EVERY 6 HOURS PRN
Status: DISCONTINUED | OUTPATIENT
Start: 2018-10-11 | End: 2018-10-17 | Stop reason: HOSPADM

## 2018-10-11 RX ADMIN — GLIPIZIDE 5 MG: 5 TABLET ORAL at 06:59

## 2018-10-11 RX ADMIN — SACUBITRIL AND VALSARTAN 1 TABLET: 24; 26 TABLET, FILM COATED ORAL at 20:47

## 2018-10-11 RX ADMIN — FUROSEMIDE 80 MG: 10 INJECTION, SOLUTION INTRAMUSCULAR; INTRAVENOUS at 00:55

## 2018-10-11 RX ADMIN — ATORVASTATIN CALCIUM 20 MG: 20 TABLET, FILM COATED ORAL at 07:47

## 2018-10-11 RX ADMIN — CARVEDILOL 3.12 MG: 6.25 TABLET, FILM COATED ORAL at 22:38

## 2018-10-11 RX ADMIN — FUROSEMIDE 80 MG: 10 INJECTION, SOLUTION INTRAMUSCULAR; INTRAVENOUS at 20:47

## 2018-10-11 RX ADMIN — POTASSIUM CHLORIDE 40 MEQ: 750 CAPSULE, EXTENDED RELEASE ORAL at 12:47

## 2018-10-11 RX ADMIN — CARVEDILOL 3.12 MG: 6.25 TABLET, FILM COATED ORAL at 07:48

## 2018-10-11 RX ADMIN — SACUBITRIL AND VALSARTAN 1 TABLET: 24; 26 TABLET, FILM COATED ORAL at 07:48

## 2018-10-11 RX ADMIN — POTASSIUM CHLORIDE 40 MEQ: 750 CAPSULE, EXTENDED RELEASE ORAL at 17:56

## 2018-10-11 RX ADMIN — FUROSEMIDE 80 MG: 10 INJECTION, SOLUTION INTRAMUSCULAR; INTRAVENOUS at 12:47

## 2018-10-11 NOTE — PLAN OF CARE
Problem: Patient Care Overview  Goal: Plan of Care Review  Outcome: Ongoing (interventions implemented as appropriate)   10/11/18 0512   Plan of Care Review   Progress no change   OTHER   Outcome Summary Pt is s/p cardiac cath, no interventions, right radial and brachial sites asymptomatic, good pulses, pt denies pain or discomfort, continues IV lasix with good ouput, 24 hour urine started at 2000 last night.     Goal: Discharge Needs Assessment  Outcome: Ongoing (interventions implemented as appropriate)      Problem: Cardiac Catheterization (Diagnostic/Interventional) (Adult)  Goal: Signs and Symptoms of Listed Potential Problems Will be Absent, Minimized or Managed (Cardiac Catheterization)  Outcome: Ongoing (interventions implemented as appropriate)

## 2018-10-11 NOTE — PROGRESS NOTES
Subjective   M PROTEIN IN BLOOD AND URINE, NORMAL CBC CMP CALCIUM, BRIGHT HEART ON ECHOCARDIOGRAM, LEFT VENTRICULAR DYSFUNCTION.CHF      History of Present Illness  I have been asked to see this patient in consultation today because he has been admitted to the hospital in order to further investigate why he has developed progressive congestive heart failure and left ventricular dysfunction. He has had a cardiac catheterization this morning, result of which is not available. The patient has had diabetes mellitus at least for 20 years with a hemoglobin A1c around 7 and he does not take care of the diabetes correctly. He has not had the development of complications of diabetes like peripheral neuropathy, nephropathy or retinopathy as far as he knows. In any event, Dr. Renae, his primary internal medicine physician, has obtained recently a urine specimen in random fashion for monoclonal protein that turned out to be positive for Bence-Lema protein. Also peripheral blood was obtained for serum protein electrophoresis that disclosed a monoclonal protein. This could not be quantitated because the amount was very small but the amount of IgG, IgA and IgM were reduced. In the background of myocardial dysfunction and this analysis of monoclonal protein in blood and urine raises the question if the patient has myeloma, lymphoma or amyloidosis.      The patient physically feels fatigued and tired but he is able to carry his job. He has shortness of breath upon exercise but he has no angina or palpitations. He has not had any paroxysmal nocturnal dyspnea or orthopnea. He has had a cold for the last 2 weeks with significant chest congestion, cough, sputum production and some element of wheezing and shortness of breath. His appetite has been stable. His weight is stable but the weight has been fluctuating a lot depending on the volume status with a lot of swelling in the lower extremities that comes and goes depending on  the medicine that he takes. The patient has not had any fevers, chills, night sweats or pruritus. He has not had any abnormal bleeding, neither skeletal pain, neither peripheral neuropathy. He has not had any lesions in the skin. His bowel activity has been normal. He has no abdominal pain or distention. No jaundice. He has no heartburn, no indigestion. Normal bowel activity. Urination is ongoing with large volumes. No hematuria. He has not had any other neurological symptomatology. He has no joint pain. He has not had any other symptoms    Past Medical History, Past Surgical History, Social History, Family History have been reviewed and are without significant changes except as mentioned.    Review of Systems unchanged from yesterday  Medications:  The current medication list was reviewed in the EMR    ALLERGIES:  No Known Allergies    Objective      Vitals:    10/10/18 1900 10/10/18 2316 10/11/18 0500 10/11/18 0700   BP: 113/77 102/64 100/69 104/70   BP Location: Left arm Left arm  Left arm   Patient Position: Lying Lying  Lying   Pulse: 73 72 69 71   Resp: 18 18 18   Temp: 97.8 °F (36.6 °C) 97.8 °F (36.6 °C)     TempSrc: Oral Oral     SpO2: 95% 91%  94%   Weight:   88 kg (194 lb)    Height:         No flowsheet data found.    Physical Exam  GENERAL:  Well-developed, well-nourished  Patient  in no acute distress.   SKIN:  Warm, dry ,NO rashes,NO purpura ,NO petechiae.  HEENT:  Pupils were equal and reactive to light and accomodation, conjunctivas non injected, no pterigion, normal extraocular movements, normal visual acuity.   Mouth mucosa was moist, no exudates in oropharynx, normal gum line, normal roof of the mouth and pillars, normal papillations of the tongue.  NECK:  Supple with good range of motion; no thyromegaly or masses, no JVD or bruits, no cervical adenopathies.No carotid arteries pain, no carotid abnormal pulsation , NO arterial dance.  LYMPHATICS:  No cervical, NO supraclavicular, NO axillary,NO  epitrochlear , NO inguinal adenopathy.  CHEST:  Normal excursion of both clare thoraces, normal voice fremitus, no subcutaneous emphysema, normal axillas, no rashes or acanthosis nigricans. Lungs clear to percussion and auscultation, normal breath sounds bilaterally, BILATERAL wheezing,NO crackles BILATERAL ronchi, NO stridor, NO rubs.  CARDIAC AND VASCULAR:  normal rate and regular rhythm, with BASAL PULMONIC MURMUR 3/6 ,NO rubs NO S3 NO S4 right or left . Normal femoral, popliteal, pedis, brachial and carotid pulses.  ABDOMEN:  Soft, nontender with no organomegaly or masses, no ascites, no collateral circulation,no distention,no Andrea sign, no abdominal pain, no inguinal hernias,no umbilical hernia, no abdominal bruits. Normal bowel sounds.  GENITAL: Not  Performed.  EXTREMITIES  AND SPINE:  No clubbing, cyanosis or edema, no deformities or pain .No kyphosis, scoliosis, deformities or pain in spine, ribs or pelvic bone.  NEUROLOGICAL:  Patient was awake, alert, oriented to time, person and place.NORMAL VIBRATORY SENSATION       RECENT LABS:  Hematology WBC   Date Value Ref Range Status   10/11/2018 7.17 4.50 - 10.70 10*3/mm3 Final     RBC   Date Value Ref Range Status   10/11/2018 4.65 4.60 - 6.00 10*6/mm3 Final     Hemoglobin   Date Value Ref Range Status   10/11/2018 12.7 (L) 13.7 - 17.6 g/dL Final     Hematocrit   Date Value Ref Range Status   10/11/2018 40.8 40.4 - 52.2 % Final     Platelets   Date Value Ref Range Status   10/11/2018 179 140 - 500 10*3/mm3 Final      BONE SURVEY COMPLETE     CLINICAL HISTORY: Monoclonal gammopathy. Possible multiple myeloma.     A standard bone survey was obtained. No discrete lytic or blastic  lesions are identified. There are no fractures. Mild to moderate  degenerative disc changes are noted throughout the spine, most prominent  in the thoracic region.     IMPRESSIONS: No lytic lesions are identified.     This report was finalized on 10/10/2018 6:56 PM by Dr. Contreras  ZAK Albright.  CT CHEST WO CONTRAST-, CT ABDOMEN PELVIS WO CONTRAST-     CLINICAL HISTORY: 63-year-old male with monoclonal gammopathy. Possible  multiple myeloma. Progressively worsening dyspnea over the past year.     TECHNIQUE: Spiral CT images were obtained through the chest abdomen and  pelvis with IV contrast and were reconstructed in 3 mm thick slices.     Radiation dose reduction techniques were utilized, including automated  exposure control and exposure modulation based on body size.     COMPARISON: None     FINDINGS: There is no mediastinal or hilar or axillary lymphadenopathy.  Lung window images demonstrate focal reticulonodular infiltrate in the  superior segment of the left lower lobe consistent with a small area of  pneumonia. No areas of dense consolidation are identified. There are no  discrete lung masses. There are no pleural effusions. The liver and  spleen and pancreas and kidneys and adrenal glands are unremarkable.  Multiple gallstones fill the gallbladder lumen. The gallbladder is  otherwise unremarkable. There is no bile duct dilatation. There is a  small amount of ascites adjacent to the liver and spleen. The stomach  and small and large bowel are unremarkable. There is no lymphadenopathy  in the abdomen or pelvis.     Bone window images demonstrate no lytic or blastic lesions.     IMPRESSION:  Minimal focal reticulonodular infiltrate in the superior  segment of the left lower lobe consistent with pneumonia..  Cholelithiasis. Minimal ascites. Otherwise unremarkable CT scan of the  chest abdomen and pelvis. No osseous lytic lesions are identified.     This report was finalized on 10/10/2018 6:54 PM by Dr. Ben Albright M.D.  Beta 2 Microglobulin, Serum   Order: [id number]   Status:  Final result   Visible to patient:  No (Not Released)    Ref Range & Units 1d ago   Beta-2 Microglobulin 0.8 - 2.2 mg/L 3.4     [facility]  [facility]      Specimen Collected: 10/10/18 14:58 Last  Resulted: 10/10/18 16:15            Lactate Dehydrogenase   Order: [id number]   Status:  Final result   Visible to patient:  No (Not Released)   Newer results are available. Click to view them now.    Ref Range & Units 1d ago    - 225 U/L 228     Resulting [facility]      Specimen Collected: 10/10/18 14:58 Last Resulted: 10/10/18             -reactive Protein   Order: [id number]   Status:  Final result   Visible to patient:  No (Not Released)    Ref Range & Units 1d ago   C-Reactive Protein 0.00 - 0.50 mg/dL 1.86     Resulting [facility]      Specimen Collected: 10/10/18 14:58 Last Resulted: 10/10/18 16:15              Sedimentation Rate   Order: [id number]   Status:  Final result   Visible to patient:  No (Not Released)    Ref Range & Units 1d ago   Sed Rate 0 - 20 mm/hr 12    Resulting [facility]      Specimen Collected: 10/10/18 14:58 Last Resulted: 10/10/18 16:19                    Assessment/Plan  In summary, this patient who is 63 has been having progressive amount of shortness of breath since 12/2017 and recent assessment has documented progressive worsening of left ventricular function. The patient has not had any history of coronary disease and has raised the question if the recent so-called viral infection has produced some element of viral myocarditis. He has had a cardiac catheterization this morning. The report is not available in Epic yet. On the other hand, the patient has been found to have a monoclonal protein in a random urine specimen and also minimal monoclonal protein not measurable in serum protein electrophoresis. This raises the question where we are. The patient has hypogammaglobulinemia, normal calcium, normal creatinine. The differential diagnosis of the condition that he could have with these monoclonal protein and if we put together some myocardial dysfunction most likely will point out into amyloidosis. On the other hand, it is impossible to rule out the  possibility of multiple myeloma or lymphoma producing monoclonal proteins. Therefore, under the present circumstances, I discussed with the patient, his wife and his adopted son who is in healthcare the following issues.   1. We will proceed with further assessment of his condition, checking a urine protein quantification of 24 hours for monoclonal protein studies and doing a serum protein electrophoresis, immunofixation and quantification of immunoglobulins.   2. We will do a sedimentation rate and C-reactive protein as well as LDH.   3. We are going to proceed with a skeletal bone survey.   4. We will proceed with a CT scan of the chest, abdomen and pelvis with no IV contrast. REMIND OURSELVES THAT IV CONTRAST IN PATIENTS WITH MONOCLONAL PROTEIN CAN TRIGGER RENAL INSUFFICIENCY. I MADE THE PATIENT AND WIFE AND ADOPTED SON AWARE OF THESE CIRCUMSTANCES TO AVOID CONTRAST AT ANY COST IN THE FUTURE.   I reviewed today his radiological analysis including skeletal bone survey that shows no evidence of osteoporosis, osteopenia or lytic lesions at any level. I also reviewed the CT scan of the chest, abdomen and pelvis.  The CT scan of the chest unequivocally documents that the patient has left upper lobe pneumonia.  The patient has no pleural effusion. He has cardiomegaly. He has normal liver and spleen anatomy, no mediastinal or retroperitoneal adenopathies, normal pancreas, normal kidney anatomy and no masses or alterations in the skeleton.  His LDH, sedimentation rate and C-reactive protein are all into normal limits.  His beta-2 microglobulin is minimally elevated.  His urine collection of 24-hours will be completed tonight. His monoclonal proteins and blood sample were obtained yesterday.  Therefore, from my point of view the likelihood of him having significant monoclonal gammopathy that will lead into diagnosis of myeloma or lymphoma or something of this nature is less likely now with the information that we have  available. Nevertheless, I am going to go ahead and make him an appointment to come back and see me in a couple of weeks in the office, review the monoclonal protein studies and go from there.      I discussed this with the patient in detail and he was ready to proceed. I will sign off on his case at this time.     I will leave Pulmonary Medicine to take care of his pneumonia.                         10/11/2018      CC:

## 2018-10-11 NOTE — TELEPHONE ENCOUNTER
Pt called requesting yesterday's hospital report be sent with [facility] paperwork. Thanks, Marcos

## 2018-10-11 NOTE — TELEPHONE ENCOUNTER
----- Message from James Canales MD sent at 10/11/2018  2:14 PM EDT -----   2 u vic with me cbc cmp in 2 weeks

## 2018-10-11 NOTE — PROGRESS NOTES
"Patient Care Team:  Enmanuel Renae MD as PCP - General (Internal Medicine)  Katelynn Figueroa MD as Consulting Physician (Endocrinology)  Billy Cortez OD as Consulting Physician (Optometry)    Chief Complaint: Follow up acute systolic and diastolic heart failure    Interval History:   Diuresing well with no complaints currently.    Objective   Vital Signs  Temp:  [97.6 °F (36.4 °C)-98.2 °F (36.8 °C)] 97.8 °F (36.6 °C)  Heart Rate:  [63-86] 71  Resp:  [16-18] 18  BP: (100-145)/() 104/70    Intake/Output Summary (Last 24 hours) at 10/11/18 0848  Last data filed at 10/11/18 0740   Gross per 24 hour   Intake              275 ml   Output             2425 ml   Net            -2150 ml     Flowsheet Rows      First Filed Value   Admission Height  180.3 cm (71\") Documented at 10/10/2018 1001   Admission Weight  90 kg (198 lb 8 oz) Documented at 10/10/2018 1005          General Appearance:    Alert, cooperative, in no acute distress   Head:    Normocephalic, without obvious abnormality, atraumatic       Neck:   No adenopathy, supple, no thyromegaly, no carotid bruit, no    JVD   Lungs:     Clear to auscultation bilaterally, no wheezes, rales, or     rhonchi    Heart:    Normal rate, regular rhythm,  No murmur, rub, or gallop   Chest Wall:    No abnormalities observed   Abdomen:     Normal bowel sounds, soft, non-tender, non-distended,            no rebound tenderness   Extremities:   No cyanosis, clubbing, or edema   Pulses:   Pulses palpable and equal bilaterally   Skin:   No bleeding or rash               atorvastatin 20 mg Oral Daily   carvedilol 3.125 mg Oral BID   enoxaparin 40 mg Subcutaneous Q24H   fluticasone 2 spray Nasal Daily   furosemide 80 mg Intravenous Q12H   glipiZIDE 5 mg Oral QAM AC   influenza vaccine 0.5 mL Intramuscular Once   sacubitril-valsartan 1 tablet Oral Q12H         sodium chloride 75 mL/hr Last Rate: 75 mL/hr (10/10/18 1020)       Results Review:      Results from last 7 " days  Lab Units 10/11/18  0336   SODIUM mmol/L 141   POTASSIUM mmol/L 3.4*   CHLORIDE mmol/L 101   CO2 mmol/L 25.4   BUN mg/dL 26*   CREATININE mg/dL 1.03   GLUCOSE mg/dL 134*   CALCIUM mg/dL 8.8           Results from last 7 days  Lab Units 10/11/18  0336   WBC 10*3/mm3 7.17   HEMOGLOBIN g/dL 12.7*   HEMATOCRIT % 40.8   PLATELETS 10*3/mm3 179                     @LABRCNT(bnp)@  I reviewed the patient's new clinical results.  I personally viewed and interpreted the patient's EKG/Telemetry data        Assessment/Plan   1.  Acute systolic and diastolic heart failure  2.  Pulmonary hypertension: Severe  3.  Left ventricular hypertrophy  4.  Essential hypertension  5.  Monoclonal protein: Evaluation ongoing      -Recommend continuing current dose of carvedilol and Entresto today.  If renal function is stable and he tolerates the current dose of Entresto I will consider up titration tomorrow  -Continue IV diuresis.  He is tolerating well.  Net -1.7 L yesterday.  Net -400 so far today.  -Heme evaluation is ongoing. Defer bone marrow biopsy or fat pad biopsy to them. He has typical noninvasive appearance of cardiac amyloidosis and if he has a positive biopsy for amyloid from a noncardiac site, I'm not sure endomyocardial biopsy would add much. I will await heme w/u

## 2018-10-12 ENCOUNTER — APPOINTMENT (OUTPATIENT)
Dept: MRI IMAGING | Facility: HOSPITAL | Age: 63
End: 2018-10-12
Attending: INTERNAL MEDICINE

## 2018-10-12 LAB
ANION GAP SERPL CALCULATED.3IONS-SCNC: 12.8 MMOL/L
B PERT DNA SPEC QL NAA+PROBE: NOT DETECTED
BUN BLD-MCNC: 19 MG/DL (ref 8–23)
BUN/CREAT SERPL: 19.8 (ref 7–25)
C PNEUM DNA NPH QL NAA+NON-PROBE: NOT DETECTED
CALCIUM SPEC-SCNC: 8.6 MG/DL (ref 8.6–10.5)
CHLORIDE SERPL-SCNC: 100 MMOL/L (ref 98–107)
CO2 SERPL-SCNC: 28.2 MMOL/L (ref 22–29)
CREAT BLD-MCNC: 0.96 MG/DL (ref 0.76–1.27)
FLUAV H1 2009 PAND RNA NPH QL NAA+PROBE: NOT DETECTED
FLUAV H1 HA GENE NPH QL NAA+PROBE: NOT DETECTED
FLUAV H3 RNA NPH QL NAA+PROBE: NOT DETECTED
FLUAV SUBTYP SPEC NAA+PROBE: NOT DETECTED
FLUBV RNA ISLT QL NAA+PROBE: NOT DETECTED
GFR SERPL CREATININE-BSD FRML MDRD: 79 ML/MIN/1.73
GLUCOSE BLD-MCNC: 219 MG/DL (ref 65–99)
GLUCOSE BLDC GLUCOMTR-MCNC: 133 MG/DL (ref 70–130)
GLUCOSE BLDC GLUCOMTR-MCNC: 150 MG/DL (ref 70–130)
GLUCOSE BLDC GLUCOMTR-MCNC: 163 MG/DL (ref 70–130)
GLUCOSE BLDC GLUCOMTR-MCNC: 219 MG/DL (ref 70–130)
GLUCOSE BLDC GLUCOMTR-MCNC: 288 MG/DL (ref 70–130)
HADV DNA SPEC NAA+PROBE: NOT DETECTED
HCOV 229E RNA SPEC QL NAA+PROBE: NOT DETECTED
HCOV HKU1 RNA SPEC QL NAA+PROBE: NOT DETECTED
HCOV NL63 RNA SPEC QL NAA+PROBE: NOT DETECTED
HCOV OC43 RNA SPEC QL NAA+PROBE: NOT DETECTED
HMPV RNA NPH QL NAA+NON-PROBE: NOT DETECTED
HPIV1 RNA SPEC QL NAA+PROBE: NOT DETECTED
HPIV2 RNA SPEC QL NAA+PROBE: NOT DETECTED
HPIV3 RNA NPH QL NAA+PROBE: NOT DETECTED
HPIV4 P GENE NPH QL NAA+PROBE: NOT DETECTED
M PNEUMO IGG SER IA-ACNC: NOT DETECTED
POTASSIUM BLD-SCNC: 3.8 MMOL/L (ref 3.5–5.2)
POTASSIUM BLD-SCNC: 3.9 MMOL/L (ref 3.5–5.2)
RHINOVIRUS RNA SPEC NAA+PROBE: NOT DETECTED
RSV RNA NPH QL NAA+NON-PROBE: NOT DETECTED
SODIUM BLD-SCNC: 141 MMOL/L (ref 136–145)

## 2018-10-12 PROCEDURE — 25010000002 FUROSEMIDE PER 20 MG: Performed by: INTERNAL MEDICINE

## 2018-10-12 PROCEDURE — 75561 CARDIAC MRI FOR MORPH W/DYE: CPT | Performed by: INTERNAL MEDICINE

## 2018-10-12 PROCEDURE — 99232 SBSQ HOSP IP/OBS MODERATE 35: CPT | Performed by: INTERNAL MEDICINE

## 2018-10-12 PROCEDURE — 99233 SBSQ HOSP IP/OBS HIGH 50: CPT | Performed by: INTERNAL MEDICINE

## 2018-10-12 PROCEDURE — 84132 ASSAY OF SERUM POTASSIUM: CPT | Performed by: INTERNAL MEDICINE

## 2018-10-12 PROCEDURE — 80048 BASIC METABOLIC PNL TOTAL CA: CPT | Performed by: INTERNAL MEDICINE

## 2018-10-12 PROCEDURE — 82962 GLUCOSE BLOOD TEST: CPT

## 2018-10-12 PROCEDURE — 75561 CARDIAC MRI FOR MORPH W/DYE: CPT

## 2018-10-12 PROCEDURE — 25010000002 ENOXAPARIN PER 10 MG: Performed by: INTERNAL MEDICINE

## 2018-10-12 PROCEDURE — 0 GADOBENATE DIMEGLUMINE 529 MG/ML SOLUTION: Performed by: INTERNAL MEDICINE

## 2018-10-12 PROCEDURE — A9577 INJ MULTIHANCE: HCPCS | Performed by: INTERNAL MEDICINE

## 2018-10-12 PROCEDURE — 63710000001 INSULIN ASPART PER 5 UNITS: Performed by: NURSE PRACTITIONER

## 2018-10-12 RX ORDER — DEXTROSE MONOHYDRATE 25 G/50ML
25 INJECTION, SOLUTION INTRAVENOUS
Status: DISCONTINUED | OUTPATIENT
Start: 2018-10-12 | End: 2018-10-17 | Stop reason: HOSPADM

## 2018-10-12 RX ORDER — NICOTINE POLACRILEX 4 MG
15 LOZENGE BUCCAL
Status: DISCONTINUED | OUTPATIENT
Start: 2018-10-12 | End: 2018-10-17 | Stop reason: HOSPADM

## 2018-10-12 RX ADMIN — CEFDINIR 300 MG: 300 CAPSULE ORAL at 09:43

## 2018-10-12 RX ADMIN — GADOBENATE DIMEGLUMINE 18 ML: 529 INJECTION, SOLUTION INTRAVENOUS at 12:26

## 2018-10-12 RX ADMIN — SACUBITRIL AND VALSARTAN 1 TABLET: 24; 26 TABLET, FILM COATED ORAL at 20:32

## 2018-10-12 RX ADMIN — FUROSEMIDE 80 MG: 10 INJECTION, SOLUTION INTRAMUSCULAR; INTRAVENOUS at 09:38

## 2018-10-12 RX ADMIN — CEFDINIR 300 MG: 300 CAPSULE ORAL at 20:30

## 2018-10-12 RX ADMIN — ENOXAPARIN SODIUM 40 MG: 40 INJECTION SUBCUTANEOUS at 11:31

## 2018-10-12 RX ADMIN — FUROSEMIDE 80 MG: 10 INJECTION, SOLUTION INTRAMUSCULAR; INTRAVENOUS at 15:19

## 2018-10-12 RX ADMIN — FUROSEMIDE 80 MG: 10 INJECTION, SOLUTION INTRAMUSCULAR; INTRAVENOUS at 00:24

## 2018-10-12 RX ADMIN — CARVEDILOL 3.12 MG: 6.25 TABLET, FILM COATED ORAL at 09:41

## 2018-10-12 RX ADMIN — GLIPIZIDE 5 MG: 5 TABLET ORAL at 07:16

## 2018-10-12 RX ADMIN — SACUBITRIL AND VALSARTAN 1 TABLET: 24; 26 TABLET, FILM COATED ORAL at 09:41

## 2018-10-12 RX ADMIN — INSULIN ASPART 3 UNITS: 100 INJECTION, SOLUTION INTRAVENOUS; SUBCUTANEOUS at 23:03

## 2018-10-12 RX ADMIN — ATORVASTATIN CALCIUM 20 MG: 20 TABLET, FILM COATED ORAL at 09:45

## 2018-10-12 NOTE — PROGRESS NOTES
[facility]  Phone: [phone]  Michel Brown MD    Subjective:  LOS: 1    Coughing up greenish phlegm, small amt. States for last 7-10 days (I did not get that hx yesterday). No fever or chest pain.    Objective   Vital Signs past 24hrs    Temp range: Temp (24hrs), Av.7 °F (36.5 °C), Min:97.6 °F (36.4 °C), Max:97.8 °F (36.6 °C)    BP range: BP: ()/(62-70) 103/68  Pulse range: Heart Rate:  [69-74] 74  Resp rate range: Resp:  [18] 18    Device (Oxygen Therapy): room airFlow (L/min):  [3] 3  Oxygen range:SpO2:  [91 %-94 %] 94 %      88 kg (194 lb); Body mass index is 27.06 kg/m².    Intake/Output Summary (Last 24 hours) at 10/11/18 2043  Last data filed at 10/11/18 1700   Gross per 24 hour   Intake              720 ml   Output             1825 ml   Net            -1105 ml       Physical Exam   Constitutional: He appears well-developed and well-nourished.   Cardiovascular: Normal rate and regular rhythm.    No murmur heard.  Pulmonary/Chest: He has no wheezes. He has no rales.   Mild coarse breath sounds that clear with cough   Abdominal: Soft. Bowel sounds are normal. There is no tenderness.   Musculoskeletal: He exhibits edema (at least 2+ still).   Neurological: He is alert.   Nursing note and vitals reviewed.    Results Review:    I have reviewed the laboratory and imaging data since the last note by [facility] physician.  My annotations are noted in assessment and plan.    Medication Review:  I have reviewed the current MAR.  My annotations are noted in assessment and plan.      sodium chloride 75 mL/hr Last Rate: 75 mL/hr (10/10/18 1020)     Plan   PCCM Problems  WHO class II pulmonary hypertension  Pneumonia  Decompensated congestive heart failure, diastolic and systolic  Risk factors for obstructive sleep apnea with hypersomnolence, snoring and typical airway  M spike on SPEP    Plan of Treatment  Continue diuresis per cards. ? Patient on fluids.    Note findings of CT. Interesting presentation  of pneumonia. Check cultures FIRST then start abx. Give Omnicef. MUST have fu CT in 8-10 weeks - ordered.    Note oncology input.    ? DVT prophylaxis.    Michel Brown MD  10/11/18  8:43 PM    Part of this note may be an electronic transcription/translation of spoken language to printed text using the Dragon Dictation System.

## 2018-10-12 NOTE — TELEPHONE ENCOUNTER
Called pt. He is still admitted and as soon as he is discharged I will sent them out. He is aware of this.    Tamiko

## 2018-10-12 NOTE — PROGRESS NOTES
Subjective   M PROTEIN IN BLOOD AND URINE, NORMAL CBC CMP CALCIUM, BRIGHT HEART ON ECHOCARDIOGRAM, LEFT VENTRICULAR DYSFUNCTION.CHF      History of Present Illness  I have been asked to see this patient in consultation today because he has been admitted to the hospital in order to further investigate why he has developed progressive congestive heart failure and left ventricular dysfunction. He has had a cardiac catheterization this morning, result of which is not available. The patient has had diabetes mellitus at least for 20 years with a hemoglobin A1c around 7 and he does not take care of the diabetes correctly. He has not had the development of complications of diabetes like peripheral neuropathy, nephropathy or retinopathy as far as he knows. In any event, Dr. Renae, his primary internal medicine physician, has obtained recently a urine specimen in random fashion for monoclonal protein that turned out to be positive for Bence-Lema protein. Also peripheral blood was obtained for serum protein electrophoresis that disclosed a monoclonal protein. This could not be quantitated because the amount was very small but the amount of IgG, IgA and IgM were reduced. In the background of myocardial dysfunction and this analysis of monoclonal protein in blood and urine raises the question if the patient has myeloma, lymphoma or amyloidosis.      The patient physically feels fatigued and tired but he is able to carry his job. He has shortness of breath upon exercise but he has no angina or palpitations. He has not had any paroxysmal nocturnal dyspnea or orthopnea. He has had a cold for the last 2 weeks with significant chest congestion, cough, sputum production and some element of wheezing and shortness of breath. His appetite has been stable. His weight is stable but the weight has been fluctuating a lot depending on the volume status with a lot of swelling in the lower extremities that comes and goes depending on  the medicine that he takes. The patient has not had any fevers, chills, night sweats or pruritus. He has not had any abnormal bleeding, neither skeletal pain, neither peripheral neuropathy. He has not had any lesions in the skin. His bowel activity has been normal. He has no abdominal pain or distention. No jaundice. He has no heartburn, no indigestion. Normal bowel activity. Urination is ongoing with large volumes. No hematuria. He has not had any other neurological symptomatology. He has no joint pain. He has not had any other symptoms    Past Medical History, Past Surgical History, Social History, Family History have been reviewed and are without significant changes except as mentioned.    Review of Systems unchanged from yesterday  Medications:  The current medication list was reviewed in the EMR    ALLERGIES:  No Known Allergies    Objective      Vitals:    10/12/18 0500 10/12/18 0810 10/12/18 1131 10/12/18 1308   BP:  101/68 99/65 104/70   BP Location:  Right arm Left arm    Patient Position:  Lying Sitting    Pulse:  70 73 77   Resp:  18 18    Temp:  97.5 °F (36.4 °C) 98.1 °F (36.7 °C)    TempSrc:  Oral Oral    SpO2:  96% 95%    Weight: 87 kg (191 lb 12.8 oz)      Height:         No flowsheet data found.    Physical Exam  GENERAL:  Well-developed, well-nourished  Patient  in no acute distress.   SKIN:  Warm, dry ,NO rashes,NO purpura ,NO petechiae.  HEENT:  Pupils were equal and reactive to light and accomodation, conjunctivas non injected, no pterigion, normal extraocular movements, normal visual acuity.   Mouth mucosa was moist, no exudates in oropharynx, normal gum line, normal roof of the mouth and pillars, normal papillations of the tongue.  NECK:  Supple with good range of motion; no thyromegaly or masses, no JVD or bruits, no cervical adenopathies.No carotid arteries pain, no carotid abnormal pulsation , NO arterial dance.  LYMPHATICS:  No cervical, NO supraclavicular, NO axillary,NO epitrochlear , NO  inguinal adenopathy.  CHEST:  Normal excursion of both clare thoraces, normal voice fremitus, no subcutaneous emphysema, normal axillas, no rashes or acanthosis nigricans. Lungs clear to percussion and auscultation, normal breath sounds bilaterally, BILATERAL wheezing,NO crackles BILATERAL ronchi, NO stridor, NO rubs.  CARDIAC AND VASCULAR:  normal rate and regular rhythm, with BASAL PULMONIC MURMUR 3/6 ,NO rubs NO S3 NO S4 right or left . Normal femoral, popliteal, pedis, brachial and carotid pulses.  ABDOMEN:  Soft, nontender with no organomegaly or masses, no ascites, no collateral circulation,no distention,no Andrea sign, no abdominal pain, no inguinal hernias,no umbilical hernia, no abdominal bruits. Normal bowel sounds.  GENITAL: Not  Performed.  EXTREMITIES  AND SPINE:  No clubbing, cyanosis or edema, no deformities or pain .No kyphosis, scoliosis, deformities or pain in spine, ribs or pelvic bone.  NEUROLOGICAL:  Patient was awake, alert, oriented to time, person and place.NORMAL VIBRATORY SENSATION       RECENT LABS:  Hematology WBC   Date Value Ref Range Status   10/11/2018 7.17 4.50 - 10.70 10*3/mm3 Final     RBC   Date Value Ref Range Status   10/11/2018 4.65 4.60 - 6.00 10*6/mm3 Final     Hemoglobin   Date Value Ref Range Status   10/11/2018 12.7 (L) 13.7 - 17.6 g/dL Final     Hematocrit   Date Value Ref Range Status   10/11/2018 40.8 40.4 - 52.2 % Final     Platelets   Date Value Ref Range Status   10/11/2018 179 140 - 500 10*3/mm3 Final      BONE SURVEY COMPLETE     CLINICAL HISTORY: Monoclonal gammopathy. Possible multiple myeloma.     A standard bone survey was obtained. No discrete lytic or blastic  lesions are identified. There are no fractures. Mild to moderate  degenerative disc changes are noted throughout the spine, most prominent  in the thoracic region.     IMPRESSIONS: No lytic lesions are identified.     This report was finalized on 10/10/2018 6:56 PM by Dr. Ben Albright M.D.  CT CHEST  WO CONTRAST-, CT ABDOMEN PELVIS WO CONTRAST-     CLINICAL HISTORY: 63-year-old male with monoclonal gammopathy. Possible  multiple myeloma. Progressively worsening dyspnea over the past year.     TECHNIQUE: Spiral CT images were obtained through the chest abdomen and  pelvis with IV contrast and were reconstructed in 3 mm thick slices.     Radiation dose reduction techniques were utilized, including automated  exposure control and exposure modulation based on body size.     COMPARISON: None     FINDINGS: There is no mediastinal or hilar or axillary lymphadenopathy.  Lung window images demonstrate focal reticulonodular infiltrate in the  superior segment of the left lower lobe consistent with a small area of  pneumonia. No areas of dense consolidation are identified. There are no  discrete lung masses. There are no pleural effusions. The liver and  spleen and pancreas and kidneys and adrenal glands are unremarkable.  Multiple gallstones fill the gallbladder lumen. The gallbladder is  otherwise unremarkable. There is no bile duct dilatation. There is a  small amount of ascites adjacent to the liver and spleen. The stomach  and small and large bowel are unremarkable. There is no lymphadenopathy  in the abdomen or pelvis.     Bone window images demonstrate no lytic or blastic lesions.     IMPRESSION:  Minimal focal reticulonodular infiltrate in the superior  segment of the left lower lobe consistent with pneumonia..  Cholelithiasis. Minimal ascites. Otherwise unremarkable CT scan of the  chest abdomen and pelvis. No osseous lytic lesions are identified.     This report was finalized on 10/10/2018 6:54 PM by Dr. Ben Albright M.D.  Beta 2 Microglobulin, Serum   Order: [id number]   Status:  Final result   Visible to patient:  No (Not Released)    Ref Range & Units 1d ago   Beta-2 Microglobulin 0.8 - 2.2 mg/L 3.4     Resulting Agency  [facility]      Specimen Collected: 10/10/18 14:58 Last Resulted: 10/10/18 16:15             Lactate Dehydrogenase   Order: [id number]   Status:  Final result   Visible to patient:  No (Not Released)   Newer results are available. Click to view them now.    Ref Range & Units 1d ago    - 225 U/L 228     Resulting [facility]      Specimen Collected: 10/10/18 14:58 Last Resulted: 10/10/18             -reactive Protein   Order: [id number]   Status:  Final result   Visible to patient:  No (Not Released)    Ref Range & Units 1d ago   C-Reactive Protein 0.00 - 0.50 mg/dL 1.86     Resulting [facility]      Specimen Collected: 10/10/18 14:58 Last Resulted: 10/10/18 16:15              Sedimentation Rate   Order: [id number]   Status:  Final result   Visible to patient:  No (Not Released)    Ref Range & Units 1d ago   Sed Rate 0 - 20 mm/hr 12    Resulting [facility]      Specimen Collected: 10/10/18 14:58 Last Resulted: 10/10/18 16:19                    Assessment/Plan  In summary, this patient who is 63 has been having progressive amount of shortness of breath since 12/2017 and recent assessment has documented progressive worsening of left ventricular function. The patient has not had any history of coronary disease and has raised the question if the recent so-called viral infection has produced some element of viral myocarditis. He has had a cardiac catheterization this morning. The report is not available in Epic yet. On the other hand, the patient has been found to have a monoclonal protein in a random urine specimen and also minimal monoclonal protein not measurable in serum protein electrophoresis. This raises the question where we are. The patient has hypogammaglobulinemia, normal calcium, normal creatinine. The differential diagnosis of the condition that he could have with these monoclonal protein and if we put together some myocardial dysfunction most likely will point out into amyloidosis. On the other hand, it is impossible to rule out the possibility of multiple myeloma or  lymphoma producing monoclonal proteins. Therefore, under the present circumstances, I discussed with the patient, his wife and his adopted son who is in healthcare the following issues.   1. We will proceed with further assessment of his condition, checking a urine protein quantification of 24 hours for monoclonal protein studies and doing a serum protein electrophoresis, immunofixation and quantification of immunoglobulins.   2. We will do a sedimentation rate and C-reactive protein as well as LDH.   3. We are going to proceed with a skeletal bone survey.   4. We will proceed with a CT scan of the chest, abdomen and pelvis with no IV contrast. REMIND OURSELVES THAT IV CONTRAST IN PATIENTS WITH MONOCLONAL PROTEIN CAN TRIGGER RENAL INSUFFICIENCY. I MADE THE PATIENT AND WIFE AND ADOPTED SON AWARE OF THESE CIRCUMSTANCES TO AVOID CONTRAST AT ANY COST IN THE FUTURE.   I reviewed today his radiological analysis including skeletal bone survey that shows no evidence of osteoporosis, osteopenia or lytic lesions at any level. I also reviewed the CT scan of the chest, abdomen and pelvis.  The CT scan of the chest unequivocally documents that the patient has left upper lobe pneumonia.  The patient has no pleural effusion. He has cardiomegaly. He has normal liver and spleen anatomy, no mediastinal or retroperitoneal adenopathies, normal pancreas, normal kidney anatomy and no masses or alterations in the skeleton.  His LDH, sedimentation rate and C-reactive protein are all into normal limits.  His beta-2 microglobulin is minimally elevated.  His urine collection of 24-hours will be completed tonight. His monoclonal proteins and blood sample were obtained yesterday.  Therefore, from my point of view the likelihood of him having significant monoclonal gammopathy that will lead into diagnosis of myeloma or lymphoma or something of this nature is less likely now with the information that we have available. Nevertheless, I am going to  go ahead and make him an appointment to come back and see me in a couple of weeks in the office, review the monoclonal protein studies and go from there.      Discussed with the patient, his wife and his daughter today all the details in regard to his skeletal bone survey and CT scans from yesterday. Monoclonal protein studies have been completed in regard to collection and they will be reported in a few days.     At this time, I have nothing to add to his care and I have discussed the case as well with Dr. Brown, Pulmonary Medicine, in regard to antibiotic therapy for his pneumonia.     I will review the patient back in the office in a couple of weeks, go through the monoclonal protein studies, review the MRI of the heart that has been done today and make further suggestions. I am not sure that I would proceed with bone marrow testing on this patient under the present circumstances but this will need to be reviewed once that we collected the rest of the information that is pending at this time.                             10/12/2018      CC:

## 2018-10-12 NOTE — PROGRESS NOTES
[facility]  Phone: [phone]  Michel Brown MD    Subjective:  LOS: 2    Mild cough. No significant soa. Denies chest pain. Leg edema improved.    Objective   Vital Signs past 24hrs    Temp range: Temp (24hrs), Av.6 °F (36.4 °C), Min:97.5 °F (36.4 °C), Max:97.6 °F (36.4 °C)    BP range: BP: ()/(62-71) 101/68  Pulse range: Heart Rate:  [70-74] 70  Resp rate range: Resp:  [18] 18    Device (Oxygen Therapy): room air   Oxygen range:SpO2:  [94 %-96 %] 96 %      87 kg (191 lb 12.8 oz); Body mass index is 26.75 kg/m².    Intake/Output Summary (Last 24 hours) at 10/12/18 1025  Last data filed at 10/12/18 0817   Gross per 24 hour   Intake              840 ml   Output             3125 ml   Net            -2285 ml       Physical Exam   Constitutional: He appears well-developed and well-nourished.   Cardiovascular: Normal rate and regular rhythm.    No murmur heard.  Pulmonary/Chest: He has no wheezes. He has no rales.   Mild coarse breath sounds that clear with cough   Abdominal: Soft. Bowel sounds are normal. There is no tenderness.   Musculoskeletal: He exhibits edema (mild now).   Neurological: He is alert.   Nursing note and vitals reviewed.    Results Review:    I have reviewed the laboratory and imaging data since the last note by [facility] physician.  My annotations are noted in assessment and plan.    Medication Review:  I have reviewed the current MAR.  My annotations are noted in assessment and plan.       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    Note findings of CT. Interesting presentation of pneumonia. Sent cultures. Procalcitonin -ve. Note low Ig therefore elect to treat. Give Omnicef. MUST have fu CT in 8-10 weeks -  ordered.    Discussed with Laurel Deleon and Parker.    Patient instructed to fu in my office after above CT chest, or sooner if new respiratory symptoms.    Michel Brown MD  10/12/18  10:25 AM    Part of this note may be an electronic transcription/translation of spoken language to printed text using the Dragon Dictation System.

## 2018-10-12 NOTE — PROGRESS NOTES
"Patient Care Team:  Enmanuel Renae MD as PCP - General (Internal Medicine)  Katelynn Figueroa MD as Consulting Physician (Endocrinology)  Billy Cortez OD as Consulting Physician (Optometry)  Wm Deleon MD as Referring Physician (Cardiology)    Chief Complaint: Follow up acute systolic and diastolic heart failure    Interval History:   Diuresing well.  Hematology does not feel that he has myeloma or amyloid.  No biopsy performed. Looks good today.     Objective   Vital Signs  Temp:  [97.5 °F (36.4 °C)-97.6 °F (36.4 °C)] 97.5 °F (36.4 °C)  Heart Rate:  [70-74] 70  Resp:  [18] 18  BP: ()/(62-71) 101/68    Intake/Output Summary (Last 24 hours) at 10/12/18 0914  Last data filed at 10/12/18 0817   Gross per 24 hour   Intake              840 ml   Output             3125 ml   Net            -2285 ml     Flowsheet Rows      First Filed Value   Admission Height  180.3 cm (71\") Documented at 10/10/2018 1001   Admission Weight  90 kg (198 lb 8 oz) Documented at 10/10/2018 1005          General Appearance:    Alert, cooperative, in no acute distress   Head:    Normocephalic, without obvious abnormality, atraumatic       Neck:   No adenopathy, supple, no thyromegaly, no carotid bruit, no    JVD   Lungs:     Clear to auscultation bilaterally, no wheezes, rales, or     rhonchi    Heart:    Normal rate, regular rhythm,  No murmur, rub, or gallop   Chest Wall:    No abnormalities observed   Abdomen:     Normal bowel sounds, soft, non-tender, non-distended,            no rebound tenderness   Extremities:   No cyanosis, clubbing, or edema   Pulses:   Pulses palpable and equal bilaterally   Skin:   No bleeding or rash               atorvastatin 20 mg Oral Daily   carvedilol 3.125 mg Oral BID   cefdinir 300 mg Oral Q12H   fluticasone 2 spray Nasal Daily   furosemide 80 mg Intravenous Q8H   glipiZIDE 5 mg Oral QAM AC   influenza vaccine 0.5 mL Intramuscular Once   sacubitril-valsartan 1 tablet Oral " Q12H         sodium chloride 75 mL/hr Last Rate: 75 mL/hr (10/10/18 1020)       Results Review:      Results from last 7 days  Lab Units 10/12/18  0357  10/11/18  0336   SODIUM mmol/L  --   --  141   POTASSIUM mmol/L 3.8  < > 3.4*   CHLORIDE mmol/L  --   --  101   CO2 mmol/L  --   --  25.4   BUN mg/dL  --   --  26*   CREATININE mg/dL  --   --  1.03   GLUCOSE mg/dL  --   --  134*   CALCIUM mg/dL  --   --  8.8   < > = values in this interval not displayed.        Results from last 7 days  Lab Units 10/11/18  0336   WBC 10*3/mm3 7.17   HEMOGLOBIN g/dL 12.7*   HEMATOCRIT % 40.8   PLATELETS 10*3/mm3 179                       I reviewed the patient's new clinical results.  I personally viewed and interpreted the patient's EKG/Telemetry data        Assessment/Plan   1.  Acute systolic and diastolic heart failure  2.  Pulmonary hypertension: Severe  3.  Left ventricular hypertrophy: moderate  4.  Essential hypertension  5.  Monoclonal protein: Evaluation ongoing    -Recommend continuing current dose of carvedilol and Entresto  -Continue IV diuresis.  He is tolerating well.  Renal function is stable  -Heme has reviewed his imaging and there are no evidence of lytic lesions.    -24 hour urine is pending  -Cardiac MRI today to evaluate myocardium and give additional info to help r/o infiltrative process.   -Will need repeat RHC when euvolemic. Maybe Monday or Tues

## 2018-10-13 LAB
ANION GAP SERPL CALCULATED.3IONS-SCNC: 12.9 MMOL/L
BACTERIA SPEC RESP CULT: NORMAL
BUN BLD-MCNC: 20 MG/DL (ref 8–23)
BUN/CREAT SERPL: 24.7 (ref 7–25)
CALCIUM SPEC-SCNC: 8.5 MG/DL (ref 8.6–10.5)
CHLORIDE SERPL-SCNC: 99 MMOL/L (ref 98–107)
CO2 SERPL-SCNC: 28.1 MMOL/L (ref 22–29)
CREAT BLD-MCNC: 0.81 MG/DL (ref 0.76–1.27)
GFR SERPL CREATININE-BSD FRML MDRD: 96 ML/MIN/1.73
GLUCOSE BLD-MCNC: 149 MG/DL (ref 65–99)
GLUCOSE BLDC GLUCOMTR-MCNC: 140 MG/DL (ref 70–130)
GLUCOSE BLDC GLUCOMTR-MCNC: 145 MG/DL (ref 70–130)
GLUCOSE BLDC GLUCOMTR-MCNC: 162 MG/DL (ref 70–130)
GLUCOSE BLDC GLUCOMTR-MCNC: 229 MG/DL (ref 70–130)
GRAM STN SPEC: NORMAL
POTASSIUM BLD-SCNC: 3.3 MMOL/L (ref 3.5–5.2)
POTASSIUM BLD-SCNC: 4.2 MMOL/L (ref 3.5–5.2)
SODIUM BLD-SCNC: 140 MMOL/L (ref 136–145)

## 2018-10-13 PROCEDURE — 25010000002 FUROSEMIDE PER 20 MG: Performed by: INTERNAL MEDICINE

## 2018-10-13 PROCEDURE — 63710000001 INSULIN ASPART PER 5 UNITS: Performed by: NURSE PRACTITIONER

## 2018-10-13 PROCEDURE — 82962 GLUCOSE BLOOD TEST: CPT

## 2018-10-13 PROCEDURE — 25010000002 ENOXAPARIN PER 10 MG: Performed by: INTERNAL MEDICINE

## 2018-10-13 PROCEDURE — 80048 BASIC METABOLIC PNL TOTAL CA: CPT | Performed by: INTERNAL MEDICINE

## 2018-10-13 PROCEDURE — 99232 SBSQ HOSP IP/OBS MODERATE 35: CPT | Performed by: NURSE PRACTITIONER

## 2018-10-13 PROCEDURE — 25010000002 FUROSEMIDE PER 20 MG: Performed by: NURSE PRACTITIONER

## 2018-10-13 PROCEDURE — 84132 ASSAY OF SERUM POTASSIUM: CPT | Performed by: INTERNAL MEDICINE

## 2018-10-13 RX ADMIN — ENOXAPARIN SODIUM 40 MG: 40 INJECTION SUBCUTANEOUS at 11:37

## 2018-10-13 RX ADMIN — INSULIN ASPART 2 UNITS: 100 INJECTION, SOLUTION INTRAVENOUS; SUBCUTANEOUS at 16:53

## 2018-10-13 RX ADMIN — SACUBITRIL AND VALSARTAN 1 TABLET: 24; 26 TABLET, FILM COATED ORAL at 20:36

## 2018-10-13 RX ADMIN — FUROSEMIDE 80 MG: 10 INJECTION, SOLUTION INTRAMUSCULAR; INTRAVENOUS at 00:48

## 2018-10-13 RX ADMIN — CARVEDILOL 3.12 MG: 6.25 TABLET, FILM COATED ORAL at 20:35

## 2018-10-13 RX ADMIN — POTASSIUM CHLORIDE 40 MEQ: 750 CAPSULE, EXTENDED RELEASE ORAL at 13:09

## 2018-10-13 RX ADMIN — CEFDINIR 300 MG: 300 CAPSULE ORAL at 09:40

## 2018-10-13 RX ADMIN — FUROSEMIDE 80 MG: 10 INJECTION, SOLUTION INTRAMUSCULAR; INTRAVENOUS at 13:10

## 2018-10-13 RX ADMIN — SACUBITRIL AND VALSARTAN 1 TABLET: 24; 26 TABLET, FILM COATED ORAL at 13:09

## 2018-10-13 RX ADMIN — CARVEDILOL 3.12 MG: 6.25 TABLET, FILM COATED ORAL at 13:09

## 2018-10-13 RX ADMIN — FUROSEMIDE 80 MG: 10 INJECTION, SOLUTION INTRAMUSCULAR; INTRAVENOUS at 20:34

## 2018-10-13 RX ADMIN — CARVEDILOL 3.12 MG: 6.25 TABLET, FILM COATED ORAL at 22:44

## 2018-10-13 RX ADMIN — GLIPIZIDE 5 MG: 5 TABLET ORAL at 09:40

## 2018-10-13 RX ADMIN — CEFDINIR 300 MG: 300 CAPSULE ORAL at 20:34

## 2018-10-13 RX ADMIN — ATORVASTATIN CALCIUM 20 MG: 20 TABLET, FILM COATED ORAL at 09:40

## 2018-10-13 RX ADMIN — INSULIN ASPART 3 UNITS: 100 INJECTION, SOLUTION INTRAVENOUS; SUBCUTANEOUS at 11:41

## 2018-10-13 RX ADMIN — POTASSIUM CHLORIDE 40 MEQ: 750 CAPSULE, EXTENDED RELEASE ORAL at 06:33

## 2018-10-13 NOTE — PROGRESS NOTES
[facility]  Phone: [phone]  Ashley Fernández MD    Subjective:  LOS: 3    Feels better this morning.  Sitting up in a chair.  No issues reported.    Objective   Vital Signs past 24hrs    Temp range: Temp (24hrs), Av °F (36.7 °C), Min:97.9 °F (36.6 °C), Max:98 °F (36.7 °C)    BP range: BP: ()/(55-69) 89/59  Pulse range: Heart Rate:  [71-79] 74  Resp rate range: Resp:  [16-20] 18    Device (Oxygen Therapy): room air   Oxygen range:       85.9 kg (189 lb 7 oz); Body mass index is 26.42 kg/m².    Intake/Output Summary (Last 24 hours) at 10/13/18 1635  Last data filed at 10/13/18 1313   Gross per 24 hour   Intake              800 ml   Output             2500 ml   Net            -1700 ml       Physical Exam   Constitutional: He appears well-developed and well-nourished.   Cardiovascular: Normal rate and regular rhythm.    No murmur heard.  Pulmonary/Chest: He has no wheezes. He has no rales.   Mild coarse breath sounds that clear with cough   Abdominal: Soft. Bowel sounds are normal. There is no tenderness.   Musculoskeletal: He exhibits edema (mild now).   Neurological: He is alert.   Nursing note and vitals reviewed.    Results Review:    I have reviewed the laboratory and imaging data since the last note by [facility] physician.  My annotations are noted in assessment and plan.    Medication Review:  I have reviewed the current MAR.  My annotations are noted in assessment and plan.       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    Note findings of CT. Interesting presentation of pneumonia. Sent cultures. Procalcitonin -ve. Note low Ig therefore elect to treat. Give Omnicef. MUST have fu CT in 8-10 weeks      Patient instructed to follow-up with  Dr. Salazar in the office office after above CT chest, or sooner if new respiratory symptoms.    Ashley Fernández MD  10/13/18  4:35 PM    Part of this note may be an electronic transcription/translation of spoken language to printed text using the Dragon Dictation System.

## 2018-10-13 NOTE — PLAN OF CARE
Problem: Patient Care Overview  Goal: Plan of Care Review  Outcome: Ongoing (interventions implemented as appropriate)   10/13/18 5413   Coping/Psychosocial   Plan of Care Reviewed With patient   Plan of Care Review   Progress improving   OTHER   Outcome Summary VSS. BP low 90s-low 100s. Asypmtomatic. Carvedilol held per MD. IV Lasix given. Denies pain. Will continue to monitor.

## 2018-10-13 NOTE — PLAN OF CARE
Problem: Patient Care Overview  Goal: Plan of Care Review  Outcome: Ongoing (interventions implemented as appropriate)   10/13/18 3791   Coping/Psychosocial   Plan of Care Reviewed With patient;spouse   Plan of Care Review   Progress improving   OTHER   Outcome Summary Denies chest pain. RLL and FAUSTINO with rhonchi/expiratory wheezing this morning, clear this afternoon. Incentive spirometer encouraged - 5x/hour. Bandaids to right radial and right brachial cath sites soft and dry. Pedal edema . Low BP noted - APRN notified. Asymptomatic. IV lasix. NSR.. Continue to monitor     Goal: Individualization and Mutuality  Outcome: Ongoing (interventions implemented as appropriate)    Goal: Discharge Needs Assessment  Outcome: Ongoing (interventions implemented as appropriate)

## 2018-10-14 LAB
ANION GAP SERPL CALCULATED.3IONS-SCNC: 14 MMOL/L
BUN BLD-MCNC: 21 MG/DL (ref 8–23)
BUN/CREAT SERPL: 21.9 (ref 7–25)
CALCIUM SPEC-SCNC: 8.7 MG/DL (ref 8.6–10.5)
CHLORIDE SERPL-SCNC: 99 MMOL/L (ref 98–107)
CO2 SERPL-SCNC: 27 MMOL/L (ref 22–29)
CREAT BLD-MCNC: 0.96 MG/DL (ref 0.76–1.27)
GFR SERPL CREATININE-BSD FRML MDRD: 79 ML/MIN/1.73
GLUCOSE BLD-MCNC: 237 MG/DL (ref 65–99)
GLUCOSE BLDC GLUCOMTR-MCNC: 157 MG/DL (ref 70–130)
GLUCOSE BLDC GLUCOMTR-MCNC: 158 MG/DL (ref 70–130)
GLUCOSE BLDC GLUCOMTR-MCNC: 178 MG/DL (ref 70–130)
GLUCOSE BLDC GLUCOMTR-MCNC: 189 MG/DL (ref 70–130)
MAGNESIUM SERPL-MCNC: 2.2 MG/DL (ref 1.6–2.4)
POTASSIUM BLD-SCNC: 3.9 MMOL/L (ref 3.5–5.2)
SODIUM BLD-SCNC: 140 MMOL/L (ref 136–145)

## 2018-10-14 PROCEDURE — 25010000002 INFLUENZA VAC SUBUNIT QUAD 0.5 ML SUSPENSION PREFILLED SYRINGE: Performed by: INTERNAL MEDICINE

## 2018-10-14 PROCEDURE — 25010000002 ENOXAPARIN PER 10 MG: Performed by: INTERNAL MEDICINE

## 2018-10-14 PROCEDURE — 83735 ASSAY OF MAGNESIUM: CPT | Performed by: NURSE PRACTITIONER

## 2018-10-14 PROCEDURE — 90661 CCIIV3 VAC ABX FR 0.5 ML IM: CPT | Performed by: INTERNAL MEDICINE

## 2018-10-14 PROCEDURE — 82962 GLUCOSE BLOOD TEST: CPT

## 2018-10-14 PROCEDURE — G0008 ADMIN INFLUENZA VIRUS VAC: HCPCS | Performed by: INTERNAL MEDICINE

## 2018-10-14 PROCEDURE — 63710000001 INSULIN ASPART PER 5 UNITS: Performed by: NURSE PRACTITIONER

## 2018-10-14 PROCEDURE — 25010000002 FUROSEMIDE PER 20 MG: Performed by: NURSE PRACTITIONER

## 2018-10-14 PROCEDURE — 80048 BASIC METABOLIC PNL TOTAL CA: CPT | Performed by: INTERNAL MEDICINE

## 2018-10-14 PROCEDURE — 99231 SBSQ HOSP IP/OBS SF/LOW 25: CPT | Performed by: NURSE PRACTITIONER

## 2018-10-14 RX ADMIN — FUROSEMIDE 80 MG: 10 INJECTION, SOLUTION INTRAMUSCULAR; INTRAVENOUS at 23:48

## 2018-10-14 RX ADMIN — ATORVASTATIN CALCIUM 20 MG: 20 TABLET, FILM COATED ORAL at 08:32

## 2018-10-14 RX ADMIN — INSULIN ASPART 2 UNITS: 100 INJECTION, SOLUTION INTRAVENOUS; SUBCUTANEOUS at 21:50

## 2018-10-14 RX ADMIN — INFLUENZA A VIRUS A/SINGAPORE/GP1908/2015 IVR-180 (H1N1) ANTIGEN (MDCK CELL DERIVED, PROPIOLACTONE INACTIVATED), INFLUENZA A VIRUS A/NORTH CAROLINA/04/2016 (H3N2) HEMAGGLUTININ ANTIGEN (MDCK CELL DERIVED, PROPIOLACTONE INACTIVATED), INFLUENZA B VIRUS B/IOWA/06/2017 HEMAGGLUTININ ANTIGEN (MDCK CELL DERIVED, PROPIOLACTONE INACTIVATED), INFLUENZA B VIRUS B/SINGAPORE/INFTT-16-0610/2016 HEMAGGLUTININ ANTIGEN (MDCK CELL DERIVED, PROPIOLACTONE INACTIVATED) 0.5 ML: 15; 15; 15; 15 INJECTION, SUSPENSION INTRAMUSCULAR at 11:23

## 2018-10-14 RX ADMIN — SACUBITRIL AND VALSARTAN 1 TABLET: 24; 26 TABLET, FILM COATED ORAL at 08:32

## 2018-10-14 RX ADMIN — ENOXAPARIN SODIUM 40 MG: 40 INJECTION SUBCUTANEOUS at 11:16

## 2018-10-14 RX ADMIN — FUROSEMIDE 80 MG: 10 INJECTION, SOLUTION INTRAMUSCULAR; INTRAVENOUS at 19:47

## 2018-10-14 RX ADMIN — GLIPIZIDE 5 MG: 5 TABLET ORAL at 08:32

## 2018-10-14 RX ADMIN — FUROSEMIDE 80 MG: 10 INJECTION, SOLUTION INTRAMUSCULAR; INTRAVENOUS at 11:17

## 2018-10-14 RX ADMIN — CARVEDILOL 3.12 MG: 6.25 TABLET, FILM COATED ORAL at 08:32

## 2018-10-14 RX ADMIN — CEFDINIR 300 MG: 300 CAPSULE ORAL at 21:39

## 2018-10-14 RX ADMIN — CEFDINIR 300 MG: 300 CAPSULE ORAL at 08:32

## 2018-10-14 RX ADMIN — INSULIN ASPART 2 UNITS: 100 INJECTION, SOLUTION INTRAVENOUS; SUBCUTANEOUS at 06:50

## 2018-10-14 RX ADMIN — FUROSEMIDE 80 MG: 10 INJECTION, SOLUTION INTRAMUSCULAR; INTRAVENOUS at 04:57

## 2018-10-14 RX ADMIN — INSULIN ASPART 2 UNITS: 100 INJECTION, SOLUTION INTRAVENOUS; SUBCUTANEOUS at 16:38

## 2018-10-14 RX ADMIN — CARVEDILOL 3.12 MG: 6.25 TABLET, FILM COATED ORAL at 21:39

## 2018-10-14 RX ADMIN — SACUBITRIL AND VALSARTAN 1 TABLET: 24; 26 TABLET, FILM COATED ORAL at 21:39

## 2018-10-14 RX ADMIN — INSULIN ASPART 2 UNITS: 100 INJECTION, SOLUTION INTRAVENOUS; SUBCUTANEOUS at 11:22

## 2018-10-14 NOTE — PLAN OF CARE
Problem: Patient Care Overview  Goal: Plan of Care Review  Outcome: Ongoing (interventions implemented as appropriate)   10/14/18 1727   Coping/Psychosocial   Plan of Care Reviewed With patient   Plan of Care Review   Progress improving   OTHER   Outcome Summary No c/o chest pain or SOA. AMbulating in hallway without difficulty. Mild pedal edema. Cath sites soft and dry. Lungs clear. NSR. Continue to monitor.      Goal: Individualization and Mutuality  Outcome: Ongoing (interventions implemented as appropriate)    Goal: Discharge Needs Assessment  Outcome: Ongoing (interventions implemented as appropriate)      Problem: Cardiac Catheterization (Diagnostic/Interventional) (Adult)  Goal: Signs and Symptoms of Listed Potential Problems Will be Absent, Minimized or Managed (Cardiac Catheterization)  Outcome: Ongoing (interventions implemented as appropriate)   10/14/18 1727   Goal/Outcome Evaluation   Problems Assessed (Cardiac Catheterization) all

## 2018-10-14 NOTE — PLAN OF CARE
Problem: Patient Care Overview  Goal: Plan of Care Review   10/14/18 0309   Coping/Psychosocial   Plan of Care Reviewed With patient   Plan of Care Review   Progress improving   OTHER   Outcome Summary VSS. Denies pain. IV Lasix given, pt. voiding adequately. Lungs diminished with rhonchi. Tolerating BP. Will continue to monitor.

## 2018-10-14 NOTE — PROGRESS NOTES
[facility]  Phone: [phone]  Ashley Fernández MD    Subjective:  LOS: 4    Feels better this morning. No issues reported.    Objective   Vital Signs past 24hrs    Temp range: Temp (24hrs), Av.9 °F (36.6 °C), Min:97.7 °F (36.5 °C), Max:98 °F (36.7 °C)    BP range: BP: ()/(53-73) 98/60  Pulse range: Heart Rate:  [71-79] 75  Resp rate range: Resp:  [16-18] 18    Device (Oxygen Therapy): room air   Oxygen range:       87.8 kg (193 lb 9.6 oz); Body mass index is 27 kg/m².    Intake/Output Summary (Last 24 hours) at 10/14/18 1143  Last data filed at 10/14/18 1045   Gross per 24 hour   Intake             1320 ml   Output             2850 ml   Net            -1530 ml       Physical Exam   Constitutional: He appears well-developed and well-nourished.   Cardiovascular: Normal rate and regular rhythm.    No murmur heard.  Pulmonary/Chest: He has no wheezes. He has no rales.   Mild coarse breath sounds that clear with cough   Abdominal: Soft. Bowel sounds are normal. There is no tenderness.   Musculoskeletal: He exhibits edema (mild now).   Neurological: He is alert.   Nursing note and vitals reviewed.    Results Review:    I have reviewed the laboratory and imaging data since the last note by [facility] physician.  My annotations are noted in assessment and plan.    Medication Review:  I have reviewed the current MAR.  My annotations are noted in assessment and plan.       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    Note findings of CT.  Sent cultures. Procalcitonin -ve. Note low Ig therefore elect to treat.  Complete Omnicef for 7 days.  MUST have fu CT in 8-10 weeks      Patient instructed to follow-up with Dr. Salazar in the office office after above CT  chest, or sooner if new respiratory symptoms.    Ashley Fernández MD  10/14/18  11:43 AM    Part of this note may be an electronic transcription/translation of spoken language to printed text using the Dragon Dictation System.

## 2018-10-14 NOTE — PROGRESS NOTES
"Patient Care Team:  Enmanuel Renae MD as PCP - General (Internal Medicine)  Katelynn Figueroa MD as Consulting Physician (Endocrinology)  Billy Cortez OD as Consulting Physician (Optometry)  Wm Deleon MD as Referring Physician (Cardiology)    Chief Complaint: Follow up acute systolic and diastolic heart failure    Interval History:   Diuresing well. Some lows on SBP upper 90's.  Not as low as yesterday.  Denies syncope or near syncope. Denies any chest pain , pnd, orthopnea, or SHOB. + Dry Cough noted.  MRI 10/12 showing no previous MI or infiltrative process.  EF 44% on MRI.  Blood sugar up this am 237.  Had some outside food last night, grilled hamburger.     Objective   Vital Signs  Temp:  [97.7 °F (36.5 °C)] 97.7 °F (36.5 °C)  Heart Rate:  [72-79] 75  Resp:  [18] 18  BP: ()/(53-73) 98/60    Intake/Output Summary (Last 24 hours) at 10/14/18 1330  Last data filed at 10/14/18 1045   Gross per 24 hour   Intake              940 ml   Output             2250 ml   Net            -1310 ml     10/14/18 0656  87.8 kg (193 lb 9.6 oz)   10/13/18 0500  85.9 kg (189 lb 7 oz)   10/12/18 0500  87 kg (191 lb 12.8 oz)   10/11/18 0500  88 kg (194 lb)   10/10/18 1005  90 kg (198 lb 8 oz)         Flowsheet Rows      First Filed Value   Admission Height  180.3 cm (71\") Documented at 10/10/2018 1001   Admission Weight  90 kg (198 lb 8 oz) Documented at 10/10/2018 1005          General Appearance:    Alert, cooperative, in no acute distress   Head:    Normocephalic, without obvious abnormality, atraumatic       Neck:   No adenopathy, supple, no thyromegaly, no carotid bruit, no    JVD; Neg HJR   Lungs:     Clear to auscultation bilaterally, no wheezes, rales, or     rhonchi    Heart:    Normal rate, regular rhythm,  No murmur, rub;    Chest Wall:    No abnormalities observed   Abdomen:     Normal bowel sounds, soft, non-tender, non-distended,            no rebound tenderness   Extremities:   No " cyanosis, clubbing, or edema   Pulses:   Pulses palpable and equal bilaterally   Skin:   No bleeding or rash               atorvastatin 20 mg Oral Daily   carvedilol 3.125 mg Oral BID   cefdinir 300 mg Oral Q12H   enoxaparin 40 mg Subcutaneous Q24H   fluticasone 2 spray Nasal Daily   furosemide 80 mg Intravenous Q8H   glipiZIDE 5 mg Oral QAM AC   insulin aspart 0-7 Units Subcutaneous 4x Daily With Meals & Nightly   sacubitril-valsartan 1 tablet Oral Q12H            Results Review:      Results from last 7 days  Lab Units 10/14/18  0402   SODIUM mmol/L 140   POTASSIUM mmol/L 3.9   CHLORIDE mmol/L 99   CO2 mmol/L 27.0   BUN mg/dL 21   CREATININE mg/dL 0.96   GLUCOSE mg/dL 237*   CALCIUM mg/dL 8.7           Results from last 7 days  Lab Units 10/11/18  0336   WBC 10*3/mm3 7.17   HEMOGLOBIN g/dL 12.7*   HEMATOCRIT % 40.8   PLATELETS 10*3/mm3 179               Results from last 7 days  Lab Units 10/14/18  0402   MAGNESIUM mg/dL 2.2           Study Result     Cardiac MRI  10/12/18      The pulmonic valve is normal in structure and function.  The tricuspid valve is normal in structure and function.  The mitral valve is normal in structure and function.  The aortic valve is normal in structure and function.     There is moderate left ventricular hypertrophy with mildly decreased left ventricular systolic function.  Ejection fraction calculated at 44%.  The right ventricle is normal in size.  The right ventricular free wall is mildly thickened with mildly decreased systolic function.  The left and right atria are normal in size.   There is a trivial pericardial effusion.  The aortic root is normal in size.      There is normal perfusion of the myocardium.     The coronary arteries not well visualized.     Left ventricular end-diastolic diameter 4.7cm.   Left ventricular end-systolic diameter 3.3cm.  Left ventricular end-diastolic volume 162mL.  Left ventricular end-systolic volume 92mL.  Left Ventricular Ejection fraction  44%.  Left VentricularStroke volume 72mL.  Anterior septal wall thickness 1.8cm.  Posterior wall thickness 1.3cm.     Right ventricular end-diastolic volume 189 mL.  Right ventricular end-systolic volume 117 mL.  Right ventricular stroke volume 71mL.  Right ventricular ejection fraction 38%.         Conclusion:  1.  There is moderate left ventricular hypertrophy.  2.  There is mildly decreased left ventricular systolic function with an ejection fraction of 44%.  3.  There is normal right ventricular size.  4.  There is mild left ventricular decrease in systolic function.  5.  There is normal perfusion of the myocardium  6.  There was suboptimal nulling of the myocardium but with diffuse late gadolinium enhancement.        Rachel Beatty MD  10/12/18       Conclusion     Interventionalist: Wm Deleon M.D., F.A.C.C.     Procedure performed:  1.  Coronary angiography  2.  Left heart catheterization  3.  Left ventricular angiography  4.  Right heart catheterization     Indication:  Acute systolic heart failure     Referring: Pancho         Procedure findings:  Left main: Large-caliber vessel that bifurcates to an LAD and circumflex.  Normal  LAD: Medium caliber vessel that gives rise to a small caliber diagonal branch.  Normal  LCX: Medium caliber vessel that gives rise to a medium caliber OM1 and OM 2.  Normal  RCA: Large caliber dominant vessel that gives rise to a medium caliber PDA and small caliber RPL.  Normal     Left ventricular angiography: Normal left ventricular size.  Mildly reduced left ventricular systolic function.  Ejection fraction 40%.  Inferior wall hypokinesis     Hemodynamics:   LV: 103/32  AO: 103/69     RA: 23/24/20  RV: 94/18  PA: 92/40/55  Wedge: 40/50/34     Saturations:   SVC 58%  PA 58%  AO 94%     Estimated blood loss: Minimal     Complications: None     Conclusions:   1.  Normal coronary angiography  2.  Severely elevated right and left-sided filling pressure  3.  Severe  pulmonary hypertension        Recommendations: I'm actually recommending we admit this gentleman for IV diuresis, titration of heart failure medications, hematology and pulmonary consultation.           Interpretation Summary echo 9/19/18    · Calculated EF = 42%. Estimated EF was in agreement with the calculated EF. Estimated EF = 42%. Global Longitudinal LV strain = -10.7%. Strain data was reviewed and speckle tracking was considered accurate. The global longitudinal strain is markedly abnormal.  · Normal left ventricular cavity size noted. There is left ventricular global hypokinesis noted. Left ventricular wall thickness is consistent with moderate concentric hypertrophy. The findings are consistent with infiltrative cardiomyopathy. There is an echo bright appearance to the left and right ventricular myocardium suspicious for amyloid heart disease. . Left ventricular diastolic function was indeterminate. Elevated left atrial pressure.  · Normal right ventricular cavity size noted. Right ventricular wall thickness is consistent with moderate hypertrophy. Borderline low right ventricular systolic function noted.  · Left atrial volume is mildly increased.  · Right atrial cavity size is mildly dilated  · The valve was poorly visualized but appears trileaflet. The aortic valve is abnormal in structure. There is mild thickening of the aortic valve.  · Mild-to-moderate mitral valve regurgitation is present  · Mild-to-moderate mitral valve regurgitation is present.  · Mild to moderate tricuspid valve regurgitation is present. Estimated right ventricular systolic pressure from tricuspid regurgitation is markedly elevated (>55 mmHg). Calculated right ventricular systolic pressure from tricuspid regurgitation is 67 mmHg.  · There is a trivial pericardial effusion.       10/11/18 EKG        10/11/18      Tele:     Estimated Creatinine Clearance: 97.8 mL/min (by C-G formula based on SCr of 0.96 mg/dL).      I reviewed the  patient's new clinical results.  I personally viewed and interpreted the patient's EKG/Telemetry data        Assessment/Plan   1.  Acute systolic and diastolic heart failure: EF 44%.  Is on BB and Entresto  2.  Pulmonary hypertension: Severe  3.  Left ventricular hypertrophy: moderate  4.  Essential hypertension: controlled some lows, monitor.   5.  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  Pending 24 hour urine studies.  To be f/u on as oupt.    6.  Hypokalemia: resolved.     -Recommend continuing current dose of carvedilol and Entresto with hold parameters  -Continue IV diuresis.  He is tolerating well.  Renal function is stable.  Some SBP upper 90's but asymptomatic.  Hold parameters on his entresto and coreg in order to continue diuresing in preparation for RHC. Creatine stable.   -Heme has reviewed his imaging and there are no evidence of lytic lesions and are recommending f/u in their office to review monoclonal proteins studies.    -24 hour urine is pending  -Cardiac MRI 10/12/18 : reviewed with Dr. Hansen.  No evidence of myocardial scar or infiltrative process.     -Plan repeat RHC when euvolemic. Possibly Monday or Tues per Dr. Deleon.  Will make NPO after MN tonight just in case Dr. Deleon feels he has diuresed enough.        ERIN Ferguson  Weekend cardiology coverage  10/13/18 1257 pm

## 2018-10-15 LAB
ANION GAP SERPL CALCULATED.3IONS-SCNC: 13 MMOL/L
BUN BLD-MCNC: 22 MG/DL (ref 8–23)
BUN/CREAT SERPL: 23.7 (ref 7–25)
CALCIUM SPEC-SCNC: 8.8 MG/DL (ref 8.6–10.5)
CHLORIDE SERPL-SCNC: 99 MMOL/L (ref 98–107)
CO2 SERPL-SCNC: 27 MMOL/L (ref 22–29)
CREAT BLD-MCNC: 0.93 MG/DL (ref 0.76–1.27)
GFR SERPL CREATININE-BSD FRML MDRD: 82 ML/MIN/1.73
GLUCOSE BLD-MCNC: 163 MG/DL (ref 65–99)
GLUCOSE BLDC GLUCOMTR-MCNC: 153 MG/DL (ref 70–130)
GLUCOSE BLDC GLUCOMTR-MCNC: 213 MG/DL (ref 70–130)
GLUCOSE BLDC GLUCOMTR-MCNC: 257 MG/DL (ref 70–130)
HCT VFR BLDA CALC: 42 % (ref 38–51)
HCT VFR BLDA CALC: 42 % (ref 38–51)
HCT VFR BLDA CALC: 43 % (ref 38–51)
HCT VFR BLDA CALC: 44 % (ref 38–51)
HGB BLDA-MCNC: 14.3 G/DL (ref 12–17)
HGB BLDA-MCNC: 14.3 G/DL (ref 12–17)
HGB BLDA-MCNC: 14.6 G/DL (ref 12–17)
HGB BLDA-MCNC: 15 G/DL (ref 12–17)
POTASSIUM BLD-SCNC: 3.8 MMOL/L (ref 3.5–5.2)
SAO2 % BLDA: 66 % (ref 95–98)
SAO2 % BLDA: 66 % (ref 95–98)
SAO2 % BLDA: 67 % (ref 95–98)
SAO2 % BLDA: 68 % (ref 95–98)
SAO2 % BLDA: 68 % (ref 95–98)
SAO2 % BLDA: 69 % (ref 95–98)
SAO2 % BLDA: 70 % (ref 95–98)
SAO2 % BLDA: 73 % (ref 95–98)
SAO2 % BLDA: 73 % (ref 95–98)
SAO2 % BLDA: 74 % (ref 95–98)
SODIUM BLD-SCNC: 139 MMOL/L (ref 136–145)

## 2018-10-15 PROCEDURE — 4A023N6 MEASUREMENT OF CARDIAC SAMPLING AND PRESSURE, RIGHT HEART, PERCUTANEOUS APPROACH: ICD-10-PCS | Performed by: INTERNAL MEDICINE

## 2018-10-15 PROCEDURE — C1894 INTRO/SHEATH, NON-LASER: HCPCS | Performed by: INTERNAL MEDICINE

## 2018-10-15 PROCEDURE — 93463 DRUG ADMIN & HEMODYNMIC MEAS: CPT | Performed by: INTERNAL MEDICINE

## 2018-10-15 PROCEDURE — 85018 HEMOGLOBIN: CPT

## 2018-10-15 PROCEDURE — 85014 HEMATOCRIT: CPT

## 2018-10-15 PROCEDURE — 25010000002 FUROSEMIDE PER 20 MG: Performed by: NURSE PRACTITIONER

## 2018-10-15 PROCEDURE — 63710000001 INSULIN ASPART PER 5 UNITS: Performed by: NURSE PRACTITIONER

## 2018-10-15 PROCEDURE — 99232 SBSQ HOSP IP/OBS MODERATE 35: CPT | Performed by: INTERNAL MEDICINE

## 2018-10-15 PROCEDURE — 80048 BASIC METABOLIC PNL TOTAL CA: CPT | Performed by: INTERNAL MEDICINE

## 2018-10-15 PROCEDURE — 25010000002 BH (CUPID ONLY) ADENOSINE CHARGE TOTAL VOLUME: Performed by: INTERNAL MEDICINE

## 2018-10-15 PROCEDURE — 82962 GLUCOSE BLOOD TEST: CPT

## 2018-10-15 PROCEDURE — 93451 RIGHT HEART CATH: CPT | Performed by: INTERNAL MEDICINE

## 2018-10-15 PROCEDURE — C1769 GUIDE WIRE: HCPCS | Performed by: INTERNAL MEDICINE

## 2018-10-15 RX ORDER — METOLAZONE 5 MG/1
5 TABLET ORAL ONCE
Status: COMPLETED | OUTPATIENT
Start: 2018-10-15 | End: 2018-10-15

## 2018-10-15 RX ORDER — SODIUM CHLORIDE 9 MG/ML
INJECTION, SOLUTION INTRAVENOUS CONTINUOUS PRN
Status: COMPLETED | OUTPATIENT
Start: 2018-10-15 | End: 2018-10-15

## 2018-10-15 RX ORDER — LIDOCAINE HYDROCHLORIDE 20 MG/ML
INJECTION, SOLUTION INFILTRATION; PERINEURAL AS NEEDED
Status: DISCONTINUED | OUTPATIENT
Start: 2018-10-15 | End: 2018-10-15 | Stop reason: HOSPADM

## 2018-10-15 RX ORDER — BUMETANIDE 0.25 MG/ML
2 INJECTION INTRAMUSCULAR; INTRAVENOUS EVERY 8 HOURS
Status: DISCONTINUED | OUTPATIENT
Start: 2018-10-15 | End: 2018-10-17

## 2018-10-15 RX ADMIN — SACUBITRIL AND VALSARTAN 1 TABLET: 24; 26 TABLET, FILM COATED ORAL at 08:45

## 2018-10-15 RX ADMIN — METOLAZONE 5 MG: 5 TABLET ORAL at 16:33

## 2018-10-15 RX ADMIN — INSULIN ASPART 3 UNITS: 100 INJECTION, SOLUTION INTRAVENOUS; SUBCUTANEOUS at 22:19

## 2018-10-15 RX ADMIN — SACUBITRIL AND VALSARTAN 1 TABLET: 24; 26 TABLET, FILM COATED ORAL at 21:19

## 2018-10-15 RX ADMIN — BUMETANIDE 2 MG: 0.25 INJECTION INTRAMUSCULAR; INTRAVENOUS at 22:19

## 2018-10-15 RX ADMIN — BUMETANIDE 2 MG: 0.25 INJECTION INTRAMUSCULAR; INTRAVENOUS at 16:32

## 2018-10-15 RX ADMIN — FUROSEMIDE 80 MG: 10 INJECTION, SOLUTION INTRAMUSCULAR; INTRAVENOUS at 08:45

## 2018-10-15 RX ADMIN — ATORVASTATIN CALCIUM 20 MG: 20 TABLET, FILM COATED ORAL at 08:45

## 2018-10-15 RX ADMIN — CARVEDILOL 3.12 MG: 6.25 TABLET, FILM COATED ORAL at 08:45

## 2018-10-15 RX ADMIN — CEFDINIR 300 MG: 300 CAPSULE ORAL at 08:45

## 2018-10-15 RX ADMIN — CEFDINIR 300 MG: 300 CAPSULE ORAL at 22:19

## 2018-10-15 RX ADMIN — INSULIN ASPART 3 UNITS: 100 INJECTION, SOLUTION INTRAVENOUS; SUBCUTANEOUS at 17:14

## 2018-10-15 RX ADMIN — GLIPIZIDE 5 MG: 5 TABLET ORAL at 08:45

## 2018-10-15 NOTE — PROGRESS NOTES
"Patient Care Team:  Enmanuel Renae MD as PCP - General (Internal Medicine)  Katelynn Figueroa MD as Consulting Physician (Endocrinology)  Billy Cortez OD as Consulting Physician (Optometry)  Wm Deleon MD as Referring Physician (Cardiology)    Chief Complaint: Follow-up acute combined systolic and diastolic heart failure    Interval History: Patient continues to diuresis very well.  His BUN and creatinine are stable.  He states that he feels well.      Objective   Vital Signs  Temp:  [97.6 °F (36.4 °C)-98 °F (36.7 °C)] 98 °F (36.7 °C)  Heart Rate:  [70-80] 72  Resp:  [16] 16  BP: ()/(57-78) 100/74    Intake/Output Summary (Last 24 hours) at 10/15/18 0925  Last data filed at 10/15/18 0837   Gross per 24 hour   Intake                0 ml   Output             3550 ml   Net            -3550 ml     Flowsheet Rows      First Filed Value   Admission Height  180.3 cm (71\") Documented at 10/10/2018 1001   Admission Weight  90 kg (198 lb 8 oz) Documented at 10/10/2018 1005          General Appearance:    Alert, cooperative, in no acute distress   Head:    Normocephalic, without obvious abnormality, atraumatic       Neck:   No adenopathy, supple, no thyromegaly, no carotid bruit, no    JVD   Lungs:     Clear to auscultation bilaterally, no wheezes, rales, or     rhonchi    Heart:    Normal rate, regular rhythm,  No murmur, rub, or gallop   Chest Wall:    No abnormalities observed   Abdomen:     Normal bowel sounds, soft, non-tender, non-distended,            no rebound tenderness   Extremities:   No cyanosis, clubbing, or edema   Pulses:   Pulses palpable and equal bilaterally   Skin:   No bleeding or rash               atorvastatin 20 mg Oral Daily   carvedilol 3.125 mg Oral BID   cefdinir 300 mg Oral Q12H   enoxaparin 40 mg Subcutaneous Q24H   fluticasone 2 spray Nasal Daily   furosemide 80 mg Intravenous Q8H   glipiZIDE 5 mg Oral QAM AC   insulin aspart 0-7 Units Subcutaneous 4x Daily " With Meals & Nightly   sacubitril-valsartan 1 tablet Oral Q12H            Results Review:      Results from last 7 days  Lab Units 10/15/18  0419   SODIUM mmol/L 139   POTASSIUM mmol/L 3.8   CHLORIDE mmol/L 99   CO2 mmol/L 27.0   BUN mg/dL 22   CREATININE mg/dL 0.93   GLUCOSE mg/dL 163*   CALCIUM mg/dL 8.8           Results from last 7 days  Lab Units 10/11/18  0336   WBC 10*3/mm3 7.17   HEMOGLOBIN g/dL 12.7*   HEMATOCRIT % 40.8   PLATELETS 10*3/mm3 179               Results from last 7 days  Lab Units 10/14/18  0402   MAGNESIUM mg/dL 2.2         @LABRCNT(bnp)@  I reviewed the patient's new clinical results.  I personally viewed and interpreted the patient's EKG/Telemetry data        Assessment/Plan   1 Acute systolic and diastolic heart failure: Ejection fraction 44%  2. Moderate concentric LVH: No significant change from prior  3.  Severe pulmonary hypertension  4.  Essential hypertension: Controlled  5.  Monoclonal gammopathy: Workup per hematology.    -Cardiac MRI with no evidence of atrial enlargement or significant infiltration of the myocardium.  Some issues with nulling that can be seen with amyloid, however, the remainder of the cardiac MRI is not consistent with that.  -Patient will get a second opinion from hematology in the [place] system.  That is reasonable  -Continue IV diuresis.  BUN, creatinine, and bicarbonate are stable  -Recommend repeat right heart catheterization today to assess volume status and pulmonary pressures in a euvolemic state.  If patient still has severe pulmonary hypertension in the absence of hypervolemia I would recommend vasodilator study.

## 2018-10-15 NOTE — PLAN OF CARE
Problem: Patient Care Overview  Goal: Plan of Care Review  Outcome: Ongoing (interventions implemented as appropriate)   10/15/18 0220   Coping/Psychosocial   Plan of Care Reviewed With patient   Plan of Care Review   Progress improving   OTHER   Outcome Summary no c/o. VSS. R bracial cath today, soft, clean, dry. Switched from IV lasix to IV Bumex. Will continue to monitor.        Problem: Cardiac Catheterization (Diagnostic/Interventional) (Adult)  Goal: Signs and Symptoms of Listed Potential Problems Will be Absent, Minimized or Managed (Cardiac Catheterization)  Outcome: Ongoing (interventions implemented as appropriate)    Goal: Anesthesia/Sedation Recovery  Outcome: Ongoing (interventions implemented as appropriate)

## 2018-10-15 NOTE — PLAN OF CARE
Problem: Patient Care Overview  Goal: Plan of Care Review  Outcome: Ongoing (interventions implemented as appropriate)   10/15/18 0431   Coping/Psychosocial   Plan of Care Reviewed With patient   Plan of Care Review   Progress improving   OTHER   Outcome Summary No complaints of pain or SOA. Lungs clear. VSS throughout shift. On IV lasix. Will continue to monitor.      Goal: Individualization and Mutuality  Outcome: Ongoing (interventions implemented as appropriate)    Goal: Discharge Needs Assessment  Outcome: Ongoing (interventions implemented as appropriate)    Goal: Interprofessional Rounds/Family Conf  Outcome: Ongoing (interventions implemented as appropriate)      Problem: Cardiac Catheterization (Diagnostic/Interventional) (Adult)  Goal: Signs and Symptoms of Listed Potential Problems Will be Absent, Minimized or Managed (Cardiac Catheterization)  Outcome: Ongoing (interventions implemented as appropriate)    Goal: Anesthesia/Sedation Recovery  Outcome: Ongoing (interventions implemented as appropriate)

## 2018-10-15 NOTE — PROGRESS NOTES
[facility]  Phone: [phone]  Ashley Fernández MD    Subjective:  LOS: 5    Status post right heart catheter this morning.  Feels better today.    Objective   Vital Signs past 24hrs    Temp range: Temp (24hrs), Av.8 °F (36.6 °C), Min:97.6 °F (36.4 °C), Max:98 °F (36.7 °C)    BP range: BP: ()/(57-78) 100/70  Pulse range: Heart Rate:  [70-80] 72  Resp rate range: Resp:  [16] 16    Device (Oxygen Therapy): room air   Oxygen range:SpO2:  [96 %-100 %] 96 %      86.9 kg (191 lb 9.6 oz); Body mass index is 26.72 kg/m².    Intake/Output Summary (Last 24 hours) at 10/15/18 1343  Last data filed at 10/15/18 1306   Gross per 24 hour   Intake              100 ml   Output             3675 ml   Net            -3575 ml       Physical Exam   Constitutional: He appears well-developed and well-nourished.   Cardiovascular: Normal rate and regular rhythm.    No murmur heard.  Pulmonary/Chest: He has no wheezes. He has no rales.   Mild coarse breath sounds that clear with cough   Abdominal: Soft. Bowel sounds are normal. There is no tenderness.   Musculoskeletal: He exhibits edema (mild now).   Neurological: He is alert.   Nursing note and vitals reviewed.    Results Review:    I have reviewed the laboratory and imaging data since the last note by [facility] physician.  My annotations are noted in assessment and plan.    Medication Review:  I have reviewed the current MAR.  My annotations are noted in assessment and plan.       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  Right heart cath noted.  Severe pulmonary hypertension with elevated biventricular filling pressure.  Continue diuresis per cardiology.  Currently not on candidate for vasodilator therapy due to  elevated wedge pressure.    Note findings of CT.  Sent cultures. Procalcitonin -ve. Note low Ig therefore elect to treat.  Complete Omnicef for 7 days.  MUST have fu CT in 8-10 weeks      Patient instructed to follow-up with Dr. Salazar in the office office after above CT chest, or sooner if new respiratory symptoms.    Ashley Fernández MD  10/15/18  1:43 PM    Part of this note may be an electronic transcription/translation of spoken language to printed text using the Dragon Dictation System.

## 2018-10-16 LAB
ALBUMIN 24H MFR UR ELPH: 15.2 %
ALPHA1 GLOB 24H MFR UR ELPH: 1.3 %
ALPHA2 GLOB 24H MFR UR ELPH: 4.7 %
ANION GAP SERPL CALCULATED.3IONS-SCNC: 12.8 MMOL/L
B-GLOBULIN MFR UR ELPH: 74.6 %
BACTERIA SPEC AEROBE CULT: NORMAL
BACTERIA SPEC AEROBE CULT: NORMAL
BUN BLD-MCNC: 28 MG/DL (ref 8–23)
BUN/CREAT SERPL: 23.5 (ref 7–25)
CALCIUM SPEC-SCNC: 9.4 MG/DL (ref 8.6–10.5)
CHLORIDE SERPL-SCNC: 93 MMOL/L (ref 98–107)
CO2 SERPL-SCNC: 32.2 MMOL/L (ref 22–29)
CREAT BLD-MCNC: 1.19 MG/DL (ref 0.76–1.27)
GAMMA GLOB 24H MFR UR ELPH: 4.2 %
GFR SERPL CREATININE-BSD FRML MDRD: 62 ML/MIN/1.73
GLUCOSE BLD-MCNC: 169 MG/DL (ref 65–99)
GLUCOSE BLDC GLUCOMTR-MCNC: 149 MG/DL (ref 70–130)
GLUCOSE BLDC GLUCOMTR-MCNC: 198 MG/DL (ref 70–130)
GLUCOSE BLDC GLUCOMTR-MCNC: 242 MG/DL (ref 70–130)
GLUCOSE BLDC GLUCOMTR-MCNC: 330 MG/DL (ref 70–130)
HIV 1 & 2 AB SER-IMP: ABNORMAL
INTERPRETATION UR IFE-IMP: ABNORMAL
M PROTEIN 24H MFR UR ELPH: 64.2 %
M PROTEIN 24H UR ELPH-MRATE: 1204 MG/24 HR
POTASSIUM BLD-SCNC: 4.1 MMOL/L (ref 3.5–5.2)
PROT 24H UR-MRATE: 1875 MG/24 HR (ref 30–150)
PROT UR-MCNC: 37.5 MG/DL
SODIUM BLD-SCNC: 138 MMOL/L (ref 136–145)

## 2018-10-16 PROCEDURE — 25010000002 ENOXAPARIN PER 10 MG: Performed by: INTERNAL MEDICINE

## 2018-10-16 PROCEDURE — 80048 BASIC METABOLIC PNL TOTAL CA: CPT | Performed by: INTERNAL MEDICINE

## 2018-10-16 PROCEDURE — 99232 SBSQ HOSP IP/OBS MODERATE 35: CPT | Performed by: INTERNAL MEDICINE

## 2018-10-16 PROCEDURE — 82962 GLUCOSE BLOOD TEST: CPT

## 2018-10-16 PROCEDURE — 63710000001 INSULIN ASPART PER 5 UNITS: Performed by: NURSE PRACTITIONER

## 2018-10-16 RX ADMIN — BUMETANIDE 2 MG: 0.25 INJECTION INTRAMUSCULAR; INTRAVENOUS at 08:41

## 2018-10-16 RX ADMIN — INSULIN ASPART 2 UNITS: 100 INJECTION, SOLUTION INTRAVENOUS; SUBCUTANEOUS at 18:06

## 2018-10-16 RX ADMIN — CARVEDILOL 3.12 MG: 6.25 TABLET, FILM COATED ORAL at 11:36

## 2018-10-16 RX ADMIN — SACUBITRIL AND VALSARTAN 1 TABLET: 24; 26 TABLET, FILM COATED ORAL at 08:50

## 2018-10-16 RX ADMIN — INSULIN ASPART 3 UNITS: 100 INJECTION, SOLUTION INTRAVENOUS; SUBCUTANEOUS at 21:08

## 2018-10-16 RX ADMIN — GLIPIZIDE 5 MG: 5 TABLET ORAL at 08:41

## 2018-10-16 RX ADMIN — CEFDINIR 300 MG: 300 CAPSULE ORAL at 08:41

## 2018-10-16 RX ADMIN — SACUBITRIL AND VALSARTAN 1 TABLET: 24; 26 TABLET, FILM COATED ORAL at 21:08

## 2018-10-16 RX ADMIN — ATORVASTATIN CALCIUM 20 MG: 20 TABLET, FILM COATED ORAL at 08:41

## 2018-10-16 RX ADMIN — CEFDINIR 300 MG: 300 CAPSULE ORAL at 21:08

## 2018-10-16 RX ADMIN — CARVEDILOL 3.12 MG: 6.25 TABLET, FILM COATED ORAL at 21:08

## 2018-10-16 RX ADMIN — CARVEDILOL 3.12 MG: 6.25 TABLET, FILM COATED ORAL at 00:23

## 2018-10-16 RX ADMIN — INSULIN ASPART 5 UNITS: 100 INJECTION, SOLUTION INTRAVENOUS; SUBCUTANEOUS at 11:36

## 2018-10-16 RX ADMIN — ENOXAPARIN SODIUM 40 MG: 40 INJECTION SUBCUTANEOUS at 11:36

## 2018-10-16 NOTE — NURSING NOTE
Patient able to ambulate stairs with minimal shortness of breath. Patient states that he is able to ambulate stairs better than before his initial heart cath on this hospital stay.

## 2018-10-16 NOTE — PROGRESS NOTES
"Patient Care Team:  Enmanuel Renae MD as PCP - General (Internal Medicine)  Katelynn Figueroa MD as Consulting Physician (Endocrinology)  Billy Cortez OD as Consulting Physician (Optometry)  Wm Deleon MD as Referring Physician (Cardiology)    Chief Complaint: Follow-up acute systolic and diastolic heart failure, severe pulmonary hypertension    Interval History:   Repeat right heart catheterization yesterday with continued elevated filling pressures.  Moved over to Bumex and a one-time dose of metolazone.  Patient diuresed over 4 L.    Feels well     Objective   Vital Signs  Temp:  [97.5 °F (36.4 °C)-98 °F (36.7 °C)] 98 °F (36.7 °C)  Heart Rate:  [72-79] 72  Resp:  [16] 16  BP: ()/(56-78) 97/63    Intake/Output Summary (Last 24 hours) at 10/16/18 0720  Last data filed at 10/16/18 0604   Gross per 24 hour   Intake              460 ml   Output             4925 ml   Net            -4465 ml     Flowsheet Rows      First Filed Value   Admission Height  180.3 cm (71\") Documented at 10/10/2018 1001   Admission Weight  90 kg (198 lb 8 oz) Documented at 10/10/2018 1005          General Appearance:    Alert, cooperative, in no acute distress   Head:    Normocephalic, without obvious abnormality, atraumatic       Neck:   No adenopathy, supple, no thyromegaly, no carotid bruit, no    JVD   Lungs:     Clear to auscultation bilaterally, no wheezes, rales, or     rhonchi    Heart:    Normal rate, regular rhythm,  No murmur, rub, or gallop   Chest Wall:    No abnormalities observed   Abdomen:     Normal bowel sounds, soft, non-tender, non-distended,            no rebound tenderness   Extremities:   No cyanosis, clubbing, or edema   Pulses:   Pulses palpable and equal bilaterally   Skin:   No bleeding or rash     atorvastatin 20 mg Oral Daily   bumetanide 2 mg Intravenous Q8H   carvedilol 3.125 mg Oral BID   cefdinir 300 mg Oral Q12H   enoxaparin 40 mg Subcutaneous Q24H   fluticasone 2 spray " Nasal Daily   glipiZIDE 5 mg Oral QAM AC   insulin aspart 0-7 Units Subcutaneous 4x Daily With Meals & Nightly   sacubitril-valsartan 1 tablet Oral Q12H            Results Review:      Results from last 7 days  Lab Units 10/16/18  0514   SODIUM mmol/L 138   POTASSIUM mmol/L 4.1   CHLORIDE mmol/L 93*   CO2 mmol/L 32.2*   BUN mg/dL 28*   CREATININE mg/dL 1.19   GLUCOSE mg/dL 169*   CALCIUM mg/dL 9.4           Results from last 7 days  Lab Units 10/15/18  1305  10/11/18  0336   WBC 10*3/mm3  --   --  7.17   HEMOGLOBIN g/dL  --   --  12.7*   HEMOGLOBIN, POC g/dL 15.0  < >  --    HEMATOCRIT %  --   --  40.8   HEMATOCRIT POC % 44  < >  --    PLATELETS 10*3/mm3  --   --  179   < > = values in this interval not displayed.            Results from last 7 days  Lab Units 10/14/18  0402   MAGNESIUM mg/dL 2.2         @LABRCNT(bnp)@  I reviewed the patient's new clinical results.  I personally viewed and interpreted the patient's EKG/Telemetry data        Assessment/Plan   1  Acute systolic and diastolic heart failure: Ejection fraction 44%  2. Moderate concentric LVH: No significant change from prior  3. Severe pulmonary hypertension  4. Essential hypertension: Controlled  5. Monoclonal gammopathy: Workup per hematology.     -Cardiac MRI with no evidence of atrial enlargement or significant infiltration of the myocardium.  Some issues with nulling that can be seen with amyloid, however, the remainder of the cardiac MRI is not consistent with that.    -Filling pressures on right heart catheterization slightly improved    -My plan at this point in time is to continue diuresis.  I discussed with the patient that I think that he needs to be evaluated soon as an outpatient by an advanced heart failure team and pulmonary hypertension team.  I have recommended [facility] due to location and expertise.  His pulmonary pressures are as high as systemic pressure.  This is driven somewhat by his increased filling pressures but I would  imagine that even in a euvolemic state he is going to have a PA systolic in the 70s or 80s or sooner.

## 2018-10-16 NOTE — PLAN OF CARE
Problem: Patient Care Overview  Goal: Plan of Care Review  Outcome: Ongoing (interventions implemented as appropriate)   10/16/18 1526   Coping/Psychosocial   Plan of Care Reviewed With patient   Plan of Care Review   Progress improving   OTHER   Outcome Summary IV diuretics increase. On room air and able to ambulate well. Possible d/c in am.     Goal: Individualization and Mutuality  Outcome: Ongoing (interventions implemented as appropriate)    Goal: Discharge Needs Assessment  Outcome: Ongoing (interventions implemented as appropriate)    Goal: Interprofessional Rounds/Family Conf  Outcome: Ongoing (interventions implemented as appropriate)      Problem: Cardiac Catheterization (Diagnostic/Interventional) (Adult)  Goal: Signs and Symptoms of Listed Potential Problems Will be Absent, Minimized or Managed (Cardiac Catheterization)  Outcome: Ongoing (interventions implemented as appropriate)   10/16/18 1526   Goal/Outcome Evaluation   Problems Assessed (Cardiac Catheterization) all   Problems Present (Cardiac Cath) none       Problem: Pulmonary Impairment (IRF) (Adult)  Goal: Optimal Level of Pulmonary Function  Outcome: Ongoing (interventions implemented as appropriate)   10/16/18 1526   Pulmonary Impairment (IRF) (Adult)   Optimal Level of Pulmonary Function progressing to functional independence     Goal: Optimal Health Related Quality of Life and Risk Factor Reduction  Outcome: Ongoing (interventions implemented as appropriate)

## 2018-10-16 NOTE — PROGRESS NOTES
Discharge Planning Assessment  [facility]     Patient Name: Billy Bourne  MRN: [id number]  Today's Date: 10/16/2018    Admit Date: 10/10/2018          Discharge Needs Assessment     Row Name 10/16/18 1247       Living Environment    Lives With spouse    Name(s) of Who Lives With Patient Karuna Bourne, spouse    Current Living Arrangements home/apartment/condo    Primary Care Provided by self    Provides Primary Care For no one    Family Caregiver if Needed spouse    Family Caregiver Names Karuna, spouse    Quality of Family Relationships helpful;involved;supportive       Resource/Environmental Concerns    Resource/Environmental Concerns none       Transition Planning    Patient/Family Anticipates Transition to home with family    Patient/Family Anticipated Services at Transition none    Transportation Anticipated family or friend will provide       Discharge Needs Assessment    Readmission Within the Last 30 Days no previous admission in last 30 days    Concerns to be Addressed no discharge needs identified;denies needs/concerns at this time    Equipment Currently Used at Home none    Anticipated Changes Related to Illness none    Equipment Needed After Discharge none    Offered/Gave Vendor List yes   Pt declined services            Discharge Plan     Row Name 10/16/18 1249       Plan    Plan Home; Denies needs    Patient/Family in Agreement with Plan yes    Plan Comments Spoke with Pt and spouse Karuna Bourne [phone] at bedside.  Pt dtr and grand baby from [place] at bedside as well.  CCP received permission from Pt prior to speaking in front of visitors.  CCP introduced self and role.  Pt confirmed information on face sheet.  Pt stated he is IADL'S, works full time & drives.  Pt stays active gardening, lawn care, working on his pool and walking.  Pt lives in a house with two entrance steps with his spouse.  Pt denies past home health, subacute rehab and DME.  Pt confirms pharmacy as [facility] on [address].  Pt  denies issues with affording his medications.  Pt plans to return home at discharge and denies needs at this time.  CCP will continue to follow…RAUL WELLS/CCP        Destination     No service coordination in this encounter.      Durable Medical Equipment     No service coordination in this encounter.      Dialysis/Infusion     No service coordination in this encounter.      Home Medical Care     No service coordination in this encounter.      Social Care     No service coordination in this encounter.        Expected Discharge Date and Time     Expected Discharge Date Expected Discharge Time    Oct 10, 2018               Demographic Summary     Row Name 10/16/18 1245       General Information    Admission Type inpatient    Arrived From home    Referral Source admission list    Reason for Consult discharge planning    Preferred Language English     Used During This Interaction no            Functional Status     Row Name 10/16/18 1245       Functional Status    Usual Activity Tolerance good    Current Activity Tolerance good       Functional Status, IADL    Medications independent    Meal Preparation independent    Housekeeping independent    Laundry independent    Shopping independent       Mental Status    General Appearance WDL WDL       Mental Status Summary    Recent Changes in Mental Status/Cognitive Functioning no changes       Employment/    Employment Status employed full time            Psychosocial    No documentation.           Abuse/Neglect     Row Name 10/16/18 1247       Personal Safety    Feels Unsafe at Home or Work/School no    Feels Threatened by Someone no    Does Anyone Try to Keep You From Having Contact with Others or Doing Things Outside Your Home? no    Physical Signs of Abuse Present no            Legal    No documentation.           Substance Abuse    No documentation.           Patient Forms    No documentation.         Gabbi Beltran RN

## 2018-10-16 NOTE — PLAN OF CARE
Problem: Patient Care Overview  Goal: Plan of Care Review  Outcome: Ongoing (interventions implemented as appropriate)   10/16/18 9519   Plan of Care Review   Progress no change   OTHER   Outcome Summary Pt continues IV diuretics with good output, denies pain, discomfort or soa, rested well through out night, VSS     Goal: Discharge Needs Assessment  Outcome: Ongoing (interventions implemented as appropriate)      Problem: Cardiac Catheterization (Diagnostic/Interventional) (Adult)  Goal: Signs and Symptoms of Listed Potential Problems Will be Absent, Minimized or Managed (Cardiac Catheterization)  Outcome: Ongoing (interventions implemented as appropriate)

## 2018-10-16 NOTE — PROGRESS NOTES
[facility]  Phone: [phone]  Ashley Fernández MD    Subjective:  LOS: 6    Continues to  Feels better today.  No issues reported.    Objective   Vital Signs past 24hrs    Temp range: Temp (24hrs), Av.9 °F (36.6 °C), Min:97.5 °F (36.4 °C), Max:98.5 °F (36.9 °C)    BP range: BP: ()/(56-78) 95/62  Pulse range: Heart Rate:  [71-79] 71  Resp rate range: Resp:  [16-18] 18    Device (Oxygen Therapy): room air   Oxygen range:SpO2:  [95 %-97 %] 97 %      84.6 kg (186 lb 6.4 oz); Body mass index is 26 kg/m².    Intake/Output Summary (Last 24 hours) at 10/16/18 1153  Last data filed at 10/16/18 0604   Gross per 24 hour   Intake              460 ml   Output             4000 ml   Net            -3540 ml       Physical Exam   Constitutional: He appears well-developed and well-nourished.   Cardiovascular: Normal rate and regular rhythm.    No murmur heard.  Pulmonary/Chest: He has no wheezes. He has no rales.   Mild coarse breath sounds that clear with cough   Abdominal: Soft. Bowel sounds are normal. There is no tenderness.   Musculoskeletal: He exhibits edema (mild now).   Neurological: He is alert.   Nursing note and vitals reviewed.    Results Review:    I have reviewed the laboratory and imaging data since the last note by [facility] physician.  My annotations are noted in assessment and plan.    Medication Review:  I have reviewed the current MAR.  My annotations are noted in assessment and plan.       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  Bumex initiated.  Right heart cath noted.  Severe pulmonary hypertension with elevated biventricular filling pressure.  Continue diuresis per cardiology.  Currently not on candidate for vasodilator therapy due to  elevated wedge pressure.    Note findings of CT.  Sent cultures. Procalcitonin -ve. Note low Ig therefore elect to treat.  Complete Omnicef for 7 days.  MUST have fu CT in 8-10 weeks      Patient instructed to follow-up with Dr. Salazar in the office office after above CT chest, or sooner if new respiratory symptoms.    Ashley Fernández MD  10/16/18  11:53 AM    Part of this note may be an electronic transcription/translation of spoken language to printed text using the Dragon Dictation System.

## 2018-10-17 VITALS
HEIGHT: 71 IN | WEIGHT: 184.25 LBS | BODY MASS INDEX: 25.79 KG/M2 | SYSTOLIC BLOOD PRESSURE: 91 MMHG | OXYGEN SATURATION: 98 % | HEART RATE: 77 BPM | RESPIRATION RATE: 18 BRPM | TEMPERATURE: 98 F | DIASTOLIC BLOOD PRESSURE: 65 MMHG

## 2018-10-17 DIAGNOSIS — I51.7 LVH (LEFT VENTRICULAR HYPERTROPHY): ICD-10-CM

## 2018-10-17 DIAGNOSIS — I50.43 CHF (CONGESTIVE HEART FAILURE), NYHA CLASS III, ACUTE ON CHRONIC, COMBINED (HCC): Primary | ICD-10-CM

## 2018-10-17 DIAGNOSIS — I27.20 PULMONARY HYPERTENSION (HCC): ICD-10-CM

## 2018-10-17 DIAGNOSIS — I50.41 ACUTE COMBINED SYSTOLIC AND DIASTOLIC CONGESTIVE HEART FAILURE (HCC): ICD-10-CM

## 2018-10-17 LAB
ANION GAP SERPL CALCULATED.3IONS-SCNC: 14.1 MMOL/L
BUN BLD-MCNC: 29 MG/DL (ref 8–23)
BUN/CREAT SERPL: 30.9 (ref 7–25)
CALCIUM SPEC-SCNC: 9.6 MG/DL (ref 8.6–10.5)
CHLORIDE SERPL-SCNC: 91 MMOL/L (ref 98–107)
CO2 SERPL-SCNC: 29.9 MMOL/L (ref 22–29)
CREAT BLD-MCNC: 0.94 MG/DL (ref 0.76–1.27)
GFR SERPL CREATININE-BSD FRML MDRD: 81 ML/MIN/1.73
GLUCOSE BLD-MCNC: 168 MG/DL (ref 65–99)
GLUCOSE BLDC GLUCOMTR-MCNC: 172 MG/DL (ref 70–130)
GLUCOSE BLDC GLUCOMTR-MCNC: 275 MG/DL (ref 70–130)
POTASSIUM BLD-SCNC: 3.6 MMOL/L (ref 3.5–5.2)
SODIUM BLD-SCNC: 135 MMOL/L (ref 136–145)

## 2018-10-17 PROCEDURE — 82962 GLUCOSE BLOOD TEST: CPT

## 2018-10-17 PROCEDURE — 63710000001 INSULIN ASPART PER 5 UNITS: Performed by: NURSE PRACTITIONER

## 2018-10-17 PROCEDURE — 25010000002 ENOXAPARIN PER 10 MG: Performed by: INTERNAL MEDICINE

## 2018-10-17 PROCEDURE — 80048 BASIC METABOLIC PNL TOTAL CA: CPT | Performed by: INTERNAL MEDICINE

## 2018-10-17 PROCEDURE — 99239 HOSP IP/OBS DSCHRG MGMT >30: CPT | Performed by: INTERNAL MEDICINE

## 2018-10-17 RX ORDER — BUMETANIDE 2 MG/1
2 TABLET ORAL 2 TIMES DAILY
Qty: 60 TABLET | Refills: 6 | Status: SHIPPED | OUTPATIENT
Start: 2018-10-18 | End: 2019-04-06

## 2018-10-17 RX ORDER — CEFDINIR 300 MG/1
300 CAPSULE ORAL EVERY 12 HOURS SCHEDULED
Qty: 3 CAPSULE | Refills: 0 | Status: SHIPPED | OUTPATIENT
Start: 2018-10-17 | End: 2018-10-19

## 2018-10-17 RX ORDER — BUMETANIDE 2 MG/1
2 TABLET ORAL 2 TIMES DAILY
Status: DISCONTINUED | OUTPATIENT
Start: 2018-10-18 | End: 2018-10-17 | Stop reason: HOSPADM

## 2018-10-17 RX ADMIN — SACUBITRIL AND VALSARTAN 1 TABLET: 24; 26 TABLET, FILM COATED ORAL at 11:57

## 2018-10-17 RX ADMIN — INSULIN ASPART 2 UNITS: 100 INJECTION, SOLUTION INTRAVENOUS; SUBCUTANEOUS at 07:59

## 2018-10-17 RX ADMIN — BUMETANIDE 2 MG: 0.25 INJECTION INTRAMUSCULAR; INTRAVENOUS at 09:24

## 2018-10-17 RX ADMIN — BUMETANIDE 2 MG: 0.25 INJECTION INTRAMUSCULAR; INTRAVENOUS at 00:12

## 2018-10-17 RX ADMIN — CARVEDILOL 3.12 MG: 6.25 TABLET, FILM COATED ORAL at 11:00

## 2018-10-17 RX ADMIN — ATORVASTATIN CALCIUM 20 MG: 20 TABLET, FILM COATED ORAL at 09:24

## 2018-10-17 RX ADMIN — POTASSIUM CHLORIDE 40 MEQ: 750 CAPSULE, EXTENDED RELEASE ORAL at 07:59

## 2018-10-17 RX ADMIN — INSULIN ASPART 4 UNITS: 100 INJECTION, SOLUTION INTRAVENOUS; SUBCUTANEOUS at 11:56

## 2018-10-17 RX ADMIN — GLIPIZIDE 5 MG: 5 TABLET ORAL at 07:59

## 2018-10-17 RX ADMIN — ENOXAPARIN SODIUM 40 MG: 40 INJECTION SUBCUTANEOUS at 11:00

## 2018-10-17 RX ADMIN — CEFDINIR 300 MG: 300 CAPSULE ORAL at 09:24

## 2018-10-17 NOTE — PROGRESS NOTES
Billy Rayramy was counseled on new medications at discharge: Entresto, bumetadine, and cefdinir.     Information was provided on the following: medication name, dose, route of administration, and duration of therapy; indication; special directions and precautions; common and clinically significant adverse effects, interactions, or contraindications that may be encountered, including their avoidance and the action required should they occur.    I also discussed proper storage; refill information; handeling missed dose.      he was provided with printed information as well.     Arleth Chadwick PharmD, BCPS  10/17/2018 3:43 PM

## 2018-10-17 NOTE — PLAN OF CARE
Problem: Patient Care Overview  Goal: Plan of Care Review  Outcome: Ongoing (interventions implemented as appropriate)   10/17/18 0515   Plan of Care Review   Progress no change   OTHER   Outcome Summary Continues good output with IV bumex, denies pain or discomfort, rested well through out night     Goal: Discharge Needs Assessment  Outcome: Ongoing (interventions implemented as appropriate)      Problem: Pulmonary Impairment (IRF) (Adult)  Goal: Optimal Level of Pulmonary Function  Outcome: Ongoing (interventions implemented as appropriate)    Goal: Optimal Health Related Quality of Life and Risk Factor Reduction  Outcome: Ongoing (interventions implemented as appropriate)

## 2018-10-17 NOTE — DISCHARGE SUMMARY
Date of Discharge:  10/17/2018  Date of Admit: 10/10/2018    Discharge Diagnosis:  1.  Acute combined systolic and diastolic heart failure  2.  Severe pulmonary hypertension  3.  Moderate LVH  4.  Community-acquired pneumonia  5.  Monoclonal protein: urine      Hospital Course: 63-year-old male with a medical history of diabetes mellitus, essential hypertension, moderate LVH,  diastolic dysfunction documented on echocardiogram in 2017, who presented to his primary care physician's office with the complaint of increasing bilateral lower extremity edema and dyspnea both at rest and with exertion.  Per his report this was at least moderate in intensity and noticed mostly when he walked up a flight of stairs or up an incline.  He did not notice this as much walking on flat ground.  He was felt to be volume overloaded.  His proBNP was elevated.  He was placed on an ACE inhibitor by his primary care physician and a repeat transthoracic echocardiogram was ordered.  He was noted again to have moderate LVH, however his ejection fraction now appeared to be mildly decreased at 40%.  In addition, his RVSP was estimated at 67 mmHg which was new.  It was documented that there was an echo bright appearance of the myocardium suspicious for amyloid heart disease.  The patient was scheduled for an outpatient left and right heart catheterization.    Heart catheterization revealed a normal coronary angiography.  His LVEDP was elevated at 32 mmHg.  The wedge and right atrial pressures were severely elevated, and he was also documented to have severe pulmonary hypertension in the setting of this volume overload.  He was subsequently admitted to the hospital for IV diuresis, hematology evaluation for possible amyloid, and pulmonary evaluation with this degree of pulmonary hypertension.    He was initially diuresed with IV Lasix with a good response and improvement in his dyspnea.  Although he did not have significant increase in his  bicarbonate, BUN, or creatinine, he was taken back for repeat right heart catheterization 5 days later.  He was still noted to be volume overloaded.  His pulmonary pressures were still severely elevated.  He was moved over to [facility] and given a one time dose of metolazone with a significant diuresis of around 8 L net negative over the next 48 hours.  His sodium decreased slightly, BUN increased, and he showed signs of a contraction alkalosis.  He was moved over to by mouth Bumex at that time.    He was evaluated by hematology.  He was initially not felt to have clear evidence of amyloid, however there follow-up evaluation in the setting of a M spike of greater than 1 g on his 24 hour urine is pending.  I did end up going ahead and getting a cardiac MRI during his hospitalization which showed normal perfusion of the myocardium and normal biatrial size.  He did have suboptimal nulling of the myocardium documented.  He did not have a endomyocardial biopsy.    The patient and I have discussed that I think his best plan of action with the degree of his pulmonary hypertension and still questionable findings during his workup of his monoclonal protein, would be to follow-up with the heart failure team and pulmonary hypertension team at [facility].  I have placed a referral for Nabila Stuart and Mago Patterson.  I will defer repeat right heart catheterization or nitric oxide evaluation to them as we do not have nitric available here.  I will also make sure that they have copy of the cardiac MRI films and lab work.    Procedures Performed  Procedure(s):  Right Heart Cath, vasodilator study if pressures are elevated      10/10/18 Osmin Deleon  Procedure findings:  Left main: Large-caliber vessel that bifurcates to an LAD and circumflex.  Normal  LAD: Medium caliber vessel that gives rise to a small caliber diagonal branch.  Normal  LCX: Medium caliber vessel that gives rise to a medium caliber OM1 and OM 2.  Normal  RCA: Large  caliber dominant vessel that gives rise to a medium caliber PDA and small caliber RPL.  Normal     Left ventricular angiography: Normal left ventricular size.  Mildly reduced left ventricular systolic function.  Ejection fraction 40%.  Inferior wall hypokinesis     Hemodynamics:   LV: 103/32  AO: 103/69  RA: 23/24/20  RV: 94/18  PA: 92/40/55  Wedge: 40/50/34     Saturations:   SVC 58%  PA 58%  AO 94%    Conclusions:   1.  Normal coronary angiography  2.  Severely elevated right and left-sided filling pressure  3.  Severe pulmonary hypertension         10/15/18 Repeat: Justus Juan  RIGHT HEART HEMODYNAMICS:   1.  Pulmonary wedge pressure: 38/31, 32  2.  Pulmonary artery pressure: 87/39, 57  3.  Right ventricular pressure: 88/8, 16  4.  Right atrial pressure: 18/17, 15  5.  Pulmonary artery saturation: 66% on room air  6.  Ingrid calculated cardiac output 4.08 L/m, cardiac index 1.97 L/m/m²     ADENOSINE VASODILATOR CHALLENGE:  Adenosine at 50 mcg/kg/m   Pulmonary artery pressure: 90/37, 60  Pulmonary artery saturation: 66% on room air     Adenosine at 100 mcg/kg/m  Pulmonary artery pressure: 98/37, 61  Pulmonary artery saturation: 69% on room air     Adenosine at 150 mcg/kg/m  Pulmonary artery pressure: 91/37, 57  Pulmonary artery saturation: 70% on room air     Adenosine at 200 mcg/kg/m  Pulmonary artery pressure: 90/34, 57  Pulmonary artery saturation: 73% on room air  Cardiac output 7.49 L/m, cardiac index 3.62 L/m/m²        POST-PROCEDURE DIAGNOSIS:   1. Severe pulmonary hypertension  2. Severe biventricular filling pressures    Consults     Date and Time Order Name Status Description    10/10/2018 1249 Inpatient Pulmonology Consult Completed     10/10/2018 1144 Hematology & Oncology Inpatient Consult Completed           Pertinent Test Results:   Cardiac MRI  10/12/18  Conclusion:  1.  There is moderate left ventricular hypertrophy.  2.  There is mildly decreased left ventricular systolic function with an ejection  "fraction of 44%.  3.  There is normal right ventricular size.  4.  There is mild left ventricular decrease in systolic function.  5.  There is normal perfusion of the myocardium  6.  There was suboptimal nulling of the myocardium but with diffuse late gadolinium enhancement.    CT chest   IMPRESSION:  Minimal focal reticulonodular infiltrate in the superior segment of the left lower lobe consistent with pneumonia..  Cholelithiasis. Minimal ascites. Otherwise unremarkable CT scan of the chest abdomen and pelvis. No osseous lytic lesions are identified.    Bone scan  IMPRESSIONS: No lytic lesions are identified.      Discharge Physical Exam:  Temp:  [97.5 °F (36.4 °C)-98 °F (36.7 °C)] 98 °F (36.7 °C)  Heart Rate:  [67-84] 77  Resp:  [18] 18  BP: ()/(48-77) 91/65    Intake/Output Summary (Last 24 hours) at 10/17/18 1417  Last data filed at 10/17/18 1144   Gross per 24 hour   Intake             1080 ml   Output             3000 ml   Net            -1920 ml     Flowsheet Rows      First Filed Value   Admission Height  180.3 cm (71\") Documented at 10/10/2018 1001   Admission Weight  90 kg (198 lb 8 oz) Documented at 10/10/2018 1005          General Appearance:    Alert, cooperative, in no acute distress   Head:    Normocephalic, without obvious abnormality, atraumatic       Neck:   No adenopathy, supple, no thyromegaly, no carotid bruit, no    JVD   Lungs:     Clear to auscultation bilaterally, no wheezes, rales, or     rhonchi    Heart:    Normal rate, regular rhythm,  No murmur, rub, or gallop   Chest Wall:    No abnormalities observed   Abdomen:     Normal bowel sounds, soft, non-tender, non-distended,            no rebound tenderness   Extremities:   No cyanosis, clubbing, or edema   Pulses:   Pulses palpable and equal bilaterally   Skin:   No bleeding or rash       Neurologic:   Cranial nerves 2 - 12 grossly intact, sensation intact             Discharge Medications     Discharge Medications      New " Medications      Instructions Start Date   bumetanide 2 MG tablet  Commonly known as:  BUMEX   2 mg, Oral, 2 Times Daily      cefdinir 300 MG capsule  Commonly known as:  OMNICEF   300 mg, Oral, Every 12 Hours Scheduled      sacubitril-valsartan 24-26 MG tablet  Commonly known as:  ENTRESTO   1 tablet, Oral, Every 12 Hours Scheduled      zolpidem 5 MG tablet  Commonly known as:  AMBIEN   Bring to sleep lab DO NOT use at home. PLEASE take if not asleep within 30 mins of start of study.         Continue These Medications      Instructions Start Date   atorvastatin 20 MG tablet  Commonly known as:  LIPITOR   20 mg, Oral, Daily      carvedilol 3.125 MG tablet  Commonly known as:  COREG   3.125 mg, Oral, 2 Times Daily      CLARITIN 10 MG tablet  Generic drug:  loratadine   10 mg, Oral, Daily      fluticasone 50 MCG/ACT nasal spray  Commonly known as:  FLONASE   2 sprays, Nasal, Daily      glimepiride 2 MG tablet  Commonly known as:  AMARYL   2 mg, Oral, Every Morning Before Breakfast      SYNJARDY XR 12.5-1000 MG tablet sustained-release 24 hour  Generic drug:  Empagliflozin-Metformin HCl ER   2 tablets, Oral, Daily         Stop These Medications    furosemide 40 MG tablet  Commonly known as:  LASIX     valsartan 80 MG tablet  Commonly known as:  DIOVAN            Discharge Diet: Cardiac     Activity at Discharge:  ad ally.    Discharge disposition: Home    Condition on Discharge: Fair    Follow-up Appointments  Future Appointments  Date Time Provider Department Center   10/24/2018 12:20 PM LAB CHAIR [facility]  [facility]   10/24/2018 1:00 PM James Canales MD [facility]   10/31/2018 7:30 PM [facility] SLEEP ROOMS [facility] SLEEP [facility]   12/10/2018 9:00 AM Wm Deleon MD [facility] None     Additional Instructions for the Follow-ups that You Need to Schedule     Basic Metabolic Panel     Oct 22, 2018 (Approximate)      Basic Metabolic Panel     Oct 23, 2018 (Approximate)      CT chest wo contrast     Dec 11, 2018      Fax results to dr. nika rivera [phone]          Test Results Pending at Discharge       Wm Deleon MD  10/17/18  1:58 PM    Greater than 30 min spent in reviewing records, discussion and examination of the patient and discussion with other members of the patient's medical team.     Dictated utilizing Dragon dictation

## 2018-10-18 ENCOUNTER — READMISSION MANAGEMENT (OUTPATIENT)
Dept: CALL CENTER | Facility: HOSPITAL | Age: 63
End: 2018-10-18

## 2018-10-18 ENCOUNTER — TELEPHONE (OUTPATIENT)
Dept: CARDIOLOGY | Facility: CLINIC | Age: 63
End: 2018-10-18

## 2018-10-18 NOTE — TELEPHONE ENCOUNTER
Dionna calling stating that her Dad was D/C yest from the hosp. Pul. HTN, fluid retension   He is on Carvedilol 3.125mg 1 po bid , Entresto 24/26 1 po bid and Bumex 2mg 1 po bid  He took all these around 10:00 around 12:00 he was not feeling well, weak feeling  His BP was 83/50, waited and little while later 85/53 and finally came up to 90/58, pulse around 72  Dionna was wondering if they need to make any adjustments in his medication    [phone]

## 2018-10-18 NOTE — TELEPHONE ENCOUNTER
I called and left his daughter a message and told her to take his blood pressure and his systolic was 90 that he should take that medication.  He should hold the medicines in the a.m. and have his labs done and we will call him to make decisions on were to go from there

## 2018-10-18 NOTE — OUTREACH NOTE
Prep Survey      Responses   Facility patient discharged from?  [facility]   Is patient eligible?  Yes   Discharge diagnosis  Combined CHF -heart cath this visit   Does the patient have one of the following disease processes/diagnoses(primary or secondary)?  CHF   Does the patient have Home health ordered?  No   Is there a DME ordered?  No   Prep survey completed?  Yes          Larissa Guerrero RN

## 2018-10-19 ENCOUNTER — LAB (OUTPATIENT)
Dept: LAB | Facility: HOSPITAL | Age: 63
End: 2018-10-19

## 2018-10-19 ENCOUNTER — READMISSION MANAGEMENT (OUTPATIENT)
Dept: CALL CENTER | Facility: HOSPITAL | Age: 63
End: 2018-10-19

## 2018-10-19 DIAGNOSIS — I27.20 PULMONARY HYPERTENSION (HCC): ICD-10-CM

## 2018-10-19 LAB
ANION GAP SERPL CALCULATED.3IONS-SCNC: 14.2 MMOL/L
BUN BLD-MCNC: 42 MG/DL (ref 8–23)
BUN/CREAT SERPL: 32.6 (ref 7–25)
CALCIUM SPEC-SCNC: 9.9 MG/DL (ref 8.6–10.5)
CHLORIDE SERPL-SCNC: 89 MMOL/L (ref 98–107)
CO2 SERPL-SCNC: 30.8 MMOL/L (ref 22–29)
CREAT BLD-MCNC: 1.29 MG/DL (ref 0.76–1.27)
GFR SERPL CREATININE-BSD FRML MDRD: 56 ML/MIN/1.73
GLUCOSE BLD-MCNC: 161 MG/DL (ref 65–99)
POTASSIUM BLD-SCNC: 4.4 MMOL/L (ref 3.5–5.2)
SODIUM BLD-SCNC: 134 MMOL/L (ref 136–145)

## 2018-10-19 PROCEDURE — 80048 BASIC METABOLIC PNL TOTAL CA: CPT

## 2018-10-19 PROCEDURE — 84155 ASSAY OF PROTEIN SERUM: CPT | Performed by: INTERNAL MEDICINE

## 2018-10-19 PROCEDURE — 84165 PROTEIN E-PHORESIS SERUM: CPT | Performed by: INTERNAL MEDICINE

## 2018-10-19 NOTE — OUTREACH NOTE
CHF Week 1 Survey      Responses   Facility patient discharged from?  [facility]   Does the patient have one of the following disease processes/diagnoses(primary or secondary)?  CHF   Is there a successful TCM telephone encounter documented?  No   CHF Week 1 attempt successful?  Yes   Call start time  1432   Call end time  1442   Discharge diagnosis  Combined CHF -heart cath this visit   Meds reviewed with patient/caregiver?  Yes   Is the patient having any side effects they believe may be caused by any medication additions or changes?  No   Does the patient have all medications ordered at discharge?  Yes   Is the patient taking all medications as directed (includes completed medication regime)?  Yes   Medication comments  MD stopped Bumex today and then resume 2mg daily instead of BID   Does the patient have a primary care provider?   Yes   Does the patient have an appointment with their PCP within 7 days of discharge?  Yes   Has the patient kept scheduled appointments due by today?  N/A   Psychosocial issues?  No   Comments  BP 94/64   Did the patient receive a copy of their discharge instructions?  Yes   Nursing interventions  Reviewed instructions with patient   What is the patient's perception of their health status since discharge?  Improving   Nursing interventions  Nurse provided patient education   Is the patient weighing daily?  Yes   Does the patient have scales?  Yes   Daily weight interventions  Education provided on importance of daily weight   Is the patient able to teach back Heart Failure diet management?  Yes   Is the patient able to teach back Heart Failure Zones?  Yes   Is the patient able to teach back signs and symptoms of worsening condition? (i.e. weight gain, shortness of air, etc.)  Yes   If the patient is a current smoker, are they able to teach back resources for cessation?  -- [Pt is nonsmoker]   Is the patient/caregiver able to teach back the hierarchy of who to call/visit for  symptoms/problems? PCP, Specialist, Home health nurse, Urgent Care, ED, 911  Yes    CHF Week 1 call completed?  Yes          Kiara Solo RN

## 2018-10-22 LAB
ALBUMIN SERPL-MCNC: 3.7 G/DL (ref 2.9–4.4)
ALBUMIN/GLOB SERPL: 1.1 {RATIO} (ref 0.7–1.7)
ALPHA1 GLOB FLD ELPH-MCNC: 0.4 G/DL (ref 0–0.4)
ALPHA2 GLOB SERPL ELPH-MCNC: 1.1 G/DL (ref 0.4–1)
B-GLOBULIN SERPL ELPH-MCNC: 1.2 G/DL (ref 0.7–1.3)
GAMMA GLOB SERPL ELPH-MCNC: 0.7 G/DL (ref 0.4–1.8)
GLOBULIN SER CALC-MCNC: 3.3 G/DL (ref 2.2–3.9)
Lab: ABNORMAL
M-SPIKE: ABNORMAL G/DL
PROT PATTERN SERPL ELPH-IMP: ABNORMAL
PROT SERPL-MCNC: 7 G/DL (ref 6–8.5)

## 2018-10-22 NOTE — TELEPHONE ENCOUNTER
Patient goes tomorrow for his repeat labs,  So he wants to clarify to take the Bumex, Carvedilol and Entresto in the PM

## 2018-10-22 NOTE — TELEPHONE ENCOUNTER
Patient calling stating his BP yest ran around 90/63, 95/70, 99/65 pulse 72-78  He took his medication Yest.   BUmex 2mg 1 po bid, Carvedilol 3.125mg 1 bid and Entresto 24/26 1 bid  He states he feels tired no other symptoms  Today when he got up his BP was 83/58 pulse 76 and then around 10:00 88/68  He has not taken any of his medication yet,     [phone]

## 2018-10-22 NOTE — TELEPHONE ENCOUNTER
I called him back and he said he only took the Bumex once yest. The other two medication he took twice.

## 2018-10-23 ENCOUNTER — LAB (OUTPATIENT)
Dept: LAB | Facility: HOSPITAL | Age: 63
End: 2018-10-23

## 2018-10-23 DIAGNOSIS — I50.22 CHF (CONGESTIVE HEART FAILURE), NYHA CLASS III, CHRONIC, SYSTOLIC (HCC): Primary | ICD-10-CM

## 2018-10-23 DIAGNOSIS — I27.20 PULMONARY HYPERTENSION (HCC): ICD-10-CM

## 2018-10-23 LAB
ANION GAP SERPL CALCULATED.3IONS-SCNC: 14.4 MMOL/L
BUN BLD-MCNC: 34 MG/DL (ref 8–23)
BUN/CREAT SERPL: 24.8 (ref 7–25)
CALCIUM SPEC-SCNC: 9.5 MG/DL (ref 8.6–10.5)
CHLORIDE SERPL-SCNC: 90 MMOL/L (ref 98–107)
CO2 SERPL-SCNC: 29.6 MMOL/L (ref 22–29)
CREAT BLD-MCNC: 1.37 MG/DL (ref 0.76–1.27)
GFR SERPL CREATININE-BSD FRML MDRD: 52 ML/MIN/1.73
GLUCOSE BLD-MCNC: 249 MG/DL (ref 65–99)
POTASSIUM BLD-SCNC: 4.4 MMOL/L (ref 3.5–5.2)
SODIUM BLD-SCNC: 134 MMOL/L (ref 136–145)

## 2018-10-23 PROCEDURE — 80048 BASIC METABOLIC PNL TOTAL CA: CPT

## 2018-10-23 PROCEDURE — 36415 COLL VENOUS BLD VENIPUNCTURE: CPT

## 2018-10-23 NOTE — TELEPHONE ENCOUNTER
I spoke with the patient.  His BUN is improving.  Creatinine is just slightly worse.  He will stay on daily Bumex and have repeat labs in a week

## 2018-10-23 NOTE — TELEPHONE ENCOUNTER
Pt called to say he went and got his labs here at [facility]. They have not resulted yet.    Tamiko

## 2018-10-24 ENCOUNTER — OFFICE VISIT (OUTPATIENT)
Dept: ONCOLOGY | Facility: CLINIC | Age: 63
End: 2018-10-24

## 2018-10-24 ENCOUNTER — APPOINTMENT (OUTPATIENT)
Dept: LAB | Facility: HOSPITAL | Age: 63
End: 2018-10-24

## 2018-10-24 ENCOUNTER — INFUSION (OUTPATIENT)
Dept: ONCOLOGY | Facility: HOSPITAL | Age: 63
End: 2018-10-24

## 2018-10-24 VITALS
DIASTOLIC BLOOD PRESSURE: 70 MMHG | OXYGEN SATURATION: 98 % | WEIGHT: 182.8 LBS | RESPIRATION RATE: 16 BRPM | HEART RATE: 78 BPM | SYSTOLIC BLOOD PRESSURE: 92 MMHG | HEIGHT: 71 IN | BODY MASS INDEX: 25.59 KG/M2 | TEMPERATURE: 98 F

## 2018-10-24 DIAGNOSIS — M81.8 OTHER OSTEOPOROSIS WITHOUT CURRENT PATHOLOGICAL FRACTURE: ICD-10-CM

## 2018-10-24 DIAGNOSIS — I50.41 ACUTE COMBINED SYSTOLIC AND DIASTOLIC CONGESTIVE HEART FAILURE (HCC): ICD-10-CM

## 2018-10-24 DIAGNOSIS — R79.9 ABNORMAL BLOOD CHEMISTRY: Primary | ICD-10-CM

## 2018-10-24 DIAGNOSIS — E85.9 AMYLOIDOSIS, UNSPECIFIED TYPE (HCC): ICD-10-CM

## 2018-10-24 DIAGNOSIS — R80.3 BENCE JONES PROTEIN: Primary | ICD-10-CM

## 2018-10-24 DIAGNOSIS — R80.3 BENCE JONES PROTEINURIA: Primary | ICD-10-CM

## 2018-10-24 DIAGNOSIS — E85.9 AMYLOIDOSIS, UNSPECIFIED TYPE (HCC): Primary | ICD-10-CM

## 2018-10-24 LAB
ALBUMIN SERPL-MCNC: 4.3 G/DL (ref 3.5–5.2)
ALBUMIN/GLOB SERPL: 1.8 G/DL (ref 1.1–2.4)
ALP SERPL-CCNC: 91 U/L (ref 38–116)
ALT SERPL W P-5'-P-CCNC: 34 U/L (ref 0–41)
ANION GAP SERPL CALCULATED.3IONS-SCNC: 13.5 MMOL/L
AST SERPL-CCNC: 27 U/L (ref 0–40)
BASOPHILS # BLD AUTO: 0.03 10*3/MM3 (ref 0–0.1)
BASOPHILS NFR BLD AUTO: 0.4 % (ref 0–1.1)
BILIRUB SERPL-MCNC: 1 MG/DL (ref 0.1–1.2)
BUN BLD-MCNC: 31 MG/DL (ref 6–20)
BUN/CREAT SERPL: 25 (ref 7.3–30)
CALCIUM SPEC-SCNC: 9.2 MG/DL (ref 8.5–10.2)
CHLORIDE SERPL-SCNC: 93 MMOL/L (ref 98–107)
CO2 SERPL-SCNC: 30.5 MMOL/L (ref 22–29)
CREAT BLD-MCNC: 1.24 MG/DL (ref 0.7–1.3)
DEPRECATED RDW RBC AUTO: 45.1 FL (ref 37–49)
EOSINOPHIL # BLD AUTO: 0.05 10*3/MM3 (ref 0–0.36)
EOSINOPHIL NFR BLD AUTO: 0.7 % (ref 1–5)
ERYTHROCYTE [DISTWIDTH] IN BLOOD BY AUTOMATED COUNT: 14.6 % (ref 11.7–14.5)
GFR SERPL CREATININE-BSD FRML MDRD: 59 ML/MIN/1.73
GLOBULIN UR ELPH-MCNC: 2.4 GM/DL (ref 1.8–3.5)
GLUCOSE BLD-MCNC: 140 MG/DL (ref 74–124)
HCT VFR BLD AUTO: 42.2 % (ref 40–49)
HGB BLD-MCNC: 14 G/DL (ref 13.5–16.5)
IMM GRANULOCYTES # BLD: 0.03 10*3/MM3 (ref 0–0.03)
IMM GRANULOCYTES NFR BLD: 0.4 % (ref 0–0.5)
LYMPHOCYTES # BLD AUTO: 1.13 10*3/MM3 (ref 1–3.5)
LYMPHOCYTES NFR BLD AUTO: 15.4 % (ref 20–49)
MCH RBC QN AUTO: 28.2 PG (ref 27–33)
MCHC RBC AUTO-ENTMCNC: 33.2 G/DL (ref 32–35)
MCV RBC AUTO: 85.1 FL (ref 83–97)
MONOCYTES # BLD AUTO: 0.74 10*3/MM3 (ref 0.25–0.8)
MONOCYTES NFR BLD AUTO: 10.1 % (ref 4–12)
NEUTROPHILS # BLD AUTO: 5.38 10*3/MM3 (ref 1.5–7)
NEUTROPHILS NFR BLD AUTO: 73 % (ref 39–75)
NRBC BLD MANUAL-RTO: 0 /100 WBC (ref 0–0)
PLATELET # BLD AUTO: 154 10*3/MM3 (ref 150–375)
PMV BLD AUTO: 10.6 FL (ref 8.9–12.1)
POTASSIUM BLD-SCNC: 4 MMOL/L (ref 3.5–4.7)
PROT SERPL-MCNC: 6.7 G/DL (ref 6.3–8)
RBC # BLD AUTO: 4.96 10*6/MM3 (ref 4.3–5.5)
SODIUM BLD-SCNC: 137 MMOL/L (ref 134–145)
WBC NRBC COR # BLD: 7.36 10*3/MM3 (ref 4–10)

## 2018-10-24 PROCEDURE — 96375 TX/PRO/DX INJ NEW DRUG ADDON: CPT

## 2018-10-24 PROCEDURE — 85025 COMPLETE CBC W/AUTO DIFF WBC: CPT | Performed by: INTERNAL MEDICINE

## 2018-10-24 PROCEDURE — 25010000002 MORPHINE SULFATE (PF) 2 MG/ML SOLUTION: Performed by: INTERNAL MEDICINE

## 2018-10-24 PROCEDURE — 99215 OFFICE O/P EST HI 40 MIN: CPT | Performed by: INTERNAL MEDICINE

## 2018-10-24 PROCEDURE — 80053 COMPREHEN METABOLIC PANEL: CPT | Performed by: INTERNAL MEDICINE

## 2018-10-24 PROCEDURE — 96374 THER/PROPH/DIAG INJ IV PUSH: CPT

## 2018-10-24 PROCEDURE — 25010000002 LORAZEPAM PER 2 MG: Performed by: INTERNAL MEDICINE

## 2018-10-24 PROCEDURE — 38220 DX BONE MARROW ASPIRATIONS: CPT | Performed by: INTERNAL MEDICINE

## 2018-10-24 PROCEDURE — 36415 COLL VENOUS BLD VENIPUNCTURE: CPT | Performed by: INTERNAL MEDICINE

## 2018-10-24 RX ORDER — LORAZEPAM 2 MG/ML
1 INJECTION INTRAMUSCULAR ONCE
Status: CANCELLED | OUTPATIENT
Start: 2018-10-24

## 2018-10-24 RX ORDER — LORAZEPAM 2 MG/ML
1 INJECTION INTRAMUSCULAR ONCE
Status: COMPLETED | OUTPATIENT
Start: 2018-10-24 | End: 2018-10-24

## 2018-10-24 RX ORDER — MORPHINE SULFATE 2 MG/ML
1 INJECTION, SOLUTION INTRAMUSCULAR; INTRAVENOUS ONCE
Status: CANCELLED | OUTPATIENT
Start: 2018-10-24

## 2018-10-24 RX ORDER — MORPHINE SULFATE 2 MG/ML
1 INJECTION, SOLUTION INTRAMUSCULAR; INTRAVENOUS ONCE
Status: COMPLETED | OUTPATIENT
Start: 2018-10-24 | End: 2018-10-24

## 2018-10-24 RX ADMIN — LORAZEPAM 1 MG: 2 INJECTION INTRAMUSCULAR; INTRAVENOUS at 13:54

## 2018-10-24 RX ADMIN — MORPHINE SULFATE 1 MG: 2 INJECTION, SOLUTION INTRAMUSCULAR; INTRAVENOUS at 13:55

## 2018-10-24 NOTE — PROGRESS NOTES
Subjective   M PROTEIN IN  URINE, NORMAL CBC CMP CALCIUM, BRIGHT HEART ON ECHOCARDIOGRAM, LEFT VENTRICULAR DYSFUNCTION.CHF, BENCE LEMA PROTEINURIA, EXTREMELY SOFT BONES IN BONE MARROW TEST INDICATOR OF OSTEOPOROSIS.      History of Present Illness  I have been asked to see this patient in consultation today because he has been admitted to the hospital in order to further investigate why he has developed progressive congestive heart failure and left ventricular dysfunction. He has had a cardiac catheterization this morning, result of which is not available. The patient has had diabetes mellitus at least for 20 years with a hemoglobin A1c around 7 and he does not take care of the diabetes correctly. He has not had the development of complications of diabetes like peripheral neuropathy, nephropathy or retinopathy as far as he knows. In any event, Dr. Renae, his primary internal medicine physician, has obtained recently a urine specimen in random fashion for monoclonal protein that turned out to be positive for Bence-Lema protein. Also peripheral blood was obtained for serum protein electrophoresis that disclosed a monoclonal protein. This could not be quantitated because the amount was very small but the amount of IgG, IgA and IgM were reduced. In the background of myocardial dysfunction and this analysis of monoclonal protein in blood and urine raises the question if the patient has myeloma, lymphoma or amyloidosis.      The patient physically feels fatigued and tired but he is able to carry his job. He has shortness of breath upon exercise but he has no angina or palpitations. He has not had any paroxysmal nocturnal dyspnea or orthopnea. He has had a cold for the last 2 weeks with significant chest congestion, cough, sputum production and some element of wheezing and shortness of breath. His appetite has been stable. His weight is stable but the weight has been fluctuating a lot depending on the volume  "status with a lot of swelling in the lower extremities that comes and goes depending on the medicine that he takes. The patient has not had any fevers, chills, night sweats or pruritus. He has not had any abnormal bleeding, neither skeletal pain, neither peripheral neuropathy. He has not had any lesions in the skin. His bowel activity has been normal. He has no abdominal pain or distention. No jaundice. He has no heartburn, no indigestion. Normal bowel activity. Urination is ongoing with large volumes. No hematuria. He has not had any other neurological symptomatology. He has no joint pain. He has not had any other symptoms  The patient returned to the office in company of his son on 10/24/2018 still complaining of some fatigue but overall no other new issues. He has been scheduled by Dr. Deleon to be seen at the pulmonary hypertension clinic at [facility] at some point in the 2nd week in November. The patient is not having any swelling, his appetite is appropriate, he has no fevers, no chills, no skeletal pain, no peripheral neuropathy. The patient is not having any other new heart issues since the previous visit in the hospital a couple of weeks ago. 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 Actually he has very significant proteinuria a total of 1800 mg in 24 hours, 1200 of these are Bence-Lema protein. Given this fact the patient will need to proceed with bone marrow testing today.       Past Medical History   Past Medical History:   Diagnosis Date   • Chronic diastolic heart failure (CMS/HCC) 9/11/2018   • Diabetes mellitus (CMS/HCC)    • Hyperlipidemia    • Hypertension    • Seizure (CMS/HCC)     'possible, passed out and not sure why\"     Social History     Social History   • Marital status:      Spouse name: Karuna   • Number of children: N/A   • Years of education: N/A "     Occupational History   •  [employer]     Social History Main Topics   • Smoking status: Never Smoker   • Smokeless tobacco: Never Used   • Alcohol use No   • Drug use: No   • Sexual activity: Defer     Other Topics Concern   • Not on file     Social History Narrative   • No narrative on file     Family History   Problem Relation Age of Onset   • Heart disease Father    • No Known Problems Mother        Review of Systems     General: no fever, no chills, no fatigue,no weight changes, no lack of appetite.  Eyes: no epiphora, xerophthalmia,conjunctivitis, pain, glaucoma, blurred vision, blindness, secretion, photophobia, proptosis, diplopia.  Ears: no otorrhea, tinnitus, otorrhagia, deafness, pain, vertigo.  Nose: no rhinorrhea, no epistaxis, no alteration in perception of odors, no sinuses pressure.  Mouth: no alteration in gums or teeth,  No ulcers, no difficulty with mastication or deglut ion, no odynophagia.  Neck: no masses or pain, no thyroid alterations, no pain in muscles or arteries, no carotid odynia, no crepitation.  Respiratory: no cough, no sputum production,no dyspnea,no trepopnea, no pleuritic pain,no hemoptysis.  Heart: no syncope, no irregularity, no palpitations, no angina,no orthopnea,no paroxysmal nocturnal dyspnea.  Vascular Venous: no tenderness,no edema,no palpable cords,no postphlebitic syndrome, no skin changes no ulcerations.  Vascular Arterial: no distal ischemia, noclaudication, no gangrene, no neuropathic ischemic pain, no skin ulcers, no paleness no cyanosis.  GI: no dysphagia, no odynophagia, no regurgitation, no heartburn,no indigestion,no nausea,no vomiting,no hematemesis ,no melena,no jaundice,no distention, no obstipation,no enterorrhagia,no proctalgia,no anal  lesions, no changes in bowel habits.  : no frequency, no hesitancy, no hematuria, no discharge,no  pain.  Musculoskeletal: no muscle or tendon pain or inflammation,no  joint pain, no edema, no functional limitation,no  "fasciculations, no mass.  Neurologic: no headache, no seizures, noalterations on Craneal nerves, no motor deficit, no sensory deficit, normal coordination, no alteration in memory,normal orientation, calculation,normal writting, verbal and written language.  Skin: no rashes,no pruritus no localized lesions.  Psychiatric: no anxiety, no depression,no agitation, no delusions, proper insight.  Medications:  The current medication list was reviewed in the EMR    ALLERGIES:  No Known Allergies    Objective      Vitals:    10/24/18 1305   BP: 92/70   Pulse: 78   Resp: 16   Temp: 98 °F (36.7 °C)   TempSrc: Oral   SpO2: 98%   Weight: 82.9 kg (182 lb 12.8 oz)   Height: 180.3 cm (70.98\")   PainSc: 0-No pain     Current Status 10/24/2018   ECOG score 0       Physical Exam   GENERAL:  Well-developed, well-nourished  Patient  in no acute distress.   SKIN:  Warm, dry ,NO rashes,NO purpura ,NO petechiae.  HEENT:  Pupils were equal and reactive to light and accomodation, conjunctivas non injected, no pterigion, normal extraocular movements, normal visual acuity.   Mouth mucosa was moist, no exudates in oropharynx, normal gum line, normal roof of the mouth and pillars, normal papillations of the tongue.  NECK:  Supple with good range of motion; no thyromegaly or masses, no JVD or bruits, no cervical adenopathies.No carotid arteries pain, no carotid abnormal pulsation , NO arterial dance.  LYMPHATICS:  No cervical, NO supraclavicular, NO axillary,NO epitrochlear , NO inguinal adenopathy.  CHEST:  Normal excursion of both clare thoraces, normal voice fremitus, no subcutaneous emphysema, normal axillas, no rashes or acanthosis nigricans. Lungs clear to percussion and auscultation, normal breath sounds bilaterally, no wheezing,NO crackles NO ronchi, NO stridor, NO rubs.  CARDIAC AND VASCULAR:  normal rate and regular rhythm, without murmurs,NO rubs NO S3 NO S4 right or left . Normal femoral, popliteal, pedis, brachial and carotid " pulses.  ABDOMEN:  Soft, nontender with no organomegaly or masses, no ascites, no collateral circulation,no distention,no [place] sign, no abdominal pain, no inguinal hernias,no umbilical hernia, no abdominal bruits. Normal bowel sounds.  GENITAL: Not  Performed.  EXTREMITIES  AND SPINE:  No clubbing, cyanosis or edema, no deformities or pain .No kyphosis, scoliosis, deformities or pain in spine, ribs or pelvic bone.  NEUROLOGICAL:  Patient was awake, alert, oriented to time, person and place.        RECENT LABS:  Hematology WBC   Date Value Ref Range Status   10/24/2018 7.36 4.00 - 10.00 10*3/mm3 Final     RBC   Date Value Ref Range Status   10/24/2018 4.96 4.30 - 5.50 10*6/mm3 Final     Hemoglobin   Date Value Ref Range Status   10/24/2018 14.0 13.5 - 16.5 g/dL Final     Hematocrit   Date Value Ref Range Status   10/24/2018 42.2 40.0 - 49.0 % Final     Platelets   Date Value Ref Range Status   10/24/2018 154 150 - 375 10*3/mm3 Final      BONE SURVEY COMPLETE     CLINICAL HISTORY: Monoclonal gammopathy. Possible multiple myeloma.     A standard bone survey was obtained. No discrete lytic or blastic  lesions are identified. There are no fractures. Mild to moderate  degenerative disc changes are noted throughout the spine, most prominent  in the thoracic region.     IMPRESSIONS: No lytic lesions are identified.     This report was finalized on 10/10/2018 6:56 PM by Dr. Ben Albright M.D.  CT CHEST WO CONTRAST-, CT ABDOMEN PELVIS WO CONTRAST-     CLINICAL HISTORY: 63-year-old male with monoclonal gammopathy. Possible  multiple myeloma. Progressively worsening dyspnea over the past year.     TECHNIQUE: Spiral CT images were obtained through the chest abdomen and  pelvis with IV contrast and were reconstructed in 3 mm thick slices.     Radiation dose reduction techniques were utilized, including automated  exposure control and exposure modulation based on body size.     COMPARISON: None     FINDINGS: There is no  mediastinal or hilar or axillary lymphadenopathy.  Lung window images demonstrate focal reticulonodular infiltrate in the  superior segment of the left lower lobe consistent with a small area of  pneumonia. No areas of dense consolidation are identified. There are no  discrete lung masses. There are no pleural effusions. The liver and  spleen and pancreas and kidneys and adrenal glands are unremarkable.  Multiple gallstones fill the gallbladder lumen. The gallbladder is  otherwise unremarkable. There is no bile duct dilatation. There is a  small amount of ascites adjacent to the liver and spleen. The stomach  and small and large bowel are unremarkable. There is no lymphadenopathy  in the abdomen or pelvis.     Bone window images demonstrate no lytic or blastic lesions.     IMPRESSION:  Minimal focal reticulonodular infiltrate in the superior  segment of the left lower lobe consistent with pneumonia..  Cholelithiasis. Minimal ascites. Otherwise unremarkable CT scan of the  chest abdomen and pelvis. No osseous lytic lesions are identified.     This report was finalized on 10/10/2018 6:54 PM by Dr. Ben Albright M.D.  Beta 2 Microglobulin, Serum   Order: [id number]   Status:  Final result   Visible to patient:  No (Not Released)    Ref Range & Units 1d ago   Beta-2 Microglobulin 0.8 - 2.2 mg/L 3.4     Resulting Agency  [facility]      Specimen Collected: 10/10/18 14:58 Last Resulted: 10/10/18 16:15            Lactate Dehydrogenase   Order: [id number]   Status:  Final result   Visible to patient:  No (Not Released)   Newer results are available. Click to view them now.    Ref Range & Units 1d ago    - 225 U/L 228     Resulting Agency  [facility]      Specimen Collected: 10/10/18 14:58 Last Resulted: 10/10/18             -reactive Protein   Order: [id number]   Status:  Final result   Visible to patient:  No (Not Released)    Ref Range & Units 1d ago   C-Reactive Protein 0.00 - 0.50 mg/dL 1.86     Resulting Agency   [facility]      Specimen Collected: 10/10/18 14:58 Last Resulted: 10/10/18 16:15              Sedimentation Rate   Order: [id number]   Status:  Final result   Visible to patient:  No (Not Released)    Ref Range & Units 1d ago   Sed Rate 0 - 20 mm/hr 12    Resulting Agency  [facility]      Specimen Collected: 10/10/18 14:58 Last Resulted: 10/10/18 16:19              GARY+Protein Electro, 24-Hr Urine -   Order: [id number]   Status:  Final result   Visible to patient:  Yes (MyChart)    Ref Range & Units 13d ago   Total Protein, Urine Not Estab. mg/dL 37.5    Protein, 24H Urine 30 - 150 mg/24 hr 1875     Albumin, U % 15.2    Alpha-1-Globulin, U % 1.3    Alpha-2-Globulin, U % 4.7    Beta Globulin, U % 74.6    Gamma Globulin, Urine % 4.2    M-Memo, % U Not Observed % 64.2     M-Memo, mg/24 hr Not Observed mg/24 hr 1204     Immunofixation Result, Urine  Comment    Comment: Bence Lema Protein positive; lambda type.   Note:  Comment    Comment: Protein electrophoresis scan will follow via computer, mail, or    delivery.   Resulting Agency  [facility]   Narrative     Performed at:  [address] [facility]  [id number]  : Jose Gil PhD, Phone:  [phone]                Protein Elec + Interp, Serum   Order: [id number]   Status:  Final result   Visible to patient:  Yes (MyChart) Dx:  LVH (left ventricular hypertrophy)   Newer results are available. Click to view them now.    Ref Range & Units 5d ago   Total Protein 6.0 - 8.5 g/dL 7.0    Albumin 2.9 - 4.4 g/dL 3.7    Alpha-1-Globulin 0.0 - 0.4 g/dL 0.4    Alpha-2-Globulin 0.4 - 1.0 g/dL 1.1     Beta Globulin 0.7 - 1.3 g/dL 1.2    Gamma Globulin 0.4 - 1.8 g/dL 0.7    M-Memo Not Observed g/dL Not Observed    Globulin 2.2 - 3.9 g/dL 3.3    A/G Ratio 0.7 - 1.7 1.1    Please note  Comment    Comment: Protein electrophoresis scan will follow via computer, mail, or    delivery.   SPE Interpretation  Comment    Comment: The SPE  pattern is suggestive of a subacute inflammatory response.   This condition represents an intermediate stage between two possible   courses for acute inflammation: total convalescense with a return   to normal, or the onset of a chronic inflammatory condition               Assessment/Plan  In summary, this patient who is 63 has been having progressive amount of shortness of breath since 12/2017 and recent assessment has documented progressive worsening of left ventricular function. The patient has not had any history of coronary disease and has raised the question if the recent so-called viral infection has produced some element of viral myocarditis. He has had a cardiac catheterization this morning. The report is not available in Epic yet. On the other hand, the patient has been found to have a monoclonal protein in a random urine specimen and also minimal monoclonal protein not measurable in serum protein electrophoresis. This raises the question where we are. The patient has hypogammaglobulinemia, normal calcium, normal creatinine. The differential diagnosis of the condition that he could have with these monoclonal protein and if we put together some myocardial dysfunction most likely will point out into amyloidosis. On the other hand, it is impossible to rule out the possibility of multiple myeloma or lymphoma producing monoclonal proteins.   The patient was further reviewed on 10/24/2018 with no other major symptomatology. The patient’s MRI of the heart disclosed no major alterations and Dr. Deleon, the patient’s Cardiologist, has scheduled the patient to be seen in [facility] in the 2nd week in November.     From my point of view, I analyzed the peripheral blood serum protein electrophoresis that failed to document monoclonal protein in this occasion. This is kind of contradictory in regard to the original one that showed a minimal monoclonal protein. I think this test needs to be  repeated. What is very concerning though is the analysis of protein electrophoresis and immunoelectrophoresis in unit of 24 hours that unequivocally shows a Bence-Lema proteinuria. The total amount of protein in 24 hours is close to 1800 mg and from this 1200 are monoclonal protein proteins. Therefore, under these circumstances I do believe that the patient needs to proceed with further testing to rule out the possibility of multiple myeloma or amyloidosis or lymphoma producing this kind of problems. Obviously, under the present circumstances and knowing that he has an element of myocardial dysfunction, I think eventually the patient will require as well an endomyocardial biopsy. Also I do believe that the testing that we are going to propose to the patient in the next few minutes that is bone marrow testing does not provide us a definitive diagnosis in regard to amyloid, he will require an abdominal fat pad biopsy as well.     I discussed all these facts with the patient and his son today and I provided the reports of the laboratory testing. I discussed the need for him to proceed with bone marrow testing. He wants to go ahead and do it today. I went ahead and advised the patient to proceed with this. He signed a consent giving him alternatives. He is not taking any anticoagulants. We prepped him with 1 mg of Ativan IV and 1 mg of morphine IV. Thereafter the patient was approached in the right posterior pelvic bone giving him a total of 8 mL of 1% lidocaine without epinephrine. It caught my attention the dramatic softness of his bones to the point that was tremendously easy for me to place the needle inside of the bone marrow cavity. I was even afraid of going through the whole pelvic bone with almost no effort. I took an excellent sample for aspiration. I did not take any biopsy because I was afraid of doing any damage. The patient did not have any significant clinical bleeding. The total duration of the  procedure was no more than 15 minutes. He tolerated very well. We advised him to go home, take some Tylenol 650 mg p.o. in a couple of hours and it will be okay for him to take a shower and walk and do anything that he pleases for the next few days.     Given the findings in the bone marrow analysis in regard to the softness of the bone, I went ahead and scheduled the patient to have a bone density test. He will bring back the report of this when he returns in a couple of weeks to review the pathological analysis of the bone marrow. In the bone marrow analysis, we have requested flow cytometry, chromosomal analysis morphology as well as [place] red stains looking for amyloidosis.                           10/24/2018      CC:

## 2018-10-24 NOTE — PROGRESS NOTES
Order placed for pt to have Bone marrow Bx today in office.  Pt is to get Ativan 1mg IV and Morphine 1mg IV per Dr Canales  For Dx of amyloidosis  Order placed for DEXA scan per Dr Canales with Dx osteoporosis and amyloidosis

## 2018-10-25 ENCOUNTER — HOSPITAL ENCOUNTER (OUTPATIENT)
Dept: BONE DENSITY | Facility: HOSPITAL | Age: 63
End: 2018-10-25
Attending: INTERNAL MEDICINE

## 2018-10-25 ENCOUNTER — OFFICE VISIT (OUTPATIENT)
Dept: CARDIOLOGY | Facility: CLINIC | Age: 63
End: 2018-10-25

## 2018-10-25 VITALS
OXYGEN SATURATION: 100 % | HEIGHT: 70 IN | DIASTOLIC BLOOD PRESSURE: 62 MMHG | BODY MASS INDEX: 26.2 KG/M2 | HEART RATE: 74 BPM | SYSTOLIC BLOOD PRESSURE: 98 MMHG | WEIGHT: 183 LBS

## 2018-10-25 DIAGNOSIS — I27.20 PULMONARY HYPERTENSION (HCC): ICD-10-CM

## 2018-10-25 DIAGNOSIS — I51.7 LVH (LEFT VENTRICULAR HYPERTROPHY): Primary | ICD-10-CM

## 2018-10-25 DIAGNOSIS — I50.41 ACUTE COMBINED SYSTOLIC AND DIASTOLIC CONGESTIVE HEART FAILURE (HCC): ICD-10-CM

## 2018-10-25 PROCEDURE — 93000 ELECTROCARDIOGRAM COMPLETE: CPT | Performed by: PHYSICIAN ASSISTANT

## 2018-10-25 PROCEDURE — 99214 OFFICE O/P EST MOD 30 MIN: CPT | Performed by: PHYSICIAN ASSISTANT

## 2018-10-25 NOTE — PROGRESS NOTES
Date of Office Visit: 10/25/2018  Encounter Provider: VIRGIL Orozco  Place of Service: [facility]  Patient Name: Billy Bourne  :1955    Chief Complaint   Patient presents with   • Congestive Heart Failure     1 year follow up   :     HPI: Billy Bourne is a 63 y.o. male, new to me, who presents today for follow-up.  Old records have been obtained and reviewed by me.  He is a patient of Dr. Early with a past medical history significant for diabetes, hypertension, moderate LVH, and diastolic dysfunction.  He initially presented to his PCP with complaints of lower extremity edema and dyspnea on exertion.  He was started on an Ace inhibitor and an echocardiogram was ordered.  This confirmed his moderate LVH, however his previously normal ejection fraction was now down to 40%, and his RVSP was markedly elevated at 67 mmHg.  There was an echo bright appearance of the myocardium that was suspicious for amyloid, and he was scheduled for an outpatient left and right heart catheterization.  He initially came in for elective cardiac catheterization which showed normal coronary arteries.  However his LVEDP was elevated at 32 mmHg, and his wedge and right atrial pressures were severely elevated as well.  In the setting of his severe pulmonary hypertension, he was admitted for diuresis, hematology consult for possible amyloid, and pulmonary evaluation.  He was diuresed and then taken back 5 days later for a repeat right heart catheter.  His pulmonary pressures were still severely elevated and he was transitioned to Bumex and given a one-time dose of metolazone.  He diuresed about 8 L after this.  He was then transitioned to oral Bumex.  His hematological evaluation for amyloid was kind of unremarkable.  A cardiac MRI showed normal perfusion of his myocardium, and normal biatrial size.  Ultimately it was felt that he would benefit from seeing the heart failure team and the  "pulmonary hypertension team at [place].  He was discharged home and is here today for follow-up.  Have labs checked yesterday, his CMP was stable.    Since she's been home he's been doing well.  He is finally down to 178 pounds on his home scale and has stayed steady with this.  His breathing is significantly improved, he has no more lower extremity edema.  He has been having some episodes of hypotension, and he is trying to spread his medications out throughout the day so that he can take everything that he needs to without dropping too low with his blood pressure.  There are days when he is only able to get in one dose of carvedilol.  He has been able to take the Entresto every day.  He is going to be seeing the cardiology team at [place] on 11/5/2018.  He is scheduled for a sleep study next week.    Past Medical History:   Diagnosis Date   • Chronic diastolic heart failure (CMS/HCC) 9/11/2018   • Diabetes mellitus (CMS/HCC)    • Hyperlipidemia    • Hypertension    • Seizure (CMS/HCC)     'possible, passed out and not sure why\"       Past Surgical History:   Procedure Laterality Date   • CARDIAC CATHETERIZATION N/A 10/10/2018    Procedure: Right and Left Heart Cath;  Surgeon: Wm Deleon MD;  Location:  [facility];  Service: Cardiology   • CARDIAC CATHETERIZATION N/A 10/10/2018    Procedure: Coronary angiography;  Surgeon: Wm Deleon MD;  Location:  [facility];  Service: Cardiology   • CARDIAC CATHETERIZATION N/A 10/10/2018    Procedure: Left ventriculography;  Surgeon: Wm Deleon MD;  Location:  [facility];  Service: Cardiology   • CARDIAC CATHETERIZATION N/A 10/15/2018    Procedure: Right Heart Cath, vasodilator study if pressures are elevated;  Surgeon: Maryam Juan MD;  Location:  [facility];  Service: Cardiovascular   • VASECTOMY         Social History     Social History   • Marital status: "      Spouse name: Karuna   • Number of children: N/A   • Years of education: N/A     Occupational History   •  [employer]     Social History Main Topics   • Smoking status: Never Smoker   • Smokeless tobacco: Never Used   • Alcohol use No   • Drug use: No   • Sexual activity: Defer     Other Topics Concern   • Not on file     Social History Narrative   • No narrative on file       Family History   Problem Relation Age of Onset   • Heart disease Father    • No Known Problems Mother        Review of Systems   Constitution: Positive for malaise/fatigue. Negative for chills and fever.   Cardiovascular: Negative for chest pain, dyspnea on exertion, leg swelling, near-syncope, orthopnea, palpitations, paroxysmal nocturnal dyspnea and syncope.   Respiratory: Negative for cough and shortness of breath.    Musculoskeletal: Negative for joint pain, joint swelling and myalgias.   Gastrointestinal: Negative for abdominal pain, diarrhea, melena, nausea and vomiting.   Genitourinary: Negative for frequency and hematuria.   Neurological: Positive for light-headedness. Negative for numbness, paresthesias and seizures.   Allergic/Immunologic: Negative.    All other systems reviewed and are negative.      No Known Allergies      Current Outpatient Prescriptions:   •  atorvastatin (LIPITOR) 20 MG tablet, Take 20 mg by mouth Daily., Disp: , Rfl:   •  bumetanide (BUMEX) 2 MG tablet, Take 1 tablet by mouth 2 (Two) Times a Day., Disp: 60 tablet, Rfl: 6  •  carvedilol (COREG) 3.125 MG tablet, Take 1 tablet by mouth 2 (Two) Times a Day., Disp: 60 tablet, Rfl: 11  •  fluticasone (FLONASE) 50 MCG/ACT nasal spray, 2 sprays into the nostril(s) as directed by provider Daily., Disp: , Rfl:   •  glimepiride (AMARYL) 2 MG tablet, Take 2 mg by mouth Every Morning Before Breakfast., Disp: , Rfl:   •  loratadine (CLARITIN) 10 MG tablet, Take 10 mg by mouth Daily., Disp: , Rfl:   •  sacubitril-valsartan (ENTRESTO) 24-26 MG tablet, Take 1 tablet by  "mouth Every 12 (Twelve) Hours., Disp: 60 tablet, Rfl: 6  •  SYNJARDY XR 12.5-1000 MG tablet sustained-release 24 hour, Take 2 tablets by mouth Daily., Disp: , Rfl:   •  zolpidem (AMBIEN) 5 MG tablet, Bring to sleep lab DO NOT use at home. PLEASE take if not asleep within 30 mins of start of study., Disp: 2 tablet, Rfl: 0      Objective:     Vitals:    10/25/18 1535 10/25/18 1539   BP: 92/58 98/62   BP Location: Right arm Left arm   Pulse: 74    SpO2: 100%    Weight: 83 kg (183 lb)    Height: 177.8 cm (70\")      Body mass index is 26.26 kg/m².    PHYSICAL EXAM:    Physical Exam   Constitutional: He is oriented to person, place, and time. He appears well-developed and well-nourished. No distress.   HENT:   Head: Normocephalic and atraumatic.   Eyes: Pupils are equal, round, and reactive to light.   Neck: No JVD present. No thyromegaly present.   Cardiovascular: Normal rate, regular rhythm and intact distal pulses.  Exam reveals gallop and S4.    No murmur heard.  Right radial cath site well healed without erythema or echymosis, palpable proximal and distal pulses, good capillary refill   Pulmonary/Chest: Effort normal and breath sounds normal. No respiratory distress. He has no rales.   Abdominal: Soft. Bowel sounds are normal. He exhibits no distension and no ascites. There is no splenomegaly or hepatomegaly. There is no tenderness.   Musculoskeletal: Normal range of motion. He exhibits no edema.   Neurological: He is alert and oriented to person, place, and time.   Skin: Skin is warm and dry. He is not diaphoretic. No erythema.   Psychiatric: He has a normal mood and affect. His behavior is normal. Judgment normal.         ECG 12 Lead  Date/Time: 10/25/2018 3:51 PM  Performed by: CARLA DERAS  Authorized by: CARLA DERAS   Comparison: compared with previous ECG from 10/11/2018  Similar to previous ECG  Rhythm: sinus rhythm  BPM: 74  Other findings: LVH  Clinical impression: abnormal ECG  Comments: " Indication: LVH, diastolic heart failure.              Assessment:       Diagnosis Plan   1. LVH (left ventricular hypertrophy)  ECG 12 Lead   2. Pulmonary hypertension (CMS/HCC)  ECG 12 Lead   3. Acute combined systolic and diastolic congestive heart failure (CMS/HCC)  ECG 12 Lead     Orders Placed This Encounter   Procedures   • ECG 12 Lead     This order was created via procedure documentation          Plan:       1.  Heart Failure  Assessment  • NYHA class I - There is no limitation of physical activity. Physical activity does not cause fatigue, palpitations or shortness of breath.  • Beta blocker prescribed  • Diuretics prescribed  • Angiotensin receptor blocker (ARB) prescribed  • The most recent ejection fraction is 40%  • Left ventricular function is mildly reduced by qualitative assessment    Plan  • The patient has received heart failure education on the following topics: dietary sodium restriction, medication instructions, physical activity and weight monitoring  • The heart failure care plan was discussed with the patient today including: continuing the current program    Subjective/Objective  • Physical exam findings positive for S4 gallop.   • Physical exam findings negative for rales, peripheral edema, elevated JVP, hepatomegaly and ascites.  • Overall he's doing markedly better than he was when he was in the hospital.  He is trying to get as many of his heart failure medications and daily as possible without hypotension.  So far he has been pretty successful with this.  He is scheduled to see the cardiology team at [place] on 11/5/2018.    2.  Pulmonary hypertension.  Dr. Deleon wanted him to also see the pulmonary hypertension team at [place], he does not have an appointment yet.  We are going to try to arrange this so that it happens at the same time as his cardiology appointment.  Overall his breathing is much improved on his current medical regimen.    I'm not going to make any changes to  his plan today, and he will follow-up with Dr. Deleon on 11/30/2018 or sooner if needed.    As always, it has been a pleasure to participate in your patient's care.      Sincerely,         Glenna Shoemaker PA-C

## 2018-10-27 ENCOUNTER — READMISSION MANAGEMENT (OUTPATIENT)
Dept: CALL CENTER | Facility: HOSPITAL | Age: 63
End: 2018-10-27

## 2018-10-27 NOTE — OUTREACH NOTE
CHF Week 2 Survey      Responses   Facility patient discharged from?  [facility]   Does the patient have one of the following disease processes/diagnoses(primary or secondary)?  CHF   Week 2 attempt successful?  Yes   Call start time  1401   Call end time  1404   Discharge diagnosis  Combined CHF -heart cath this visit   Is patient permission given to speak with other caregiver?  Yes   List who call center can speak with  wife   Meds reviewed with patient/caregiver?  Yes   Is the patient having any side effects they believe may be caused by any medication additions or changes?  No   Does the patient have all medications ordered at discharge?  Yes   Is the patient taking all medications as directed (includes completed medication regime)?  Yes   Medication comments  BUmex daily.    Has the patient kept scheduled appointments due by today?  Yes   Psychosocial issues?  No   Comments  BP has not changed per pt.   What is the patient's perception of their health status since discharge?  Improving   Nursing interventions  Nurse provided patient education   Is the patient weighing daily?  Yes   Is the patient able to teach back Heart Failure Zones?  Yes   Additional teach back comments  Pt is maintaining AURA diet and ADA diet.    CHF Week 2 call completed?  Yes          Kiara Solo RN

## 2018-10-30 ENCOUNTER — TELEPHONE (OUTPATIENT)
Dept: CARDIOLOGY | Facility: CLINIC | Age: 63
End: 2018-10-30

## 2018-10-30 ENCOUNTER — LAB (OUTPATIENT)
Dept: LAB | Facility: HOSPITAL | Age: 63
End: 2018-10-30

## 2018-10-30 DIAGNOSIS — I50.22 CHF (CONGESTIVE HEART FAILURE), NYHA CLASS III, CHRONIC, SYSTOLIC (HCC): ICD-10-CM

## 2018-10-30 LAB
ANION GAP SERPL CALCULATED.3IONS-SCNC: 12.9 MMOL/L
BUN BLD-MCNC: 27 MG/DL (ref 8–23)
BUN/CREAT SERPL: 20.9 (ref 7–25)
CALCIUM SPEC-SCNC: 9.7 MG/DL (ref 8.6–10.5)
CHLORIDE SERPL-SCNC: 95 MMOL/L (ref 98–107)
CO2 SERPL-SCNC: 28.1 MMOL/L (ref 22–29)
CREAT BLD-MCNC: 1.29 MG/DL (ref 0.76–1.27)
GFR SERPL CREATININE-BSD FRML MDRD: 56 ML/MIN/1.73
GLUCOSE BLD-MCNC: 101 MG/DL (ref 65–99)
POTASSIUM BLD-SCNC: 4 MMOL/L (ref 3.5–5.2)
SODIUM BLD-SCNC: 136 MMOL/L (ref 136–145)

## 2018-10-30 PROCEDURE — 36415 COLL VENOUS BLD VENIPUNCTURE: CPT

## 2018-10-30 PROCEDURE — 80048 BASIC METABOLIC PNL TOTAL CA: CPT

## 2018-10-30 NOTE — TELEPHONE ENCOUNTER
----- Message from Wm Deleon MD sent at 10/30/2018  2:17 PM EDT -----  I think these look a little bit better.  Please let him know to stay on the same medications

## 2018-10-31 ENCOUNTER — HOSPITAL ENCOUNTER (OUTPATIENT)
Dept: SLEEP MEDICINE | Facility: HOSPITAL | Age: 63
Discharge: HOME | End: 2018-10-31
Attending: INTERNAL MEDICINE | Admitting: INTERNAL MEDICINE

## 2018-10-31 DIAGNOSIS — G47.10 HYPERSOMNOLENCE: ICD-10-CM

## 2018-10-31 DIAGNOSIS — G47.34 NOCTURNAL HYPOXEMIA: ICD-10-CM

## 2018-10-31 DIAGNOSIS — G47.33 OSA (OBSTRUCTIVE SLEEP APNEA): ICD-10-CM

## 2018-10-31 PROCEDURE — 95810 POLYSOM 6/> YRS 4/> PARAM: CPT

## 2018-11-02 ENCOUNTER — TELEPHONE (OUTPATIENT)
Dept: ONCOLOGY | Facility: CLINIC | Age: 63
End: 2018-11-02

## 2018-11-02 ENCOUNTER — APPOINTMENT (OUTPATIENT)
Dept: LAB | Facility: HOSPITAL | Age: 63
End: 2018-11-02

## 2018-11-02 ENCOUNTER — APPOINTMENT (OUTPATIENT)
Dept: ONCOLOGY | Facility: CLINIC | Age: 63
End: 2018-11-02

## 2018-11-02 NOTE — TELEPHONE ENCOUNTER
Pt missed his vic here today he is going to [place] on Monday, I mention his bone marrow report is abnormal showing myeloma, vic made for Tuesday morning 8 am next week, he agrees

## 2018-11-05 ENCOUNTER — READMISSION MANAGEMENT (OUTPATIENT)
Dept: CALL CENTER | Facility: HOSPITAL | Age: 63
End: 2018-11-05

## 2018-11-05 ENCOUNTER — TELEPHONE (OUTPATIENT)
Dept: CARDIOLOGY | Facility: CLINIC | Age: 63
End: 2018-11-05

## 2018-11-05 NOTE — OUTREACH NOTE
CHF Week 3 Survey      Responses   Facility patient discharged from?  [facility]   Does the patient have one of the following disease processes/diagnoses(primary or secondary)?  CHF   Week 3 attempt successful?  No   Unsuccessful attempts  Attempt 1          Tha Hogan RN

## 2018-11-05 NOTE — TELEPHONE ENCOUNTER
Patients daughter Dionna was calling to let you know her Dad is going to [place] today.  Also wanted you to know that her Dad has myeloma, not sure if this could be related to his pulmonary hypertension and his heart trouble  [phone]

## 2018-11-05 NOTE — TELEPHONE ENCOUNTER
I spoke with the daughter. Myeloma. He will need endomyocardial biopsy   I will wait for appt with advanced HF at [facility]

## 2018-11-06 LAB — REF LAB TEST METHOD: NORMAL

## 2018-11-06 NOTE — TELEPHONE ENCOUNTER
I called his wife to let her know that we have his disability paperwork done. She said he was admitted at [facility] after his appt and they are going to do a heart cath. RORO.  The records are under Care Everywhere.    Tamiko

## 2018-11-07 ENCOUNTER — TELEPHONE (OUTPATIENT)
Dept: CARDIOLOGY | Facility: CLINIC | Age: 63
End: 2018-11-07

## 2018-11-07 NOTE — TELEPHONE ENCOUNTER
Pt's daughter Dionna called. She would like to speak with you about her dad. She would like to update you on what is going on as well.  She can be reached at #[phone].    Thanks,  Tamiko

## 2018-11-08 ENCOUNTER — READMISSION MANAGEMENT (OUTPATIENT)
Dept: CALL CENTER | Facility: HOSPITAL | Age: 63
End: 2018-11-08

## 2018-11-08 NOTE — OUTREACH NOTE
CHF Week 3 Survey      Responses   Facility patient discharged from?  [facility]   Does the patient have one of the following disease processes/diagnoses(primary or secondary)?  CHF   Week 3 attempt successful?  No   Revoke  Readmitted [pt has been admitted to [facility]]          Mandy Bellamy RN

## 2018-11-23 ENCOUNTER — TRANSCRIBE ORDERS (OUTPATIENT)
Dept: ADMINISTRATIVE | Facility: HOSPITAL | Age: 63
End: 2018-11-23

## 2018-11-23 DIAGNOSIS — R91.8 PULMONARY INFILTRATE: ICD-10-CM

## 2018-11-23 DIAGNOSIS — I27.20 PULMONARY HTN (HCC): Primary | ICD-10-CM

## 2018-12-13 ENCOUNTER — TELEPHONE (OUTPATIENT)
Dept: SLEEP MEDICINE | Facility: HOSPITAL | Age: 63
End: 2018-12-13

## 2019-03-15 LAB
BUN SERPL-MCNC: 46 MG/DL (ref 8–23)
BUN/CREAT SERPL: 31.3 (ref 7–25)
CALCIUM SERPL-MCNC: 9.3 MG/DL (ref 8.6–10.5)
CHLORIDE SERPL-SCNC: 90 MMOL/L (ref 98–107)
CO2 SERPL-SCNC: 31.7 MMOL/L (ref 22–29)
CREAT SERPL-MCNC: 1.47 MG/DL (ref 0.76–1.27)
GLUCOSE SERPL-MCNC: 158 MG/DL (ref 65–99)
POTASSIUM SERPL-SCNC: 3.8 MMOL/L (ref 3.5–5.2)
SODIUM SERPL-SCNC: 137 MMOL/L (ref 136–145)

## 2019-03-25 DIAGNOSIS — I50.32 CHRONIC DIASTOLIC HEART FAILURE (HCC): Primary | ICD-10-CM

## 2019-03-26 LAB
BUN SERPL-MCNC: 33 MG/DL (ref 8–23)
BUN/CREAT SERPL: 24.4 (ref 7–25)
CALCIUM SERPL-MCNC: 9.4 MG/DL (ref 8.6–10.5)
CHLORIDE SERPL-SCNC: 93 MMOL/L (ref 98–107)
CO2 SERPL-SCNC: 34.9 MMOL/L (ref 22–29)
CREAT SERPL-MCNC: 1.35 MG/DL (ref 0.76–1.27)
GLUCOSE SERPL-MCNC: 115 MG/DL (ref 65–99)
POTASSIUM SERPL-SCNC: 3.5 MMOL/L (ref 3.5–5.2)
SODIUM SERPL-SCNC: 142 MMOL/L (ref 136–145)

## 2019-04-06 ENCOUNTER — APPOINTMENT (OUTPATIENT)
Dept: CT IMAGING | Facility: HOSPITAL | Age: 64
End: 2019-04-06

## 2019-04-06 ENCOUNTER — HOSPITAL ENCOUNTER (OUTPATIENT)
Facility: HOSPITAL | Age: 64
Setting detail: OBSERVATION
Discharge: HOME | End: 2019-04-07
Attending: EMERGENCY MEDICINE | Admitting: EMERGENCY MEDICINE

## 2019-04-06 DIAGNOSIS — E87.6 HYPOKALEMIA: ICD-10-CM

## 2019-04-06 DIAGNOSIS — E16.2 HYPOGLYCEMIA: ICD-10-CM

## 2019-04-06 DIAGNOSIS — R55 SYNCOPE AND COLLAPSE: Primary | ICD-10-CM

## 2019-04-06 PROBLEM — N18.30 STAGE 3 CHRONIC KIDNEY DISEASE: Status: ACTIVE | Noted: 2019-04-06

## 2019-04-06 PROBLEM — E11.649 DIABETES MELLITUS WITH HYPOGLYCEMIA (HCC): Status: ACTIVE | Noted: 2017-09-05

## 2019-04-06 PROBLEM — I43 CARDIAC AMYLOIDOSIS: Status: ACTIVE | Noted: 2019-04-06

## 2019-04-06 PROBLEM — E85.4 CARDIAC AMYLOIDOSIS: Status: ACTIVE | Noted: 2019-04-06

## 2019-04-06 PROBLEM — C90.00 MULTIPLE MYELOMA WITHOUT REMISSION: Status: ACTIVE | Noted: 2019-04-06

## 2019-04-06 LAB
ALBUMIN SERPL-MCNC: 4.2 G/DL (ref 3.5–5.2)
ALBUMIN/GLOB SERPL: 1.8 G/DL
ALP SERPL-CCNC: 77 U/L (ref 39–117)
ALT SERPL W P-5'-P-CCNC: 34 U/L (ref 1–41)
ANION GAP SERPL CALCULATED.3IONS-SCNC: 18 MMOL/L
AST SERPL-CCNC: 30 U/L (ref 1–40)
BASOPHILS # BLD AUTO: 0.01 10*3/MM3 (ref 0–0.2)
BASOPHILS NFR BLD AUTO: 0.1 % (ref 0–1.5)
BILIRUB SERPL-MCNC: 1.3 MG/DL (ref 0.2–1.2)
BUN BLD-MCNC: 46 MG/DL (ref 8–23)
BUN/CREAT SERPL: 30.1 (ref 7–25)
CALCIUM SPEC-SCNC: 9.7 MG/DL (ref 8.6–10.5)
CHLORIDE SERPL-SCNC: 91 MMOL/L (ref 98–107)
CO2 SERPL-SCNC: 31 MMOL/L (ref 22–29)
CREAT BLD-MCNC: 1.53 MG/DL (ref 0.76–1.27)
DEPRECATED RDW RBC AUTO: 57.1 FL (ref 37–54)
EOSINOPHIL # BLD AUTO: 0.02 10*3/MM3 (ref 0–0.4)
EOSINOPHIL NFR BLD AUTO: 0.2 % (ref 0.3–6.2)
ERYTHROCYTE [DISTWIDTH] IN BLOOD BY AUTOMATED COUNT: 16 % (ref 12.3–15.4)
GFR SERPL CREATININE-BSD FRML MDRD: 46 ML/MIN/1.73
GLOBULIN UR ELPH-MCNC: 2.3 GM/DL
GLUCOSE BLD-MCNC: 72 MG/DL (ref 65–99)
GLUCOSE BLDC GLUCOMTR-MCNC: 125 MG/DL (ref 70–130)
GLUCOSE BLDC GLUCOMTR-MCNC: 164 MG/DL (ref 70–130)
GLUCOSE BLDC GLUCOMTR-MCNC: 64 MG/DL (ref 70–130)
HBA1C MFR BLD: 8.07 % (ref 4.8–5.6)
HCT VFR BLD AUTO: 34 % (ref 37.5–51)
HGB BLD-MCNC: 11.4 G/DL (ref 13–17.7)
IMM GRANULOCYTES # BLD AUTO: 0.1 10*3/MM3 (ref 0–0.05)
IMM GRANULOCYTES NFR BLD AUTO: 1.1 % (ref 0–0.5)
LYMPHOCYTES # BLD AUTO: 0.55 10*3/MM3 (ref 0.7–3.1)
LYMPHOCYTES NFR BLD AUTO: 6.3 % (ref 19.6–45.3)
MAGNESIUM SERPL-MCNC: 2.1 MG/DL (ref 1.6–2.4)
MCH RBC QN AUTO: 32 PG (ref 26.6–33)
MCHC RBC AUTO-ENTMCNC: 33.5 G/DL (ref 31.5–35.7)
MCV RBC AUTO: 95.5 FL (ref 79–97)
MONOCYTES # BLD AUTO: 0.68 10*3/MM3 (ref 0.1–0.9)
MONOCYTES NFR BLD AUTO: 7.7 % (ref 5–12)
NEUTROPHILS # BLD AUTO: 7.42 10*3/MM3 (ref 1.4–7)
NEUTROPHILS NFR BLD AUTO: 84.6 % (ref 42.7–76)
NRBC BLD AUTO-RTO: 0 /100 WBC (ref 0–0)
PLATELET # BLD AUTO: 148 10*3/MM3 (ref 140–450)
PMV BLD AUTO: 12 FL (ref 6–12)
POTASSIUM BLD-SCNC: 2.5 MMOL/L (ref 3.5–5.2)
PROT SERPL-MCNC: 6.5 G/DL (ref 6–8.5)
RBC # BLD AUTO: 3.56 10*6/MM3 (ref 4.14–5.8)
SODIUM BLD-SCNC: 140 MMOL/L (ref 136–145)
TROPONIN T SERPL-MCNC: 0.17 NG/ML (ref 0–0.03)
WBC NRBC COR # BLD: 8.78 10*3/MM3 (ref 3.4–10.8)

## 2019-04-06 PROCEDURE — G0378 HOSPITAL OBSERVATION PER HR: HCPCS

## 2019-04-06 PROCEDURE — 82962 GLUCOSE BLOOD TEST: CPT

## 2019-04-06 PROCEDURE — 96365 THER/PROPH/DIAG IV INF INIT: CPT

## 2019-04-06 PROCEDURE — 93005 ELECTROCARDIOGRAM TRACING: CPT | Performed by: EMERGENCY MEDICINE

## 2019-04-06 PROCEDURE — 96372 THER/PROPH/DIAG INJ SC/IM: CPT

## 2019-04-06 PROCEDURE — 96360 HYDRATION IV INFUSION INIT: CPT

## 2019-04-06 PROCEDURE — 84484 ASSAY OF TROPONIN QUANT: CPT | Performed by: EMERGENCY MEDICINE

## 2019-04-06 PROCEDURE — 80053 COMPREHEN METABOLIC PANEL: CPT | Performed by: EMERGENCY MEDICINE

## 2019-04-06 PROCEDURE — 99285 EMERGENCY DEPT VISIT HI MDM: CPT

## 2019-04-06 PROCEDURE — 93005 ELECTROCARDIOGRAM TRACING: CPT

## 2019-04-06 PROCEDURE — 85025 COMPLETE CBC W/AUTO DIFF WBC: CPT | Performed by: EMERGENCY MEDICINE

## 2019-04-06 PROCEDURE — 96374 THER/PROPH/DIAG INJ IV PUSH: CPT

## 2019-04-06 PROCEDURE — 96361 HYDRATE IV INFUSION ADD-ON: CPT

## 2019-04-06 PROCEDURE — 25010000002 ENOXAPARIN PER 10 MG: Performed by: INTERNAL MEDICINE

## 2019-04-06 PROCEDURE — 93010 ELECTROCARDIOGRAM REPORT: CPT | Performed by: INTERNAL MEDICINE

## 2019-04-06 PROCEDURE — 70450 CT HEAD/BRAIN W/O DYE: CPT

## 2019-04-06 PROCEDURE — 25010000003 POTASSIUM CHLORIDE 10 MEQ/100ML SOLUTION: Performed by: EMERGENCY MEDICINE

## 2019-04-06 PROCEDURE — 83735 ASSAY OF MAGNESIUM: CPT | Performed by: INTERNAL MEDICINE

## 2019-04-06 PROCEDURE — 83036 HEMOGLOBIN GLYCOSYLATED A1C: CPT | Performed by: INTERNAL MEDICINE

## 2019-04-06 RX ORDER — ONDANSETRON 4 MG/1
4 TABLET, FILM COATED ORAL EVERY 6 HOURS PRN
COMMUNITY

## 2019-04-06 RX ORDER — DOXYCYCLINE 100 MG/1
100 CAPSULE ORAL EVERY 12 HOURS SCHEDULED
Status: DISCONTINUED | OUTPATIENT
Start: 2019-04-06 | End: 2019-04-07 | Stop reason: HOSPADM

## 2019-04-06 RX ORDER — FLUTICASONE PROPIONATE 50 MCG
2 SPRAY, SUSPENSION (ML) NASAL DAILY
Status: DISCONTINUED | OUTPATIENT
Start: 2019-04-07 | End: 2019-04-07 | Stop reason: HOSPADM

## 2019-04-06 RX ORDER — POTASSIUM CHLORIDE 750 MG/1
40 CAPSULE, EXTENDED RELEASE ORAL AS NEEDED
Status: DISCONTINUED | OUTPATIENT
Start: 2019-04-06 | End: 2019-04-07 | Stop reason: HOSPADM

## 2019-04-06 RX ORDER — ONDANSETRON 2 MG/ML
4 INJECTION INTRAMUSCULAR; INTRAVENOUS EVERY 6 HOURS PRN
Status: DISCONTINUED | OUTPATIENT
Start: 2019-04-06 | End: 2019-04-07 | Stop reason: HOSPADM

## 2019-04-06 RX ORDER — DAPSONE 25 MG/1
25 TABLET ORAL EVERY 12 HOURS SCHEDULED
Status: DISCONTINUED | OUTPATIENT
Start: 2019-04-06 | End: 2019-04-07

## 2019-04-06 RX ORDER — POTASSIUM CHLORIDE 750 MG/1
40 CAPSULE, EXTENDED RELEASE ORAL ONCE
Status: COMPLETED | OUTPATIENT
Start: 2019-04-06 | End: 2019-04-06

## 2019-04-06 RX ORDER — SODIUM CHLORIDE 0.9 % (FLUSH) 0.9 %
3 SYRINGE (ML) INJECTION EVERY 12 HOURS SCHEDULED
Status: DISCONTINUED | OUTPATIENT
Start: 2019-04-06 | End: 2019-04-07 | Stop reason: HOSPADM

## 2019-04-06 RX ORDER — POTASSIUM CHLORIDE 7.45 MG/ML
10 INJECTION INTRAVENOUS ONCE
Status: COMPLETED | OUTPATIENT
Start: 2019-04-06 | End: 2019-04-06

## 2019-04-06 RX ORDER — PANTOPRAZOLE SODIUM 40 MG/1
40 TABLET, DELAYED RELEASE ORAL EVERY MORNING
Status: DISCONTINUED | OUTPATIENT
Start: 2019-04-07 | End: 2019-04-07 | Stop reason: HOSPADM

## 2019-04-06 RX ORDER — ACETAMINOPHEN 325 MG/1
650 TABLET ORAL EVERY 4 HOURS PRN
Status: DISCONTINUED | OUTPATIENT
Start: 2019-04-06 | End: 2019-04-07 | Stop reason: HOSPADM

## 2019-04-06 RX ORDER — ASPIRIN 81 MG/1
81 TABLET ORAL DAILY
COMMUNITY

## 2019-04-06 RX ORDER — DOXYCYCLINE HYCLATE 100 MG/1
100 CAPSULE ORAL 2 TIMES DAILY
COMMUNITY

## 2019-04-06 RX ORDER — POTASSIUM CHLORIDE 7.45 MG/ML
10 INJECTION INTRAVENOUS
Status: DISCONTINUED | OUTPATIENT
Start: 2019-04-06 | End: 2019-04-07 | Stop reason: HOSPADM

## 2019-04-06 RX ORDER — DEXTROSE AND SODIUM CHLORIDE 5; .45 G/100ML; G/100ML
75 INJECTION, SOLUTION INTRAVENOUS CONTINUOUS
Status: DISCONTINUED | OUTPATIENT
Start: 2019-04-06 | End: 2019-04-07 | Stop reason: HOSPADM

## 2019-04-06 RX ORDER — DEXTROSE MONOHYDRATE 25 G/50ML
25 INJECTION, SOLUTION INTRAVENOUS
Status: DISCONTINUED | OUTPATIENT
Start: 2019-04-06 | End: 2019-04-07 | Stop reason: HOSPADM

## 2019-04-06 RX ORDER — DAPSONE 25 MG/1
50 TABLET ORAL DAILY
COMMUNITY

## 2019-04-06 RX ORDER — BUMETANIDE 2 MG/1
3 TABLET ORAL 3 TIMES DAILY
COMMUNITY

## 2019-04-06 RX ORDER — ONDANSETRON 4 MG/1
4 TABLET, FILM COATED ORAL EVERY 6 HOURS PRN
Status: DISCONTINUED | OUTPATIENT
Start: 2019-04-06 | End: 2019-04-07 | Stop reason: HOSPADM

## 2019-04-06 RX ORDER — OMEPRAZOLE 20 MG/1
20 CAPSULE, DELAYED RELEASE ORAL DAILY
COMMUNITY

## 2019-04-06 RX ORDER — BORTEZOMIB 3.5 MG/1
1 INJECTION, POWDER, LYOPHILIZED, FOR SOLUTION INTRAVENOUS; SUBCUTANEOUS ONCE
COMMUNITY

## 2019-04-06 RX ORDER — ASPIRIN 81 MG/1
81 TABLET ORAL DAILY
Status: DISCONTINUED | OUTPATIENT
Start: 2019-04-07 | End: 2019-04-07 | Stop reason: HOSPADM

## 2019-04-06 RX ORDER — NITROGLYCERIN 0.4 MG/1
0.4 TABLET SUBLINGUAL
Status: DISCONTINUED | OUTPATIENT
Start: 2019-04-06 | End: 2019-04-07 | Stop reason: HOSPADM

## 2019-04-06 RX ORDER — ATORVASTATIN CALCIUM 20 MG/1
20 TABLET, FILM COATED ORAL DAILY
Status: DISCONTINUED | OUTPATIENT
Start: 2019-04-07 | End: 2019-04-07 | Stop reason: HOSPADM

## 2019-04-06 RX ORDER — DEXTROSE MONOHYDRATE 25 G/50ML
25 INJECTION, SOLUTION INTRAVENOUS ONCE
Status: COMPLETED | OUTPATIENT
Start: 2019-04-06 | End: 2019-04-06

## 2019-04-06 RX ORDER — NICOTINE POLACRILEX 4 MG
15 LOZENGE BUCCAL
Status: DISCONTINUED | OUTPATIENT
Start: 2019-04-06 | End: 2019-04-07 | Stop reason: HOSPADM

## 2019-04-06 RX ORDER — ACYCLOVIR 400 MG/1
400 TABLET ORAL 2 TIMES DAILY
COMMUNITY

## 2019-04-06 RX ORDER — POTASSIUM CHLORIDE 750 MG/1
20 CAPSULE, EXTENDED RELEASE ORAL
Status: DISCONTINUED | OUTPATIENT
Start: 2019-04-07 | End: 2019-04-07 | Stop reason: HOSPADM

## 2019-04-06 RX ORDER — POTASSIUM CHLORIDE 1.5 G/1.77G
40 POWDER, FOR SOLUTION ORAL AS NEEDED
Status: DISCONTINUED | OUTPATIENT
Start: 2019-04-06 | End: 2019-04-07 | Stop reason: HOSPADM

## 2019-04-06 RX ORDER — SODIUM CHLORIDE 0.9 % (FLUSH) 0.9 %
10 SYRINGE (ML) INJECTION AS NEEDED
Status: DISCONTINUED | OUTPATIENT
Start: 2019-04-06 | End: 2019-04-07 | Stop reason: HOSPADM

## 2019-04-06 RX ORDER — POTASSIUM CHLORIDE 20 MEQ/1
60 TABLET, EXTENDED RELEASE ORAL 3 TIMES WEEKLY
COMMUNITY
End: 2019-04-07 | Stop reason: HOSPADM

## 2019-04-06 RX ORDER — METOLAZONE 2.5 MG/1
2.5 TABLET ORAL 3 TIMES WEEKLY
COMMUNITY
End: 2019-04-07 | Stop reason: HOSPADM

## 2019-04-06 RX ORDER — ONDANSETRON 4 MG/1
4 TABLET, ORALLY DISINTEGRATING ORAL EVERY 6 HOURS PRN
Status: DISCONTINUED | OUTPATIENT
Start: 2019-04-06 | End: 2019-04-07 | Stop reason: HOSPADM

## 2019-04-06 RX ORDER — POTASSIUM CHLORIDE 20 MEQ/1
40 TABLET, EXTENDED RELEASE ORAL
COMMUNITY

## 2019-04-06 RX ORDER — SODIUM CHLORIDE 0.9 % (FLUSH) 0.9 %
3-10 SYRINGE (ML) INJECTION AS NEEDED
Status: DISCONTINUED | OUTPATIENT
Start: 2019-04-06 | End: 2019-04-07 | Stop reason: HOSPADM

## 2019-04-06 RX ORDER — ACYCLOVIR 400 MG/1
400 TABLET ORAL 2 TIMES DAILY
Status: DISCONTINUED | OUTPATIENT
Start: 2019-04-06 | End: 2019-04-07 | Stop reason: HOSPADM

## 2019-04-06 RX ORDER — DEXAMETHASONE 4 MG/1
20 TABLET ORAL WEEKLY
COMMUNITY

## 2019-04-06 RX ORDER — CYCLOPHOSPHAMIDE 50 MG/1
400 CAPSULE ORAL WEEKLY
COMMUNITY

## 2019-04-06 RX ADMIN — ENOXAPARIN SODIUM 40 MG: 40 INJECTION SUBCUTANEOUS at 22:51

## 2019-04-06 RX ADMIN — POTASSIUM CHLORIDE 40 MEQ: 750 CAPSULE, EXTENDED RELEASE ORAL at 21:23

## 2019-04-06 RX ADMIN — DEXTROSE MONOHYDRATE 25 G: 25 INJECTION, SOLUTION INTRAVENOUS at 17:05

## 2019-04-06 RX ADMIN — ACYCLOVIR 400 MG: 400 TABLET ORAL at 22:51

## 2019-04-06 RX ADMIN — Medication 3 ML: at 22:56

## 2019-04-06 RX ADMIN — DEXTROSE AND SODIUM CHLORIDE 75 ML/HR: 5; 450 INJECTION, SOLUTION INTRAVENOUS at 17:40

## 2019-04-06 RX ADMIN — POTASSIUM CHLORIDE 10 MEQ: 7.46 INJECTION, SOLUTION INTRAVENOUS at 21:28

## 2019-04-06 RX ADMIN — DOXYCYCLINE 100 MG: 100 CAPSULE ORAL at 22:51

## 2019-04-06 NOTE — ED TRIAGE NOTES
Pt had syncopal episode while cutting grass today. Pt has multiple myeloma and gets weekly chemo.

## 2019-04-06 NOTE — ED NOTES
Pt was at home mowing the grass when he passed out. Pt states he doesn't remember anything and does not think he hit his head. Pt states last syncopal episode was about 4 years ago. Pt does chemo every weds. Pt bit tongue during episode. Pt in NAD.      Nuris Stone, RN  04/06/19 [id number]       Nuris Stone, RN  04/06/19 [id number]

## 2019-04-06 NOTE — ED PROVIDER NOTES
EMERGENCY DEPARTMENT ENCOUNTER    CHIEF COMPLAINT  Chief Complaint: Syncope   History given by: patient   History limited by: n/a   Room Number: S402/1  PMD: Enmanuel Renae MD      HPI:  Pt is a 63 y.o. male who presents for evaluation after a syncopal episode that occurred today while mowing his grass. Pt states that he recalls bringing the mower to his shop, and then recalls waking up on the ground. He denies recalling any events directly preceding the syncope. Pt was given oral glucose by EMS prior to arrival. Blood glucose on arrival to the ED is 64. Pt states that he is being treated for multiple myeloma at [place], last received chemotherapy 3 days ago. Pt also states that he takes steroids every Wednesday, and was recently started on insulin.    Duration:  Prior to arrival  Onset: unknown  Timing: single episode   Location: neuro  Radiation: n/a   Quality: syncope   Intensity/Severity: moderate  Progression: resolved   Associated Symptoms: hypoglycemia   Aggravating Factors: unknown  Alleviating Factors: unknown  Treatment before arrival: oral glucose    PAST MEDICAL HISTORY  Active Ambulatory Problems     Diagnosis Date Noted   • Hyperlipidemia 09/05/2017   • Hypertension 09/05/2017   • Diabetes mellitus with hypoglycemia (CMS/HCC) 09/05/2017   • Colon polyp 09/05/2017   • Vasovagal syncope 12/01/2017   • Chronic diastolic heart failure (CMS/HCC) 09/11/2018   • Pure hypercholesterolemia 10/02/2018   • Essential hypertension 10/02/2018   • LVH (left ventricular hypertrophy) 10/02/2018   • Pulmonary hypertension (CMS/HCC) 10/02/2018   • Acute combined systolic and diastolic congestive heart failure (CMS/HCC) 10/02/2018   • Bence Lema proteinuria 10/24/2018   • Other osteoporosis without current pathological fracture 10/24/2018     Resolved Ambulatory Problems     Diagnosis Date Noted   • CHF (congestive heart failure), NYHA class III, acute on chronic, combined (CMS/HCC) 10/10/2018     Past Medical  History:   Diagnosis Date   • Chronic diastolic heart failure (CMS/HCC) 9/11/2018   • Diabetes mellitus (CMS/[facility])    • Hyperlipidemia    • Hypertension    • Seizure (CMS/[facility])        PAST SURGICAL HISTORY  Past Surgical History:   Procedure Laterality Date   • CARDIAC CATHETERIZATION N/A 10/10/2018    Procedure: Right and Left Heart Cath;  Surgeon: Wm Deleon MD;  Location:  [facility];  Service: Cardiology   • CARDIAC CATHETERIZATION N/A 10/10/2018    Procedure: Coronary angiography;  Surgeon: Wm Deleon MD;  Location:  [facility];  Service: Cardiology   • CARDIAC CATHETERIZATION N/A 10/10/2018    Procedure: Left ventriculography;  Surgeon: Wm Deleon MD;  Location:  [facility];  Service: Cardiology   • CARDIAC CATHETERIZATION N/A 10/15/2018    Procedure: Right Heart Cath, vasodilator study if pressures are elevated;  Surgeon: Maryam Juan MD;  Location:  [facility];  Service: Cardiovascular   • VASECTOMY         FAMILY HISTORY  Family History   Problem Relation Age of Onset   • Heart disease Father    • No Known Problems Mother        SOCIAL HISTORY  Social History     Socioeconomic History   • Marital status:      Spouse name: Karuna   • Number of children: Not on file   • Years of education: Not on file   • Highest education level: Not on file   Occupational History     Employer: [employer]   Tobacco Use   • Smoking status: Never Smoker   • Smokeless tobacco: Never Used   Substance and Sexual Activity   • Alcohol use: No   • Drug use: No   • Sexual activity: Defer       ALLERGIES  Patient has no known allergies.    REVIEW OF SYSTEMS  Review of Systems   Constitutional: Negative for activity change, appetite change and fever.   HENT: Negative for congestion and sore throat.    Eyes: Negative.    Respiratory: Negative for cough and shortness of breath.    Cardiovascular: Negative for chest pain and leg  swelling.   Gastrointestinal: Negative for abdominal pain, diarrhea and vomiting.   Endocrine: Negative.    Genitourinary: Negative for decreased urine volume and dysuria.   Musculoskeletal: Negative for neck pain.   Skin: Negative for rash and wound.   Allergic/Immunologic: Negative.    Neurological: Positive for syncope. Negative for weakness, numbness and headaches.   Hematological: Negative.    Psychiatric/Behavioral: Negative.    All other systems reviewed and are negative.      PHYSICAL EXAM  ED Triage Vitals [04/06/19 1632]   Temp Heart Rate Resp BP SpO2   -- 80 18 104/64 97 %      Temp src Heart Rate Source Patient Position BP Location FiO2 (%)   -- Monitor -- -- --       Physical Exam   Constitutional: He is oriented to person, place, and time. No distress.   HENT:   Head: Normocephalic and atraumatic.   Abrasions to the tongue   Eyes: EOM are normal. Pupils are equal, round, and reactive to light.   Neck: Normal range of motion. Neck supple.   Cardiovascular: Normal rate, regular rhythm and normal heart sounds.   Pulmonary/Chest: Effort normal and breath sounds normal. No respiratory distress.   Abdominal: Soft. There is no tenderness. There is no rebound and no guarding.   Musculoskeletal: Normal range of motion. He exhibits no edema.   Neurological: He is alert and oriented to person, place, and time. He has normal sensation and normal strength.   Skin: Skin is warm and dry.   Psychiatric: Mood and affect normal.   Nursing note and vitals reviewed.      LAB RESULTS  Lab Results (last 24 hours)     Procedure Component Value Units Date/Time    POC Glucose Once [[id number]]  (Abnormal) Collected:  04/06/19 1640    Specimen:  Blood Updated:  04/06/19 1643     Glucose 64 mg/dL     CBC & Differential [[id number]] Collected:  04/06/19 1648    Specimen:  Blood Updated:  04/06/19 1714    Narrative:       The following orders were created for panel order CBC & Differential.  Procedure                                Abnormality         Status                     ---------                               -----------         ------                     CBC Auto Differential[[id number]]        Abnormal            Final result                 Please view results for these tests on the individual orders.    Comprehensive Metabolic Panel [[id number]]  (Abnormal) Collected:  04/06/19 1648    Specimen:  Blood Updated:  04/06/19 1738     Glucose 72 mg/dL      BUN 46 mg/dL      Creatinine 1.53 mg/dL      Sodium 140 mmol/L      Potassium 2.5 mmol/L      Chloride 91 mmol/L      CO2 31.0 mmol/L      Calcium 9.7 mg/dL      Total Protein 6.5 g/dL      Albumin 4.20 g/dL      ALT (SGPT) 34 U/L      AST (SGOT) 30 U/L      Alkaline Phosphatase 77 U/L      Total Bilirubin 1.3 mg/dL      eGFR Non African Amer 46 mL/min/1.73      Globulin 2.3 gm/dL      A/G Ratio 1.8 g/dL      BUN/Creatinine Ratio 30.1     Anion Gap 18.0 mmol/L     Narrative:       GFR Normal >60  Chronic Kidney Disease <60  Kidney Failure <15    Troponin [[id number]]  (Abnormal) Collected:  04/06/19 1648    Specimen:  Blood Updated:  04/06/19 1737     Troponin T 0.172 ng/mL     Narrative:       Troponin T Reference Range:  <= 0.03 ng/mL-   Negative for AMI  >0.03 ng/mL-     Abnormal for myocardial necrosis.  Clinicians would have to utilize clinical acumen, EKG, Troponin and serial changes to determine if it is an Acute Myocardial Infarction or myocardial injury due to an underlying chronic condition.     CBC Auto Differential [[id number]]  (Abnormal) Collected:  04/06/19 1648    Specimen:  Blood Updated:  04/06/19 1714     WBC 8.78 10*3/mm3      RBC 3.56 10*6/mm3      Hemoglobin 11.4 g/dL      Hematocrit 34.0 %      MCV 95.5 fL      MCH 32.0 pg      MCHC 33.5 g/dL      RDW 16.0 %      RDW-SD 57.1 fl      MPV 12.0 fL      Platelets 148 10*3/mm3      Neutrophil % 84.6 %      Lymphocyte % 6.3 %      Monocyte % 7.7 %      Eosinophil % 0.2 %      Basophil % 0.1 %      Immature Grans % 1.1 %       Neutrophils, Absolute 7.42 10*3/mm3      Lymphocytes, Absolute 0.55 10*3/mm3      Monocytes, Absolute 0.68 10*3/mm3      Eosinophils, Absolute 0.02 10*3/mm3      Basophils, Absolute 0.01 10*3/mm3      Immature Grans, Absolute 0.10 10*3/mm3      nRBC 0.0 /100 WBC     Hemoglobin A1c [[id number]]  (Abnormal) Collected:  04/06/19 1648    Specimen:  Blood Updated:  04/06/19 2240     Hemoglobin A1C 8.07 %     Narrative:       Hemoglobin A1C Ranges:    Increased Risk for Diabetes  5.7% to 6.4%  Diabetes                     >= 6.5%  Diabetic Goal                < 7.0%    Magnesium [[id number]]  (Normal) Collected:  04/06/19 1648    Specimen:  Blood Updated:  04/06/19 2035     Magnesium 2.1 mg/dL     POC Glucose Once [[id number]]  (Normal) Collected:  04/06/19 1732    Specimen:  Blood Updated:  04/06/19 1734     Glucose 125 mg/dL     POC Glucose Once [[id number]]  (Abnormal) Collected:  04/06/19 2136    Specimen:  Blood Updated:  04/06/19 2139     Glucose 164 mg/dL           I ordered the above labs and reviewed the results    RADIOLOGY  CT Head Without Contrast   Final Result   No acute process identified.       Radiation dose reduction techniques were utilized, including automated   exposure control and exposure modulation based on body size.       This report was finalized on 4/6/2019 7:31 PM by Dr. Shoaib Arevalo M.D.               I ordered the above noted radiological studies. Interpreted by radiologist.  Reviewed by me in PACS.       PROCEDURES  Procedures    EKG          EKG time: 16:37  Rhythm/Rate: NSR 79  P waves and MD: nml  QRS, axis: widened QRS, RAD, RBBB   ST and T waves: nml   QT interval: nml    Interpreted Contemporaneously by me, independently viewed  Unchanged compared to prior 10/11/18    PROGRESS AND CONSULTS       16:40  CMP, CBC, troponin, and EKG ordered     16:45  D50 ordered.     16:50  BP- 104/64 HR- 80 Temp- 97.8 °F (36.6 °C) O2 sat- 97%  Informed pt of the plan for labs, EKG, and CT head.  Pt understands and agrees with the plan, all questions answered.    17:14  D5 IVF ordered.    19:03  IV and PO potassium ordered to treat hypokalemia.     19:05  BP- 101/60 HR- 77 Temp- 97.8 °F (36.6 °C) O2 sat- 96%  Rechecked the patient who is in NAD and is resting comfortably. Informed pt that the labs show that he is hypokalemia. Troponin is elevated, though much lower than his baseline. Informed pt of the plan for admission for correction of the hypokalemia. Pt understands and agrees with the plan, all questions answered.    19:29  Call placed to [facility] for admission.    20:01  Discussed pt's case with Dr Kumar ([facility]), who agrees to admit the pt.     MEDICAL DECISION MAKING  Results were reviewed/discussed with the patient and they were also made aware of online access. Pt also made aware that some labs, such as cultures, will not be resulted during ER visit and follow up with PMD is necessary.     MDM  Number of Diagnoses or Management Options  Hypoglycemia:   Hypokalemia:   Syncope and collapse:      Amount and/or Complexity of Data Reviewed  Clinical lab tests: ordered and reviewed (Troponin=0.172, K=2.5)  Tests in the radiology section of CPT®: ordered and reviewed (CT head shows NAD)  Tests in the medicine section of CPT®: ordered and reviewed (See EKG procedure note. )  Decide to obtain previous medical records or to obtain history from someone other than the patient: yes  Review and summarize past medical records: yes (Troponin was 0.59 on 3/20/19  K was 2.7 on 4/3/19)  Discuss the patient with other providers: yes (Dr Kumar ([facility]))    Patient Progress  Patient progress: stable         DIAGNOSIS  Final diagnoses:   Syncope and collapse   Hypokalemia   Hypoglycemia       DISPOSITION  ADMISSION    Discussed treatment plan and reason for admission with pt/family and admitting physician.  Pt/family voiced understanding of the plan for admission for further testing/treatment as needed.     Latest  Documented Vital Signs:  As of 10:50 PM  BP- 94/59 HR- 72 Temp- 98.1 °F (36.7 °C) (Oral) O2 sat- 96%    --  Documentation assistance provided by lacey Ambriz for Dr Cain.  Information recorded by the scribe was done at my direction and has been verified and validated by me.       Palma Ambriz  04/06/19 2015       Andrews Cain MD  04/06/19 0331

## 2019-04-07 VITALS
DIASTOLIC BLOOD PRESSURE: 65 MMHG | SYSTOLIC BLOOD PRESSURE: 99 MMHG | TEMPERATURE: 98.1 F | HEIGHT: 71 IN | WEIGHT: 161.7 LBS | BODY MASS INDEX: 22.64 KG/M2 | RESPIRATION RATE: 18 BRPM | HEART RATE: 77 BPM | OXYGEN SATURATION: 95 %

## 2019-04-07 LAB
ANION GAP SERPL CALCULATED.3IONS-SCNC: 14.3 MMOL/L
BUN BLD-MCNC: 39 MG/DL (ref 8–23)
BUN/CREAT SERPL: 30 (ref 7–25)
CALCIUM SPEC-SCNC: 9.2 MG/DL (ref 8.6–10.5)
CHLORIDE SERPL-SCNC: 93 MMOL/L (ref 98–107)
CO2 SERPL-SCNC: 31.7 MMOL/L (ref 22–29)
CREAT BLD-MCNC: 1.3 MG/DL (ref 0.76–1.27)
GFR SERPL CREATININE-BSD FRML MDRD: 56 ML/MIN/1.73
GLUCOSE BLD-MCNC: 158 MG/DL (ref 65–99)
GLUCOSE BLDC GLUCOMTR-MCNC: 122 MG/DL (ref 70–130)
GLUCOSE BLDC GLUCOMTR-MCNC: 162 MG/DL (ref 70–130)
GLUCOSE BLDC GLUCOMTR-MCNC: 191 MG/DL (ref 70–130)
MAGNESIUM SERPL-MCNC: 2.3 MG/DL (ref 1.6–2.4)
POTASSIUM BLD-SCNC: 3.3 MMOL/L (ref 3.5–5.2)
POTASSIUM BLD-SCNC: 3.7 MMOL/L (ref 3.5–5.2)
SODIUM BLD-SCNC: 139 MMOL/L (ref 136–145)
TROPONIN T SERPL-MCNC: 0.26 NG/ML (ref 0–0.03)

## 2019-04-07 PROCEDURE — 82962 GLUCOSE BLOOD TEST: CPT

## 2019-04-07 PROCEDURE — 84484 ASSAY OF TROPONIN QUANT: CPT | Performed by: INTERNAL MEDICINE

## 2019-04-07 PROCEDURE — 96361 HYDRATE IV INFUSION ADD-ON: CPT

## 2019-04-07 PROCEDURE — 84132 ASSAY OF SERUM POTASSIUM: CPT | Performed by: INTERNAL MEDICINE

## 2019-04-07 PROCEDURE — 80048 BASIC METABOLIC PNL TOTAL CA: CPT | Performed by: INTERNAL MEDICINE

## 2019-04-07 PROCEDURE — 99244 OFF/OP CNSLTJ NEW/EST MOD 40: CPT | Performed by: INTERNAL MEDICINE

## 2019-04-07 PROCEDURE — 94799 UNLISTED PULMONARY SVC/PX: CPT

## 2019-04-07 PROCEDURE — 63710000001 INSULIN LISPRO (HUMAN) PER 5 UNITS: Performed by: INTERNAL MEDICINE

## 2019-04-07 PROCEDURE — 36415 COLL VENOUS BLD VENIPUNCTURE: CPT | Performed by: INTERNAL MEDICINE

## 2019-04-07 PROCEDURE — 93005 ELECTROCARDIOGRAM TRACING: CPT | Performed by: INTERNAL MEDICINE

## 2019-04-07 PROCEDURE — 99254 IP/OBS CNSLTJ NEW/EST MOD 60: CPT | Performed by: PSYCHIATRY & NEUROLOGY

## 2019-04-07 PROCEDURE — 93010 ELECTROCARDIOGRAM REPORT: CPT | Performed by: INTERNAL MEDICINE

## 2019-04-07 PROCEDURE — G0378 HOSPITAL OBSERVATION PER HR: HCPCS

## 2019-04-07 PROCEDURE — 83735 ASSAY OF MAGNESIUM: CPT | Performed by: INTERNAL MEDICINE

## 2019-04-07 RX ORDER — DAPSONE 25 MG/1
50 TABLET ORAL
Status: DISCONTINUED | OUTPATIENT
Start: 2019-04-08 | End: 2019-04-07 | Stop reason: HOSPADM

## 2019-04-07 RX ADMIN — DOXYCYCLINE 100 MG: 100 CAPSULE ORAL at 08:21

## 2019-04-07 RX ADMIN — PANTOPRAZOLE SODIUM 40 MG: 40 TABLET, DELAYED RELEASE ORAL at 06:06

## 2019-04-07 RX ADMIN — Medication 3 ML: at 08:00

## 2019-04-07 RX ADMIN — INSULIN LISPRO 2 UNITS: 100 INJECTION, SOLUTION INTRAVENOUS; SUBCUTANEOUS at 08:22

## 2019-04-07 RX ADMIN — POTASSIUM CHLORIDE 40 MEQ: 750 CAPSULE, EXTENDED RELEASE ORAL at 01:20

## 2019-04-07 RX ADMIN — POTASSIUM CHLORIDE 20 MEQ: 750 CAPSULE, EXTENDED RELEASE ORAL at 08:22

## 2019-04-07 RX ADMIN — POTASSIUM CHLORIDE 40 MEQ: 750 CAPSULE, EXTENDED RELEASE ORAL at 06:06

## 2019-04-07 RX ADMIN — ATORVASTATIN CALCIUM 20 MG: 20 TABLET, FILM COATED ORAL at 08:21

## 2019-04-07 RX ADMIN — DEXTROSE AND SODIUM CHLORIDE 75 ML/HR: 5; 450 INJECTION, SOLUTION INTRAVENOUS at 06:38

## 2019-04-07 RX ADMIN — ASPIRIN 81 MG: 81 TABLET, DELAYED RELEASE ORAL at 08:20

## 2019-04-07 RX ADMIN — ACYCLOVIR 400 MG: 400 TABLET ORAL at 08:22

## 2019-04-07 NOTE — PLAN OF CARE
Problem: Patient Care Overview  Goal: Plan of Care Review  Outcome: Ongoing (interventions implemented as appropriate)   04/07/19 0532   Coping/Psychosocial   Plan of Care Reviewed With patient   Plan of Care Review   Progress improving   OTHER   Outcome Summary Pt admitted to [address] [place] [address] from ED. Blood glucose monitored and Potassium protocol initiated. Cardiology and neurology to see in AM. Will coninue to monitor.       Problem: Diabetes, Type 2 (Adult)  Goal: Signs and Symptoms of Listed Potential Problems Will be Absent, Minimized or Managed (Diabetes, Type 2)  Outcome: Ongoing (interventions implemented as appropriate)   04/07/19 0532   Goal/Outcome Evaluation   Problems Assessed (Type 2 Diabetes) all   Problems Present (Type 2 Diabetes) hypoglycemia;situational response       Problem: Fall Risk (Adult)  Goal: Absence of Fall  Outcome: Ongoing (interventions implemented as appropriate)   04/07/19 0532   Fall Risk (Adult)   Absence of Fall making progress toward outcome       Problem: Arrhythmia/Dysrhythmia (Symptomatic) (Adult)  Goal: Signs and Symptoms of Listed Potential Problems Will be Absent, Minimized or Managed (Arrhythmia/Dysrhythmia)  Outcome: Ongoing (interventions implemented as appropriate)   04/07/19 0532   Goal/Outcome Evaluation   Problems Assessed (Arrhythmia/Dysrhythmia) all   Problems Present (Dysrhythmia) hemodynamic instability;situational response;syncope

## 2019-04-07 NOTE — PROGRESS NOTES
Clinical Pharmacy Services: Medication History    Billy Bourne is a 63 y.o. male presenting to [facility] for   Chief Complaint   Patient presents with   • Syncope       He  has a past medical history of Chronic diastolic heart failure (CMS/HCC) (9/11/2018), Diabetes mellitus (CMS/[facility]), Hyperlipidemia, Hypertension, and Seizure (CMS/HCC).    Allergies as of 04/06/2019   • (No Known Allergies)       Medication information was obtained from: Patient and previous notation  Pharmacy and Phone Number: [phone]    Prior to Admission Medications     Prescriptions Last Dose Informant Patient Reported? Taking?    acyclovir (ZOVIRAX) 400 MG tablet  Self Yes Yes    Take 400 mg by mouth 2 (Two) Times a Day. Take no more than 5 doses a day.    aspirin (ASPIR) 81 MG EC tablet  Self Yes Yes    Take 81 mg by mouth Daily.    atorvastatin (LIPITOR) 20 MG tablet  Self Yes Yes    Take 20 mg by mouth Daily.    bortezomib (VELCADE) 3.5 MG chemo syringe  Other Yes Yes    Infuse 1 mg/m2 into a venous catheter 1 (One) Time. Patient gets dose on days 1, 8, 15, and 22 of cycle.    bumetanide (BUMEX) 2 MG tablet  Self Yes Yes    Take 3 mg by mouth 3 (Three) Times a Day.    cyclophosphamide 50 MG capsule  Self Yes Yes    Take 400 mg by mouth 1 (One) Time Per Week. Patient takes on days 1, 8, 15, and 22 of each cycle.    dapsone 25 MG tablet  Self Yes Yes    Take 25 mg by mouth 2 (Two) Times a Day.    dexamethasone (DECADRON) 4 MG tablet  Self Yes Yes    Take 20 mg by mouth 1 (One) Time Per Week. Patient takes on Wednesdays before chemotherapy.    doxycycline (VIBRAMYCIN) 100 MG capsule  Self Yes Yes    Take 100 mg by mouth 2 (Two) Times a Day.    fluticasone (FLONASE) 50 MCG/ACT nasal spray  Self Yes Yes    2 sprays into the nostril(s) as directed by provider Daily.    glimepiride (AMARYL) 2 MG tablet  Self Yes Yes    Take 2 mg by mouth 2 (Two) Times a Day.    loratadine (CLARITIN) 10 MG tablet  Self Yes Yes    Take 10 mg  by mouth Daily.    metOLazone (ZAROXOLYN) 2.5 MG tablet  Self Yes Yes    Take 2.5 mg by mouth 3 (Three) Times a Week. Patient takes on Tuesday, Thursday, and Saturday.    omeprazole (priLOSEC) 20 MG capsule  Self Yes Yes    Take 20 mg by mouth Daily.    ondansetron (ZOFRAN) 4 MG tablet  Self Yes Yes    Take 4 mg by mouth Every 6 (Six) Hours As Needed for Nausea or Vomiting.    potassium chloride (K-DUR,KLOR-CON) 20 MEQ CR tablet  Self Yes Yes    Take 60 mEq by mouth 3 (Three) Times a Week. Patient takes 60 mEq on Tuesday, Thursday, and Saturday (metolozone days).    potassium chloride (K-DUR,KLOR-CON) 20 MEQ CR tablet  Self Yes Yes    Take 40 mEq by mouth 4 (Four) Times a Week. Patient takes on Sunday, Monday, Wednesday, and Friday            Medication notes: Patient states that he was recently initiated on sliding scale insulin. Was not able to inquire regarding insulin type and last doses of other medications. Added metolazone, omeprazole, potassium supplementation, dapsone, aspirin, acyclovir, dexamethasone, cyclophosphamide, doxycycline, ondansetron, and velcade injection per patient and electronic chart profile. Removed carvedilol, entresto, synjardy, and ambien per patient. Per patient, C5D1 was 4/3/19.    This medication list is complete to the best of my knowledge as of 4/6/2019    Please call if questions.    Jose Mujica, pharmacy intern.

## 2019-04-07 NOTE — CONSULTS
"    Patient Name: Billy Bourne  :1955  63 y.o.    Date of Admission: 2019  Date of Consultation:  19  Encounter Provider: Priti Iyer RN  Place of Service: [facility] CARDIOLOGY  Referring Provider: Ben Kumar MD  Patient Care Team:  Enmanuel Renae MD as PCP - General (Internal Medicine)  Katelynn Figueroa MD as Consulting Physician (Endocrinology)  Billy Cortez OD as Consulting Physician (Optometry)  Wm Deleon MD as Referring Physician (Cardiology)  James Canales MD as Consulting Physician (Hematology and Oncology)      Chief complaint: Syncope and collapse    History of Present Illness: Mr. Bourne is a 63 year old pt of Dr. Deleon with a history of cardiac amyoloidosis/ myeloma, currently receiving chemotherapy and managed at [facility], , among other issues. He presents with an episode of syncope which occurred while riding a . There were no prodromal symptoms and he was alone when this occurred. There was some mild confusion afterwards but no clear post-ictal state or loss of continence. He thinks he may have bit his tongue. Unclear whether there were convulsions.     HE was definitively diagnosed with amyloid in the  of . Has been aggressively diuresed in anticipation of repeat RHC this coming Thursday at [facility]- taking Bumex BID, Metolazone thrice weekly and  potassium supplement. He was also recently started on steroids, leading to worsening BGs and new insulin therapy. On Saturday (day of syncope), he gave himself extra post prandial insulin. His under-arm BG monitor showed that his BG was around 60 around the time EMS arrived.   Additionally, it was warm outside, and he had been eating minimally to \"Dry himself out\" for the cath. [place] had also gone [place]. He notes that in the last week he has been dizzy with postural changes.     He denies CP, dyspnea, " palpitations.   FINDINGS:     RIGHT HEART HEMODYNAMICS:   1.  Pulmonary wedge pressure: 38/31, 32  2.  Pulmonary artery pressure: 87/39, 57  3.  Right ventricular pressure: 88/8, 16  4.  Right atrial pressure: 18/17, 15  5.  Pulmonary artery saturation: 66% on room air  6.  Ingrid calculated cardiac output 4.08 L/m, cardiac index 1.97 L/m/m²     ADENOSINE VASODILATOR CHALLENGE:  Adenosine at 50 mcg/kg/m   Pulmonary artery pressure: 90/37, 60  Pulmonary artery saturation: 66% on room air     Adenosine at 100 mcg/kg/m  Pulmonary artery pressure: 98/37, 61  Pulmonary artery saturation: 69% on room air     Adenosine at 150 mcg/kg/m  Pulmonary artery pressure: 91/37, 57  Pulmonary artery saturation: 70% on room air     Adenosine at 200 mcg/kg/m  Pulmonary artery pressure: 90/34, 57  Pulmonary artery saturation: 73% on room air  Cardiac output 7.49 L/m, cardiac index 3.62 L/m/m²        POST-PROCEDURE DIAGNOSIS:   1. Severe pulmonary hypertension  2. Severe biventricular filling pressures      ECHO 9/19/18  · Calculated EF = 42%. Estimated EF was in agreement with the calculated EF. Estimated EF = 42%. Global Longitudinal LV strain = -10.7%. Strain data was reviewed and speckle tracking was considered accurate. The global longitudinal strain is markedly abnormal.  · Normal left ventricular cavity size noted. There is left ventricular global hypokinesis noted. Left ventricular wall thickness is consistent with moderate concentric hypertrophy. The findings are consistent with infiltrative cardiomyopathy. There is an echo bright appearance to the left and right ventricular myocardium suspicious for amyloid heart disease. . Left ventricular diastolic function was indeterminate. Elevated left atrial pressure.  · Normal right ventricular cavity size noted. Right ventricular wall thickness is consistent with moderate hypertrophy. Borderline low right ventricular systolic function noted.  · Left atrial volume is mildly  "increased.  · Right atrial cavity size is mildly dilated  · The valve was poorly visualized but appears trileaflet. The aortic valve is abnormal in structure. There is mild thickening of the aortic valve.  · Mild-to-moderate mitral valve regurgitation is present  · Mild-to-moderate mitral valve regurgitation is present.  · Mild to moderate tricuspid valve regurgitation is present. Estimated right ventricular systolic pressure from tricuspid regurgitation is markedly elevated (>55 mmHg). Calculated right ventricular systolic pressure from tricuspid regurgitation is 67 mmHg.  · There is a trivial pericardial effusion.      Past Medical History:   Diagnosis Date   • Chronic diastolic heart failure (CMS/HCC) 9/11/2018   • Diabetes mellitus (CMS/[facility])    • Hyperlipidemia    • Hypertension    • Seizure (CMS/[facility])     'possible, passed out and not sure why\"       Past Surgical History:   Procedure Laterality Date   • CARDIAC CATHETERIZATION N/A 10/10/2018    Procedure: Right and Left Heart Cath;  Surgeon: Wm Deleon MD;  [facility];  Service: Cardiology   • CARDIAC CATHETERIZATION N/A 10/10/2018    Procedure: Coronary angiography;  Surgeon: Wm Deleon MD;  Location:  [facility] CATH INVASIVE LOCATION;  Service: Cardiology   • CARDIAC CATHETERIZATION N/A 10/10/2018    Procedure: Left ventriculography;  Surgeon: Wm Deleon MD;  Location:  [facility] CATH INVASIVE LOCATION;  Service: Cardiology   • CARDIAC CATHETERIZATION N/A 10/15/2018    Procedure: Right Heart Cath, vasodilator study if pressures are elevated;  Surgeon: Maryam Juan MD;  Location:  [facility] CATH INVASIVE LOCATION;  Service: Cardiovascular   • VASECTOMY           Prior to Admission medications    Medication Sig Start Date End Date Taking? Authorizing Provider   acyclovir (ZOVIRAX) 400 MG tablet Take 400 mg by mouth 2 (Two) Times a Day. Take no more than 5 doses a day.   Yes Provider, MD Jose "   aspirin (ASPIR) 81 MG EC tablet Take 81 mg by mouth Daily.   Yes Jose Roldan MD   atorvastatin (LIPITOR) 20 MG tablet Take 20 mg by mouth Daily. 8/4/17  Yes Provider, Historical, MD   bortezomib (VELCADE) 3.5 MG chemo syringe Infuse 1 mg/m2 into a venous catheter 1 (One) Time. Patient gets dose on days 1, 8, 15, and 22 of cycle.   Yes Jose Roldan MD   bumetanide (BUMEX) 2 MG tablet Take 3 mg by mouth 3 (Three) Times a Day.   Yes Jose Roldan MD   cyclophosphamide 50 MG capsule Take 400 mg by mouth 1 (One) Time Per Week. Patient takes on days 1, 8, 15, and 22 of each cycle.   Yes Jose Roldan MD   dapsone 25 MG tablet Take 50 mg by mouth Daily.   Yes Jose Roldan MD   dexamethasone (DECADRON) 4 MG tablet Take 20 mg by mouth 1 (One) Time Per Week. Patient takes on Wednesdays before chemotherapy.   Yes Jose Roldan MD   doxycycline (VIBRAMYCIN) 100 MG capsule Take 100 mg by mouth 2 (Two) Times a Day.   Yes Jose Roldan MD   fluticasone (FLONASE) 50 MCG/ACT nasal spray 2 sprays into the nostril(s) as directed by provider Daily.   Yes Jose Roldan MD   glimepiride (AMARYL) 2 MG tablet Take 2 mg by mouth 2 (Two) Times a Day. 8/4/17  Yes Jose Roldan MD   loratadine (CLARITIN) 10 MG tablet Take 10 mg by mouth Daily.   Yes Jose Roldan MD   metOLazone (ZAROXOLYN) 2.5 MG tablet Take 2.5 mg by mouth 3 (Three) Times a Week. Patient takes on Tuesday, Thursday, and Saturday.   Yes Jose Roldan MD   omeprazole (priLOSEC) 20 MG capsule Take 20 mg by mouth Daily.   Yes Jose Roldan MD   ondansetron (ZOFRAN) 4 MG tablet Take 4 mg by mouth Every 6 (Six) Hours As Needed for Nausea or Vomiting.   Yes Jose Roldan MD   potassium chloride (K-DUR,KLOR-CON) 20 MEQ CR tablet Take 60 mEq by mouth 3 (Three) Times a Week. Patient takes 60 mEq on Tuesday, Thursday, and Saturday (metolozone days).   Yes Provider, Historical,  MD   potassium chloride (K-DUR,KLOR-CON) 20 MEQ CR tablet Take 40 mEq by mouth 4 (Four) Times a Week. Patient takes on Sunday, Monday, Wednesday, and Friday   Yes Provider, MD Jose       No Known Allergies    Social History     Socioeconomic History   • Marital status:      Spouse name: Karuna   • Number of children: Not on file   • Years of education: Not on file   • Highest education level: Not on file   Occupational History     Employer: [employer]   Tobacco Use   • Smoking status: Never Smoker   • Smokeless tobacco: Never Used   Substance and Sexual Activity   • Alcohol use: No   • Drug use: No   • Sexual activity: Defer       Family History   Problem Relation Age of Onset   • Heart disease Father    • No Known Problems Mother        REVIEW OF SYSTEMS:   All systems reviewed.  Pertinent positives identified in HPI.  All other systems are negative.      Objective:     Vitals:    04/06/19 2212 04/07/19 0024 04/07/19 0239 04/07/19 0751   BP: 94/59 90/64  90/63   BP Location: Right arm Right arm  Left arm   Patient Position: Lying Lying  Lying   Pulse: 72 75 77 76   Resp: 16 16  18   Temp: 98.1 °F (36.7 °C) 98.4 °F (36.9 °C)  98.5 °F (36.9 °C)   TempSrc: Oral Oral  Oral   SpO2: 96% 95% 94% 95%   Weight:       Height:         Body mass index is 22.55 kg/m².    General Appearance:    Alert, cooperative, in no acute distress   Head:    Normocephalic, without obvious abnormality, atraumatic   Eyes:            Lids and lashes normal, conjunctivae and sclerae normal, no   icterus, no pallor, corneas clear, PERRLA   Ears:    Ears appear intact with no abnormalities noted   Throat:   Dry mucus membranes   Neck:  flat neck veins, no carotid bruits   Back:     No kyphosis present, no scoliosis present, no skin lesions, erythema or scars, no tenderness to percussion or palpation, range of motion normal   Lungs:     Clear to auscultation,respirations regular, even and unlabored    Heart:    RRR no murmur   Chest Wall:     No abnormalities observed   Abdomen:     Normal bowel sounds, no masses, no organomegaly, soft        non-tender, non-distended, no guarding, no rebound  tenderness   Extremities:   Trace edema   Pulses:   Pulses palpable and equal bilaterally. Normal radial, carotid, femoral, dorsalis pedis and posterior tibial pulses bilaterally. Normal abdominal aorta   Skin:  Psychiatric:   No bleeding, bruising or rash    Alert and oriented x 3, normal mood and affect   Lab Review:     Results from last 7 days   Lab Units 04/07/19  0431 04/06/19  1648   SODIUM mmol/L 139 140   POTASSIUM mmol/L 3.3* 2.5*   CHLORIDE mmol/L 93* 91*   CO2 mmol/L 31.7* 31.0*   BUN mg/dL 39* 46*   CREATININE mg/dL 1.30* 1.53*   CALCIUM mg/dL 9.2 9.7   BILIRUBIN mg/dL  --  1.3*   ALK PHOS U/L  --  77   ALT (SGPT) U/L  --  34   AST (SGOT) U/L  --  30   GLUCOSE mg/dL 158* 72     Results from last 7 days   Lab Units 04/07/19  0431 04/06/19  1648   TROPONIN T ng/mL 0.262* 0.172*     Results from last 7 days   Lab Units 04/06/19  1648   WBC 10*3/mm3 8.78   HEMOGLOBIN g/dL 11.4*   HEMATOCRIT % 34.0*   PLATELETS 10*3/mm3 148         Results from last 7 days   Lab Units 04/07/19  0431   MAGNESIUM mg/dL 2.3                                   I personally viewed and interpreted the patient's EKG/Telemetry data.        Assessment and Plan:       This is a 63 year old man with cardiac amyloidosis / myeloma, severe PH, who presents with syncope.     He has a number of potential causes of syncope, which I think was multifactorial -- volume depletion due to purposeful decreased PO intake and aggressive diuresis; hypoglycemia 2/2 insulin usage. Amyloid causes extremely brittle BP which makes hypotension common particularly with diuresis.    Additionally, his K was 2.5. In the setting of amyloid I think it is definitely possible that he may have had a tachyarrhythmia causing syncope as well.    The family is adamant that they would prefer to be worked up at  [facility]. I think they are most comfortable there because the entire team can confer there as he is getting worked up for cardiac transplant and I totally understand that.      His K is now repleted and BG has improved. Hes had no arrhythmia on telemetry other than an occasional PVC. I am ok with him going home and then [facility].   - hold metolazone  - liberalize diet  - continue regular Bumex dosage.   - follow up with Dr. Deleon as previously planned.     Thank you for including me in the care of this patient.     Sneha Lazo MD  [facility]  04/07/19

## 2019-04-07 NOTE — H&P
Patient Name:  Billy Bourne  YOB: 1955  MRN:  [id number]  Admit Date:  4/6/2019  Patient Care Team:  Enmanuel Renae MD as PCP - General (Internal Medicine)  Katelynn Figueroa MD as Consulting Physician (Endocrinology)  Billy Cortez OD as Consulting Physician (Optometry)  Wm Deleon MD as Referring Physician (Cardiology)  James Canales MD as Consulting Physician (Hematology and Oncology)      Subjective   History Present Illness     Chief Complaint   Patient presents with   • Syncope       Mr. Bourne is a 63 y.o. non-smoker with a history of cardiac amyloidosis, multiple myeloma not in remission currently getting weekly chemotherapy and [place], chronic kidney disease stage III, type 2 diabetes that presents to [facility] complaining of passing out.  He was in his usual state of health.  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  He felt completely normal but then he found himself waking up on the ground.  He was not sure what happened.  He was able to get up, restart the lawnmower and his friend called EMS.  The episode was not witnessed and he is not sure how long he was down.  Per report, when EMS arrived his glucose was 40.  Couple weeks ago the patient's Lantus was increased from 7 units to 14 units and he took 8 units of Humalog this morning instead of 7.  His glucose this morning was 300 and has been high lately due to his steroid use when he gets chemotherapy.  He has passed out previously related to vasovagal syncope but he had a prodrome with that episode.  There was absolutely no prodrome today.  He did bite his tongue but denies any urinary incontinence.  No history of seizures either.  His EKG did not show ACS but his troponin was elevated at 0.172.  He denies any chest pain or shortness of breath and does report his troponin is usually quite high.  His potassium was low at 2.5 but his magnesium is normal.  He does take Bumex and  "metolazone for diuretics.  He was started on replacement in the emergency room.  His last echocardiogram was in September 2018 showing EF of 42%, evidence of infiltrative cardiomyopathy, pulmonary hypertension and mild to moderate valvular disease.  He was admitted to our service for further care.    Of note, his glucose was in the 60s when he arrived.  He was placed on D5 half-normal saline in the emergency room and his glucose has not dropped since.  History of Present Illness  Review of Systems   Constitutional: Negative for activity change, appetite change, fatigue, fever and unexpected weight change.   HENT: Negative for nosebleeds, sore throat and trouble swallowing.    Eyes: Negative for pain and visual disturbance.   Respiratory: Positive for shortness of breath (Intermittently). Negative for cough and chest tightness.    Cardiovascular: Negative for chest pain, palpitations and leg swelling.   Gastrointestinal: Negative for abdominal pain, constipation, diarrhea, nausea and vomiting.   Endocrine:        Positive for diabetes but negative for thyroid disease   Genitourinary: Negative for hematuria.   Musculoskeletal: Negative.  Negative for arthralgias, back pain, gait problem, neck pain and neck stiffness.   Skin: Negative for rash and wound.   Neurological: Positive for syncope. Negative for dizziness, weakness, light-headedness and headaches.   Hematological: Negative for adenopathy. Does not bruise/bleed easily.   Psychiatric/Behavioral: Negative for agitation, behavioral problems and confusion.        Personal History     Past Medical History:   Diagnosis Date   • Chronic diastolic heart failure (CMS/HCC) 9/11/2018   • Diabetes mellitus (CMS/HCC)    • Hyperlipidemia    • Hypertension    • Seizure (CMS/HCC)     'possible, passed out and not sure why\"     Past Surgical History:   Procedure Laterality Date   • CARDIAC CATHETERIZATION N/A 10/10/2018    Procedure: Right and Left Heart Cath;  Surgeon: Pancho " Wm ZENDEJAS MD;  Location: [facility] CATH INVASIVE LOCATION;  Service: Cardiology   • CARDIAC CATHETERIZATION N/A 10/10/2018    Procedure: Coronary angiography;  Surgeon: Wm Deleon MD;  [facility] C[facility] INVASIVE LOC[facility];  Service: Cardiology   • CARDIAC CATHETERIZATION N/A 10/10/2018    Procedure: Left ventriculography;  Surgeon: Wm Deleon MD;  [facility];  Service: Cardiology   • CARDIAC CATHETERIZATION N/A 10/15/2018    Procedure: Right Heart Cath, vasodilator study if pressures are elevated;  Surgeon: Maryam Juan MD;  Location: [facility] CATH INVASIVE LOCATION;  Service: Cardiovascular   • VASECTOMY       Family History   Problem Relation Age of Onset   • Heart disease Father    • No Known Problems Mother      Social History     Tobacco Use   • Smoking status: Never Smoker   • Smokeless tobacco: Never Used   Substance Use Topics   • Alcohol use: No   • Drug use: No       (Not in a hospital admission)  Allergies:  No Known Allergies    Objective    Objective     Vital Signs  Temp:  [97.8 °F (36.6 °C)] 97.8 °F (36.6 °C)  Heart Rate:  [75-80] 75  Resp:  [18] 18  BP: ()/(60-65) 98/65  SpO2:  [96 %-97 %] 96 %  on   ;   Device (Oxygen Therapy): room air  Body mass index is 22.55 kg/m².    Physical Exam   Constitutional: He is oriented to person, place, and time. He appears well-developed and well-nourished.   HENT:   Head: Normocephalic and atraumatic.   Mouth/Throat: Oropharynx is clear and moist. No oropharyngeal exudate.   Eyes: Conjunctivae and EOM are normal. Pupils are equal, round, and reactive to light. No scleral icterus.   Neck: Normal range of motion. Neck supple. No JVD present. No thyromegaly present.   Cardiovascular: Normal rate and regular rhythm.   Murmur heard.  Pulmonary/Chest: Effort normal and breath sounds normal. No respiratory distress. He has no wheezes. He has no rales.   Abdominal: Soft. Bowel sounds are normal. He exhibits  no distension. There is no tenderness. There is no rebound and no guarding.   Musculoskeletal: He exhibits no edema or tenderness.   Lymphadenopathy:     He has no cervical adenopathy.   Neurological: He is alert and oriented to person, place, and time. No cranial nerve deficit.   Skin: Skin is warm and dry.   Psychiatric: He has a normal mood and affect. His behavior is normal.       Results Review:  I reviewed the patient's new clinical results.  I reviewed the patient's new imaging results and agree with the interpretation.  I reviewed the patient's other test results and agree with the interpretation  I personally viewed and interpreted the patient's EKG/Telemetry data  Discussed with ED provider.    Lab Results (last 24 hours)     Procedure Component Value Units Date/Time    POC Glucose Once [[id number]]  (Abnormal) Collected:  04/06/19 1640    Specimen:  Blood Updated:  04/06/19 1643     Glucose 64 mg/dL     CBC & Differential [[id number]] Collected:  04/06/19 1648    Specimen:  Blood Updated:  04/06/19 1714    Narrative:       The following orders were created for panel order CBC & Differential.  Procedure                               Abnormality         Status                     ---------                               -----------         ------                     CBC Auto Differential[[id number]]        Abnormal            Final result                 Please view results for these tests on the individual orders.    Comprehensive Metabolic Panel [[id number]]  (Abnormal) Collected:  04/06/19 1648    Specimen:  Blood Updated:  04/06/19 1738     Glucose 72 mg/dL      BUN 46 mg/dL      Creatinine 1.53 mg/dL      Sodium 140 mmol/L      Potassium 2.5 mmol/L      Chloride 91 mmol/L      CO2 31.0 mmol/L      Calcium 9.7 mg/dL      Total Protein 6.5 g/dL      Albumin 4.20 g/dL      ALT (SGPT) 34 U/L      AST (SGOT) 30 U/L      Alkaline Phosphatase 77 U/L      Total Bilirubin 1.3 mg/dL      eGFR Non  Amer 46  mL/min/1.73      Globulin 2.3 gm/dL      A/G Ratio 1.8 g/dL      BUN/Creatinine Ratio 30.1     Anion Gap 18.0 mmol/L     Narrative:       GFR Normal >60  Chronic Kidney Disease <60  Kidney Failure <15    Troponin [[id number]]  (Abnormal) Collected:  04/06/19 1648    Specimen:  Blood Updated:  04/06/19 1737     Troponin T 0.172 ng/mL     Narrative:       Troponin T Reference Range:  <= 0.03 ng/mL-   Negative for AMI  >0.03 ng/mL-     Abnormal for myocardial necrosis.  Clinicians would have to utilize clinical acumen, EKG, Troponin and serial changes to determine if it is an Acute Myocardial Infarction or myocardial injury due to an underlying chronic condition.     CBC Auto Differential [[id number]]  (Abnormal) Collected:  04/06/19 1648    Specimen:  Blood Updated:  04/06/19 1714     WBC 8.78 10*3/mm3      RBC 3.56 10*6/mm3      Hemoglobin 11.4 g/dL      Hematocrit 34.0 %      MCV 95.5 fL      MCH 32.0 pg      MCHC 33.5 g/dL      RDW 16.0 %      RDW-SD 57.1 fl      MPV 12.0 fL      Platelets 148 10*3/mm3      Neutrophil % 84.6 %      Lymphocyte % 6.3 %      Monocyte % 7.7 %      Eosinophil % 0.2 %      Basophil % 0.1 %      Immature Grans % 1.1 %      Neutrophils, Absolute 7.42 10*3/mm3      Lymphocytes, Absolute 0.55 10*3/mm3      Monocytes, Absolute 0.68 10*3/mm3      Eosinophils, Absolute 0.02 10*3/mm3      Basophils, Absolute 0.01 10*3/mm3      Immature Grans, Absolute 0.10 10*3/mm3      nRBC 0.0 /100 WBC     Magnesium [[id number]]  (Normal) Collected:  04/06/19 1648    Specimen:  Blood Updated:  04/06/19 2035     Magnesium 2.1 mg/dL     POC Glucose Once [[id number]]  (Normal) Collected:  04/06/19 1732    Specimen:  Blood Updated:  04/06/19 1734     Glucose 125 mg/dL           Imaging Results (last 24 hours)     Procedure Component Value Units Date/Time    CT Head Without Contrast [[id number]] Collected:  04/06/19 1854     Updated:  04/06/19 1935    Narrative:       CT OF THE BRAIN WITHOUT CONTRAST 04/06/2019       HISTORY: Syncope. Fainting.     TECHNIQUE: Axial images were obtained through the brain without  intravenous contrast.     FINDINGS: The brain parenchyma and ventricular system appear within  normal limits. Tiny calcification is seen in the right frontal white  matter of doubtful significance. There is no evidence of acute  infarction, hemorrhage, midline shift or mass effect.     No bony abnormalities are seen.       Impression:       No acute process identified.     Radiation dose reduction techniques were utilized, including automated  exposure control and exposure modulation based on body size.     This report was finalized on 4/6/2019 7:31 PM by Dr. Shoaib Arevalo M.D.             Results for orders placed in visit on 09/11/18   Adult Transthoracic Echo Complete W/ Cont if Necessary Per Protocol    Narrative · Calculated EF = 42%. Estimated EF was in agreement with the calculated   EF. Estimated EF = 42%. Global Longitudinal LV strain = -10.7%. Strain   data was reviewed and speckle tracking was considered accurate. The global   longitudinal strain is markedly abnormal.  · Normal left ventricular cavity size noted. There is left ventricular   global hypokinesis noted. Left ventricular wall thickness is consistent   with moderate concentric hypertrophy. The findings are consistent with   infiltrative cardiomyopathy. There is an echo bright appearance to the   left and right ventricular myocardium suspicious for amyloid heart   disease. . Left ventricular diastolic function was indeterminate. Elevated   left atrial pressure.  · Normal right ventricular cavity size noted. Right ventricular wall   thickness is consistent with moderate hypertrophy. Borderline low right   ventricular systolic function noted.  · Left atrial volume is mildly increased.  · Right atrial cavity size is mildly dilated  · The valve was poorly visualized but appears trileaflet. The aortic valve   is abnormal in structure. There is mild  thickening of the aortic valve.  · Mild-to-moderate mitral valve regurgitation is present  · Mild-to-moderate mitral valve regurgitation is present.  · Mild to moderate tricuspid valve regurgitation is present. Estimated   right ventricular systolic pressure from tricuspid regurgitation is   markedly elevated (>55 mmHg). Calculated right ventricular systolic   pressure from tricuspid regurgitation is 67 mmHg.  · There is a trivial pericardial effusion.          ECG 12 Lead   Preliminary Result   HEART RATE= 79  bpm   RR Interval= 760  ms   ND Interval= 204  ms   P Horizontal Axis= -62  deg   P Front Axis= 83  deg   QRSD Interval= 130  ms   QT Interval= 469  ms   QRS Axis= 159  deg   T Wave Axis= 73  deg   - ABNORMAL ECG -   Sinus rhythm   Probable left atrial enlargement   RBBB and LPFB   Probable anteroseptal infarct, old   Nonspecific T abnormalities, lateral leads   Borderline ST elevation, lateral leads   Electronically Signed By:    Date and Time of Study: 2019-04-06 16:37:35           Assessment/Plan     Active Hospital Problems    Diagnosis POA   • **Syncope and collapse [R55] Yes   • Multiple myeloma without remission (CMS/HCC) [C90.00] Yes   • Stage 3 chronic kidney disease (CMS/HCC) [N18.3] Yes   • Hypokalemia [E87.6] Yes   • Cardiac amyloidosis (CMS/HCC) [E85.4, I43] Yes   • Essential hypertension [I10] Yes     Added automatically from request for surgery [id number]     • Pulmonary hypertension (CMS/HCC) [I27.20] Yes     Added automatically from request for surgery [id number]     • Chronic diastolic heart failure (CMS/HCC) [I50.32] Yes   • Hypertension [I10] Yes   • Diabetes mellitus with hypoglycemia (CMS/HCC) [E11.649] Yes       Mr. Bourne is a 63 y.o. non-smoker with a history of cardiac amyloidosis, multiple myeloma not in remission currently getting weekly chemotherapy and [place], chronic kidney disease stage III, type 2 diabetes that presents to [facility] complaining of passing out.      · My biggest concern is that he had a cardiac arrhythmia or other cardiac cause for him to pass out in the setting of cardiac amyloidosis.  There was no prodrome.  He has a history of vasovagal syncope but has had prodrome with this.  His potassium was quite low at 2.5 which may have triggered an arrhythmia.  Magnesium is normal.  Replace and follow his electrolytes very closely.  I think his hypoglycemia is somewhat of a red herring.  He also bit his tongue which brings up the possibility of him having a seizure.  I will consult both his cardiologist and neurology for evaluation.  Admit him to telemetry.  Recheck EKG and troponin in the morning.  Even though troponin is elevated at 0.17 he has no chest pain or other symptoms.  We will continue D5 half-normal saline for his hypoglycemia but if his glucose starts to increase we can discontinue the fluid.  Place him on a sliding scale insulin.  Hold his Lantus for now.  His chronic kidney disease stage III is stable.  Follow closely as well.  He has multiple myeloma without remission who is currently getting chemotherapy every Wednesday at [facility].  ·   · I discussed the patients findings and my recommendations with patient and nursing staff.    VTE Prophylaxis - Lovenox 40 mg SC daily.  Code Status - Full code.       Ben Kumar MD  [facility]  04/06/19  9:21 PM

## 2019-04-07 NOTE — DISCHARGE SUMMARY
"       Name: Billy Bourne  Age: 63 y.o.  Sex: male  :  1955  MRN: [id number]         Primary Care Physician: Enmanuel Renae MD      Date of Admission:  2019  Date of Discharge:  2019      CHIEF COMPLAINT     Syncope      DISCHARGE DIAGNOSIS  Active Hospital Problems    Diagnosis  POA   • **Syncope and collapse [R55]  Yes   • Multiple myeloma without remission (CMS/HCC) [C90.00]  Yes   • Stage 3 chronic kidney disease (CMS/HCC) [N18.3]  Yes   • Hypokalemia [E87.6]  Yes   • Cardiac amyloidosis (CMS/HCC) [E85.4, I43]  Yes   • Essential hypertension [I10]  Yes   • Pulmonary hypertension (CMS/HCC) [I27.20]  Yes   • Chronic diastolic heart failure (CMS/HCC) [I50.32]  Yes   • Hypertension [I10]  Yes   • Diabetes mellitus with hypoglycemia (CMS/HCC) [E11.649]  Yes      Resolved Hospital Problems   No resolved problems to display.       SECONDARY DIAGNOSES  Past Medical History:   Diagnosis Date   • Chronic diastolic heart failure (CMS/HCC) 2018   • Diabetes mellitus (CMS/HCC)    • Hyperlipidemia    • Hypertension    • Seizure (CMS/HCC)     'possible, passed out and not sure why\"       CONSULTS   Dr. DENIS Lazo MD. Cardiology  Dr. Cee neurology      HOSPITAL COURSE  Mr. Bourne is a 63 y.o. non-smoker with a history of cardiac amyloidosis, multiple myeloma not in remission currently getting weekly chemotherapy and [place], chronic kidney disease stage III, type 2 diabetes that presents to [facility] complaining of passing out.  He was in his usual state of health.  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  He felt completely normal but then he found himself waking up on the ground.  He was not sure what happened.  He was able to get up, restart the lawnmower and his friend called EMS.  The episode was not witnessed and he is not sure how long he was down.  Per report, when EMS arrived his glucose was 40.  Couple weeks ago the patient's Lantus was increased from 7 units to 14 " units and he took 8 units of Humalog this morning instead of 7.  His glucose this morning was 300 and has been high lately due to his steroid use when he gets chemotherapy.  He has passed out previously related to vasovagal syncope but he had a prodrome with that episode.  There was absolutely no prodrome today.  He did bite his tongue but denies any urinary incontinence.  No history of seizures either.  His EKG did not show ACS but his troponin was elevated at 0.172.  He denies any chest pain or shortness of breath and does report his troponin is usually quite high.  His potassium was low at 2.5 but his magnesium is normal.  He does take Bumex and metolazone for diuretics.  His hypoglycemia and hyperkalemia was treated with glucose and potassium supplements.  Because of syncope he was also seen by stroke team.  He was also seen by cardiology.  Patient normally follows at [facility] and is has a scheduled appointment for Thursday for a heart catheterization.  Family and the patient was very insistent that they wanted to go back to [facility] for further care.  Dr. Lazo from cardiology talked to the patient and family and she agrees with the discharge and the patient with all his previous medications except for metolazone.  Family will make an appointment at [facility] and go to the clinic for follow-up.            PHYSICAL EXAM  Temp:  [97.8 °F (36.6 °C)-98.5 °F (36.9 °C)] 98.1 °F (36.7 °C)  Heart Rate:  [70-80] 77  Resp:  [16-18] 18  BP: ()/(56-65) 99/65  Body mass index is 22.55 kg/m².  Physical Exam  HEENT: Unremarkable, pupils are round equal and reacting to light   NECK: No lymphadenopathy, throat is clear,   RESPRATORY SYSTEM: Breath sounds are equal on both sides and are normal, no wheezes or crackles  CARDIOVASULAR SYSTEM: Heart rate is regular without murmur  ABDOMEN: Soft, no ascites, no hepatosplenomegaly.  EXTREMITIES: No cyanosis, clubbing or edema    CONDITION ON  DISCHARGE  Stable.      DISCHARGE DISPOSITION   Home      ALLERGIES  No Known Allergies    RECENT LABS  Results from last 7 days   Lab Units 04/06/19  1648   WBC 10*3/mm3 8.78   HEMOGLOBIN g/dL 11.4*   HEMATOCRIT % 34.0*   PLATELETS 10*3/mm3 148     Results from last 7 days   Lab Units 04/07/19  0944 04/07/19  0431 04/06/19  1648   SODIUM mmol/L  --  139 140   POTASSIUM mmol/L 3.7 3.3* 2.5*   CHLORIDE mmol/L  --  93* 91*   CO2 mmol/L  --  31.7* 31.0*   BUN mg/dL  --  39* 46*   CREATININE mg/dL  --  1.30* 1.53*   GLUCOSE mg/dL  --  158* 72   CALCIUM mg/dL  --  9.2 9.7           DIET;  Diet Order   Procedures   • Diet Regular; Cardiac       DISCHARGE MEDICATIONS     Your medication list      CHANGE how you take these medications      Instructions Last Dose Given Next Dose Due   potassium chloride 20 MEQ CR tablet  Commonly known as:  K-DUR,KLOR-CON  What changed:  Another medication with the same name was removed. Continue taking this medication, and follow the directions you see here.      Take 40 mEq by mouth 4 (Four) Times a Week. Patient takes on Sunday, Monday, Wednesday, and Friday          CONTINUE taking these medications      Instructions Last Dose Given Next Dose Due   acyclovir 400 MG tablet  Commonly known as:  ZOVIRAX      Take 400 mg by mouth 2 (Two) Times a Day. Take no more than 5 doses a day.       ASPIR 81 MG EC tablet  Generic drug:  aspirin      Take 81 mg by mouth Daily.       atorvastatin 20 MG tablet  Commonly known as:  LIPITOR      Take 20 mg by mouth Daily.       bortezomib 3.5 MG chemo syringe  Commonly known as:  VELCADE      Infuse 1 mg/m2 into a venous catheter 1 (One) Time. Patient gets dose on days 1, 8, 15, and 22 of cycle.       bumetanide 2 MG tablet  Commonly known as:  BUMEX      Take 3 mg by mouth 3 (Three) Times a Day.       cyclophosphamide 50 MG capsule      Take 400 mg by mouth 1 (One) Time Per Week. Patient takes on days 1, 8, 15, and 22 of each cycle.       dapsone 25 MG  tablet      Take 50 mg by mouth Daily.       dexamethasone 4 MG tablet  Commonly known as:  DECADRON      Take 20 mg by mouth 1 (One) Time Per Week. Patient takes on Wednesdays before chemotherapy.       doxycycline 100 MG capsule  Commonly known as:  VIBRAMYCIN      Take 100 mg by mouth 2 (Two) Times a Day.       fluticasone 50 MCG/ACT nasal spray  Commonly known as:  FLONASE      2 sprays into the nostril(s) as directed by provider Daily.       glimepiride 2 MG tablet  Commonly known as:  AMARYL      Take 2 mg by mouth 2 (Two) Times a Day.       omeprazole 20 MG capsule  Commonly known as:  priLOSEC      Take 20 mg by mouth Daily.       ondansetron 4 MG tablet  Commonly known as:  ZOFRAN      Take 4 mg by mouth Every 6 (Six) Hours As Needed for Nausea or Vomiting.          STOP taking these medications    CLARITIN 10 MG tablet  Generic drug:  loratadine        metOLazone 2.5 MG tablet  Commonly known as:  ZAROXOLYN              No future appointments.  Follow-up Information     [facility] Follow up.           Enmanuel Renae MD .    Specialty:  Internal Medicine  Contact information:  [address] [place] [address]  [phone]                   CODE STATUS  Code Status and Medical Interventions:   Ordered at: 04/06/19 2120     Code Status:    CPR     Medical Interventions (Level of Support Prior to Arrest):    Full           Madan Smyth MD  [facility]  04/07/19      Time: greater than 35 minutes.
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
## 2019-04-07 NOTE — NURSING NOTE
Dr. Johan Cee notified via telephone call that patient and wife declines further neurologic workup at this time and requested to be discharged at this time.

## 2019-04-08 ENCOUNTER — TELEPHONE (OUTPATIENT)
Dept: NEUROLOGY | Facility: CLINIC | Age: 64
End: 2019-04-08

## 2019-04-08 ENCOUNTER — READMISSION MANAGEMENT (OUTPATIENT)
Dept: CALL CENTER | Facility: HOSPITAL | Age: 64
End: 2019-04-08

## 2019-04-08 NOTE — PROGRESS NOTES
Case Management Discharge Note    Final Note: home    Destination      No service has been selected for the patient.      Durable Medical Equipment      No service has been selected for the patient.      Dialysis/Infusion      No service has been selected for the patient.      Home Medical Care      No service has been selected for the patient.      Therapy      No service has been selected for the patient.      Community Resources      No service has been selected for the patient.        Transportation Services  Private: Car    Final Discharge Disposition Code: 01 - home or self-care

## 2019-04-08 NOTE — OUTREACH NOTE
Prep Survey      Responses   Facility patient discharged from?  [facility]   Is patient eligible?  Yes   Discharge diagnosis  Syncope and collapse    Does the patient have one of the following disease processes/diagnoses(primary or secondary)?  Other   Does the patient have Home health ordered?  No   Is there a DME ordered?  No   Prep survey completed?  Yes          Miranda Caldera RN
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
## 2019-04-09 ENCOUNTER — READMISSION MANAGEMENT (OUTPATIENT)
Dept: CALL CENTER | Facility: HOSPITAL | Age: 64
End: 2019-04-09

## 2019-04-09 NOTE — OUTREACH NOTE
Medical Week 1 Survey      Responses   Facility patient discharged from?  [facility]   Does the patient have one of the following disease processes/diagnoses(primary or secondary)?  Other   Is there a successful TCM telephone encounter documented?  No   Week 1 attempt successful?  Yes   Call start time  1709   Call end time  1716   Discharge diagnosis  Syncope and collapse    Meds reviewed with patient/caregiver?  Yes   Is the patient having any side effects they believe may be caused by any medication additions or changes?  No   Does the patient have all medications ordered at discharge?  Yes   Is the patient taking all medications as directed (includes completed medication regime)?  Yes   Does the patient have a primary care provider?   Yes   Does the patient have an appointment with their PCP within 7 days of discharge?  Yes   Has the patient kept scheduled appointments due by today?  N/A   Has home health visited the patient within 72 hours of discharge?  N/A   Psychosocial issues?  No   Did the patient receive a copy of their discharge instructions?  Yes   Nursing interventions  Reviewed instructions with patient   What is the patient's perception of their health status since discharge?  Improving   Is the patient/caregiver able to teach back signs and symptoms related to disease process for when to call PCP?  Yes   Is the patient/caregiver able to teach back signs and symptoms related to disease process for when to call 911?  Yes   Is the patient/caregiver able to teach back the hierarchy of who to call/visit for symptoms/problems? PCP, Specialist, Home health nurse, Urgent Care, ED, 911  Yes   Additional teach back comments  He says he has had no syncope or dizziness since going home.   Week 1 call completed?  Yes          Julissa Hollins RN

## 2019-04-12 ENCOUNTER — TRANSCRIBE ORDERS (OUTPATIENT)
Dept: ADMINISTRATIVE | Facility: HOSPITAL | Age: 64
End: 2019-04-12

## 2019-04-12 ENCOUNTER — TELEPHONE (OUTPATIENT)
Dept: NEUROLOGY | Facility: CLINIC | Age: 64
End: 2019-04-12

## 2019-04-12 ENCOUNTER — LAB (OUTPATIENT)
Dept: LAB | Facility: HOSPITAL | Age: 64
End: 2019-04-12

## 2019-04-12 DIAGNOSIS — I50.9 CHRONIC HEART FAILURE, UNSPECIFIED HEART FAILURE TYPE (HCC): Primary | ICD-10-CM

## 2019-04-12 DIAGNOSIS — I50.9 CHRONIC HEART FAILURE, UNSPECIFIED HEART FAILURE TYPE (HCC): ICD-10-CM

## 2019-04-12 LAB
ANION GAP SERPL CALCULATED.3IONS-SCNC: 17.9 MMOL/L
BUN BLD-MCNC: 46 MG/DL (ref 8–23)
BUN/CREAT SERPL: 23.6 (ref 7–25)
CALCIUM SPEC-SCNC: 9.6 MG/DL (ref 8.6–10.5)
CHLORIDE SERPL-SCNC: 95 MMOL/L (ref 98–107)
CO2 SERPL-SCNC: 27.1 MMOL/L (ref 22–29)
CREAT BLD-MCNC: 1.95 MG/DL (ref 0.76–1.27)
GFR SERPL CREATININE-BSD FRML MDRD: 35 ML/MIN/1.73
GLUCOSE BLD-MCNC: 178 MG/DL (ref 65–99)
POTASSIUM BLD-SCNC: 3.6 MMOL/L (ref 3.5–5.2)
SODIUM BLD-SCNC: 140 MMOL/L (ref 136–145)

## 2019-04-12 PROCEDURE — 36415 COLL VENOUS BLD VENIPUNCTURE: CPT

## 2019-04-12 PROCEDURE — 80048 BASIC METABOLIC PNL TOTAL CA: CPT

## 2019-04-12 NOTE — TELEPHONE ENCOUNTER
----- Message from Saloni Espinosa sent at 4/12/2019  9:49 AM EDT -----  Contact: [phone]  Patients wife called wanting to let  know that patient has already been seen at [facility]. They are wondering what is the next step with patient being able to start driving again. She also wants to make  aware that it could've been patients potassium and low blood sugar that made patients symptoms occur.

## 2019-04-14 NOTE — TELEPHONE ENCOUNTER
Bladimir,  I recommended he have an MRI and EEG. He had a seizure. It MAY have been convulsive syncope or related to low blood sugar but not proven. He needs to actually be properly evaluated first. The did not want to stay 1 extra day to get these done as he was to see [facility] for transplant evaluation.     So,  he cannot drive for 90 days as per [place] law as he had enough evidence (tongue biting) associated with his loss of consciousness spell. I cannot even OK his driving then without a workup.     He needs to agree to MRI and EEG and a F/U with NP in office to straighten this out.    gns

## 2019-04-15 ENCOUNTER — LAB (OUTPATIENT)
Dept: LAB | Facility: HOSPITAL | Age: 64
End: 2019-04-15

## 2019-04-15 DIAGNOSIS — R56.9 GENERALIZED CONVULSIVE SEIZURES (HCC): Primary | ICD-10-CM

## 2019-04-15 DIAGNOSIS — I50.9 CHRONIC HEART FAILURE, UNSPECIFIED HEART FAILURE TYPE (HCC): ICD-10-CM

## 2019-04-15 LAB
ANION GAP SERPL CALCULATED.3IONS-SCNC: 14.9 MMOL/L
BUN BLD-MCNC: 44 MG/DL (ref 8–23)
BUN/CREAT SERPL: 29.9 (ref 7–25)
CALCIUM SPEC-SCNC: 9.8 MG/DL (ref 8.6–10.5)
CHLORIDE SERPL-SCNC: 94 MMOL/L (ref 98–107)
CO2 SERPL-SCNC: 33.1 MMOL/L (ref 22–29)
CREAT BLD-MCNC: 1.47 MG/DL (ref 0.76–1.27)
GFR SERPL CREATININE-BSD FRML MDRD: 48 ML/MIN/1.73
GLUCOSE BLD-MCNC: 116 MG/DL (ref 65–99)
POTASSIUM BLD-SCNC: 3.7 MMOL/L (ref 3.5–5.2)
SODIUM BLD-SCNC: 142 MMOL/L (ref 136–145)

## 2019-04-15 PROCEDURE — 36415 COLL VENOUS BLD VENIPUNCTURE: CPT

## 2019-04-15 PROCEDURE — 80048 BASIC METABOLIC PNL TOTAL CA: CPT

## 2019-04-15 NOTE — TELEPHONE ENCOUNTER
S/w pt he said he'll do the MRI and EEG need orders placed. He said he'll callback to schedule a f/u appt for the office.

## 2019-04-18 ENCOUNTER — READMISSION MANAGEMENT (OUTPATIENT)
Dept: CALL CENTER | Facility: HOSPITAL | Age: 64
End: 2019-04-18

## 2019-04-18 NOTE — OUTREACH NOTE
Medical Week 2 Survey      Responses   Facility patient discharged from?  [facility]   Does the patient have one of the following disease processes/diagnoses(primary or secondary)?  Other   Week 2 attempt successful?  Yes   Call start time  1655   Discharge diagnosis  Syncope and collapse    Call end time  1659   Is patient permission given to speak with other caregiver?  Yes   Person spoke with today (if not patient) and relationship  Spouse   Meds reviewed with patient/caregiver?  Yes   Is the patient having any side effects they believe may be caused by any medication additions or changes?  No   Does the patient have all medications ordered at discharge?  Yes   Is the patient taking all medications as directed (includes completed medication regime)?  Yes   Does the patient have a primary care provider?   Yes   Does the patient have an appointment with their PCP within 7 days of discharge?  Yes   Has the patient kept scheduled appointments due by today?  Yes   Comments  Keeping up with all appts.   Has home health visited the patient within 72 hours of discharge?  N/A   Psychosocial issues?  No   Comments  Spouse reports being upset r/t her not knowing about pts seizure driving precautions at time of d/c. No other medical complaints.   Did the patient receive a copy of their discharge instructions?  Yes   Nursing interventions  Reviewed instructions with patient   What is the patient's perception of their health status since discharge?  Improving   Is the patient/caregiver able to teach back signs and symptoms related to disease process for when to call PCP?  Yes   Is the patient/caregiver able to teach back signs and symptoms related to disease process for when to call 911?  Yes   Is the patient/caregiver able to teach back the hierarchy of who to call/visit for symptoms/problems? PCP, Specialist, Home health nurse, Urgent Care, ED, 911  Yes   Week 2 Call Completed?  Yes          Ben Wen RN

## 2019-04-22 ENCOUNTER — LAB (OUTPATIENT)
Dept: LAB | Facility: HOSPITAL | Age: 64
End: 2019-04-22

## 2019-04-22 DIAGNOSIS — I50.9 CHRONIC HEART FAILURE, UNSPECIFIED HEART FAILURE TYPE (HCC): ICD-10-CM

## 2019-04-22 LAB
ANION GAP SERPL CALCULATED.3IONS-SCNC: 14 MMOL/L
BUN BLD-MCNC: 36 MG/DL (ref 8–23)
BUN/CREAT SERPL: 24.3 (ref 7–25)
CALCIUM SPEC-SCNC: 9.9 MG/DL (ref 8.6–10.5)
CHLORIDE SERPL-SCNC: 96 MMOL/L (ref 98–107)
CO2 SERPL-SCNC: 31 MMOL/L (ref 22–29)
CREAT BLD-MCNC: 1.48 MG/DL (ref 0.76–1.27)
GFR SERPL CREATININE-BSD FRML MDRD: 48 ML/MIN/1.73
GLUCOSE BLD-MCNC: 103 MG/DL (ref 65–99)
POTASSIUM BLD-SCNC: 3.6 MMOL/L (ref 3.5–5.2)
SODIUM BLD-SCNC: 141 MMOL/L (ref 136–145)

## 2019-04-22 PROCEDURE — 80048 BASIC METABOLIC PNL TOTAL CA: CPT

## 2019-04-22 PROCEDURE — 36415 COLL VENOUS BLD VENIPUNCTURE: CPT

## 2019-04-30 ENCOUNTER — HOSPITAL ENCOUNTER (OUTPATIENT)
Dept: NEUROLOGY | Facility: HOSPITAL | Age: 64
Discharge: HOME | End: 2019-04-30
Admitting: PSYCHIATRY & NEUROLOGY

## 2019-04-30 ENCOUNTER — HOSPITAL ENCOUNTER (OUTPATIENT)
Dept: MRI IMAGING | Facility: HOSPITAL | Age: 64
Discharge: HOME | End: 2019-04-30

## 2019-04-30 ENCOUNTER — TELEPHONE (OUTPATIENT)
Dept: CARDIAC REHAB | Facility: HOSPITAL | Age: 64
End: 2019-04-30

## 2019-04-30 DIAGNOSIS — R56.9 GENERALIZED CONVULSIVE SEIZURES (HCC): ICD-10-CM

## 2019-04-30 DIAGNOSIS — I50.22 CHRONIC SYSTOLIC CONGESTIVE HEART FAILURE (HCC): Primary | ICD-10-CM

## 2019-04-30 PROCEDURE — 82565 ASSAY OF CREATININE: CPT

## 2019-04-30 PROCEDURE — 0 GADOBENATE DIMEGLUMINE 529 MG/ML SOLUTION: Performed by: PSYCHIATRY & NEUROLOGY

## 2019-04-30 PROCEDURE — 70553 MRI BRAIN STEM W/O & W/DYE: CPT

## 2019-04-30 PROCEDURE — 95816 EEG AWAKE AND DROWSY: CPT

## 2019-04-30 PROCEDURE — A9577 INJ MULTIHANCE: HCPCS | Performed by: PSYCHIATRY & NEUROLOGY

## 2019-04-30 PROCEDURE — 95819 EEG AWAKE AND ASLEEP: CPT | Performed by: PSYCHIATRY & NEUROLOGY

## 2019-04-30 RX ADMIN — GADOBENATE DIMEGLUMINE 15 ML: 529 INJECTION, SOLUTION INTRAVENOUS at 15:27

## 2019-04-30 NOTE — TELEPHONE ENCOUNTER
Called pt secondary to receiving referral for outpatient cardiac rehab from a fax from [facility].  Pt says he is being seen every Wednesday at [place] [facility] [facility] for treatment for his myeloma.  He also has an upcoming evaluation for potential heart transplant scheduled May 20-23.  He has another stress test also scheduled at [place] May 15.  Pt will be at [facility] tomorrow and will try to get an idea of what his schedule will be like at [place] [facility] [place] in the upcoming weeks.  Pt will call us back to schedule outpatient cardiac rehab once he knows his schedule but very likely will be able to do at least every Monday and every Friday cardiac rehab.

## 2019-05-01 LAB — CREAT BLDA-MCNC: 1.6 MG/DL (ref 0.6–1.3)

## 2019-07-08 ENCOUNTER — TELEPHONE (OUTPATIENT)
Dept: CARDIAC REHAB | Facility: HOSPITAL | Age: 64
End: 2019-07-08

## 2019-07-08 DIAGNOSIS — Z94.1 HEART TRANSPLANT RECIPIENT (HCC): Primary | ICD-10-CM

## 2019-07-12 ENCOUNTER — TREATMENT (OUTPATIENT)
Dept: CARDIAC REHAB | Facility: HOSPITAL | Age: 64
End: 2019-07-12

## 2019-07-12 DIAGNOSIS — Z94.1 HEART TRANSPLANT RECIPIENT (HCC): Primary | ICD-10-CM

## 2019-07-12 PROCEDURE — 93798 PHYS/QHP OP CAR RHAB W/ECG: CPT

## 2019-07-17 ENCOUNTER — TREATMENT (OUTPATIENT)
Dept: CARDIAC REHAB | Facility: HOSPITAL | Age: 64
End: 2019-07-17

## 2019-07-17 DIAGNOSIS — Z94.1 HEART TRANSPLANT RECIPIENT (HCC): Primary | ICD-10-CM

## 2019-07-17 PROCEDURE — 93798 PHYS/QHP OP CAR RHAB W/ECG: CPT

## 2019-07-19 ENCOUNTER — TREATMENT (OUTPATIENT)
Dept: CARDIAC REHAB | Facility: HOSPITAL | Age: 64
End: 2019-07-19

## 2019-07-19 DIAGNOSIS — Z94.1 HEART TRANSPLANT RECIPIENT (HCC): Primary | ICD-10-CM

## 2019-07-19 PROCEDURE — 93798 PHYS/QHP OP CAR RHAB W/ECG: CPT

## 2019-07-22 ENCOUNTER — TREATMENT (OUTPATIENT)
Dept: CARDIAC REHAB | Facility: HOSPITAL | Age: 64
End: 2019-07-22

## 2019-07-22 DIAGNOSIS — Z94.1 HEART TRANSPLANT RECIPIENT (HCC): Primary | ICD-10-CM

## 2019-07-22 PROCEDURE — 93798 PHYS/QHP OP CAR RHAB W/ECG: CPT

## 2019-07-26 ENCOUNTER — TREATMENT (OUTPATIENT)
Dept: CARDIAC REHAB | Facility: HOSPITAL | Age: 64
End: 2019-07-26

## 2019-07-26 DIAGNOSIS — Z94.1 HEART TRANSPLANT RECIPIENT (HCC): Primary | ICD-10-CM

## 2019-07-26 PROCEDURE — 93798 PHYS/QHP OP CAR RHAB W/ECG: CPT

## 2019-07-29 ENCOUNTER — TRANSCRIBE ORDERS (OUTPATIENT)
Dept: LAB | Facility: HOSPITAL | Age: 64
End: 2019-07-29

## 2019-07-29 ENCOUNTER — LAB (OUTPATIENT)
Dept: LAB | Facility: HOSPITAL | Age: 64
End: 2019-07-29

## 2019-07-29 ENCOUNTER — TREATMENT (OUTPATIENT)
Dept: CARDIAC REHAB | Facility: HOSPITAL | Age: 64
End: 2019-07-29

## 2019-07-29 DIAGNOSIS — Z94.1 HEART REPLACED BY TRANSPLANT (HCC): ICD-10-CM

## 2019-07-29 DIAGNOSIS — Z94.1 HEART REPLACED BY TRANSPLANT (HCC): Primary | ICD-10-CM

## 2019-07-29 DIAGNOSIS — Z94.1 HEART TRANSPLANT RECIPIENT (HCC): Primary | ICD-10-CM

## 2019-07-29 LAB
ANION GAP SERPL CALCULATED.3IONS-SCNC: 13 MMOL/L (ref 5–15)
BUN BLD-MCNC: 41 MG/DL (ref 8–23)
BUN/CREAT SERPL: 18.7 (ref 7–25)
CALCIUM SPEC-SCNC: 9.3 MG/DL (ref 8.6–10.5)
CHLORIDE SERPL-SCNC: 101 MMOL/L (ref 98–107)
CO2 SERPL-SCNC: 28 MMOL/L (ref 22–29)
CREAT BLD-MCNC: 2.19 MG/DL (ref 0.76–1.27)
GFR SERPL CREATININE-BSD FRML MDRD: 31 ML/MIN/1.73
GLUCOSE BLD-MCNC: 144 MG/DL (ref 65–99)
POTASSIUM BLD-SCNC: 4.1 MMOL/L (ref 3.5–5.2)
SODIUM BLD-SCNC: 142 MMOL/L (ref 136–145)

## 2019-07-29 PROCEDURE — 80048 BASIC METABOLIC PNL TOTAL CA: CPT

## 2019-07-29 PROCEDURE — 36415 COLL VENOUS BLD VENIPUNCTURE: CPT

## 2019-07-29 PROCEDURE — 93798 PHYS/QHP OP CAR RHAB W/ECG: CPT

## 2019-07-31 ENCOUNTER — APPOINTMENT (OUTPATIENT)
Dept: CARDIAC REHAB | Facility: HOSPITAL | Age: 64
End: 2019-07-31

## 2019-07-31 ENCOUNTER — TREATMENT (OUTPATIENT)
Dept: CARDIAC REHAB | Facility: HOSPITAL | Age: 64
End: 2019-07-31

## 2019-07-31 DIAGNOSIS — Z94.1 HEART TRANSPLANT RECIPIENT (HCC): ICD-10-CM

## 2019-07-31 PROCEDURE — 93798 PHYS/QHP OP CAR RHAB W/ECG: CPT

## 2019-08-09 ENCOUNTER — TREATMENT (OUTPATIENT)
Dept: CARDIAC REHAB | Facility: HOSPITAL | Age: 64
End: 2019-08-09

## 2019-08-09 DIAGNOSIS — Z94.1 HEART TRANSPLANT RECIPIENT (HCC): Primary | ICD-10-CM

## 2019-08-09 PROCEDURE — 93798 PHYS/QHP OP CAR RHAB W/ECG: CPT

## 2019-08-12 ENCOUNTER — TREATMENT (OUTPATIENT)
Dept: CARDIAC REHAB | Facility: HOSPITAL | Age: 64
End: 2019-08-12

## 2019-08-12 DIAGNOSIS — Z94.1 HEART TRANSPLANT RECIPIENT (HCC): Primary | ICD-10-CM

## 2019-08-12 PROCEDURE — 93798 PHYS/QHP OP CAR RHAB W/ECG: CPT

## 2019-08-14 ENCOUNTER — TREATMENT (OUTPATIENT)
Dept: CARDIAC REHAB | Facility: HOSPITAL | Age: 64
End: 2019-08-14

## 2019-08-14 DIAGNOSIS — Z94.1 HEART TRANSPLANT RECIPIENT (HCC): Primary | ICD-10-CM

## 2019-08-14 PROCEDURE — 93798 PHYS/QHP OP CAR RHAB W/ECG: CPT

## 2019-08-16 ENCOUNTER — TREATMENT (OUTPATIENT)
Dept: CARDIAC REHAB | Facility: HOSPITAL | Age: 64
End: 2019-08-16

## 2019-08-16 DIAGNOSIS — Z94.1 HEART TRANSPLANT RECIPIENT (HCC): Primary | ICD-10-CM

## 2019-08-16 PROCEDURE — 93798 PHYS/QHP OP CAR RHAB W/ECG: CPT

## 2019-08-19 ENCOUNTER — TREATMENT (OUTPATIENT)
Dept: CARDIAC REHAB | Facility: HOSPITAL | Age: 64
End: 2019-08-19

## 2019-08-19 DIAGNOSIS — Z94.1 HEART TRANSPLANT RECIPIENT (HCC): Primary | ICD-10-CM

## 2019-08-19 PROCEDURE — 93798 PHYS/QHP OP CAR RHAB W/ECG: CPT

## 2019-08-23 ENCOUNTER — TREATMENT (OUTPATIENT)
Dept: CARDIAC REHAB | Facility: HOSPITAL | Age: 64
End: 2019-08-23

## 2019-08-23 DIAGNOSIS — Z94.1 HEART TRANSPLANT RECIPIENT (HCC): Primary | ICD-10-CM

## 2019-08-23 PROCEDURE — 93798 PHYS/QHP OP CAR RHAB W/ECG: CPT

## 2019-08-26 ENCOUNTER — TREATMENT (OUTPATIENT)
Dept: CARDIAC REHAB | Facility: HOSPITAL | Age: 64
End: 2019-08-26

## 2019-08-26 DIAGNOSIS — Z94.1 HEART TRANSPLANT RECIPIENT (HCC): Primary | ICD-10-CM

## 2019-08-26 PROCEDURE — 93798 PHYS/QHP OP CAR RHAB W/ECG: CPT

## 2019-08-28 ENCOUNTER — APPOINTMENT (OUTPATIENT)
Dept: CARDIAC REHAB | Facility: HOSPITAL | Age: 64
End: 2019-08-28

## 2019-08-29 ENCOUNTER — TELEPHONE (OUTPATIENT)
Dept: CARDIOLOGY | Facility: CLINIC | Age: 64
End: 2019-08-29

## 2019-08-30 ENCOUNTER — APPOINTMENT (OUTPATIENT)
Dept: CARDIAC REHAB | Facility: HOSPITAL | Age: 64
End: 2019-08-30

## 2019-09-04 ENCOUNTER — APPOINTMENT (OUTPATIENT)
Dept: CARDIAC REHAB | Facility: HOSPITAL | Age: 64
End: 2019-09-04

## 2020-04-08 ENCOUNTER — TRANSCRIBE ORDERS (OUTPATIENT)
Dept: ADMINISTRATIVE | Facility: HOSPITAL | Age: 65
End: 2020-04-08

## 2020-04-08 ENCOUNTER — LAB (OUTPATIENT)
Dept: LAB | Facility: HOSPITAL | Age: 65
End: 2020-04-08

## 2020-04-08 DIAGNOSIS — I43 NUTRITIONAL AND METABOLIC CARDIOMYOPATHY (HCC): ICD-10-CM

## 2020-04-08 DIAGNOSIS — E85.4 RESTRICTIVE CARDIOMYOPATHY SECONDARY TO AMYLOIDOSIS (HCC): Primary | ICD-10-CM

## 2020-04-08 DIAGNOSIS — I43 RESTRICTIVE CARDIOMYOPATHY SECONDARY TO AMYLOIDOSIS (HCC): Primary | ICD-10-CM

## 2020-04-08 DIAGNOSIS — I43 RESTRICTIVE CARDIOMYOPATHY SECONDARY TO AMYLOIDOSIS (HCC): ICD-10-CM

## 2020-04-08 DIAGNOSIS — E88.9 NUTRITIONAL AND METABOLIC CARDIOMYOPATHY (HCC): ICD-10-CM

## 2020-04-08 DIAGNOSIS — E63.9 NUTRITIONAL AND METABOLIC CARDIOMYOPATHY (HCC): ICD-10-CM

## 2020-04-08 DIAGNOSIS — E85.4 RESTRICTIVE CARDIOMYOPATHY SECONDARY TO AMYLOIDOSIS (HCC): ICD-10-CM

## 2020-04-08 LAB
ALBUMIN SERPL-MCNC: 4.5 G/DL (ref 3.5–5.2)
ALBUMIN/GLOB SERPL: 2.3 G/DL
ALP SERPL-CCNC: 78 U/L (ref 39–117)
ALT SERPL W P-5'-P-CCNC: 8 U/L (ref 1–41)
ANION GAP SERPL CALCULATED.3IONS-SCNC: 11 MMOL/L (ref 5–15)
AST SERPL-CCNC: 16 U/L (ref 1–40)
BASOPHILS # BLD AUTO: 0.02 10*3/MM3 (ref 0–0.2)
BASOPHILS NFR BLD AUTO: 0.7 % (ref 0–1.5)
BILIRUB SERPL-MCNC: 0.6 MG/DL (ref 0.2–1.2)
BUN BLD-MCNC: 25 MG/DL (ref 8–23)
BUN/CREAT SERPL: 12.8 (ref 7–25)
CALCIUM SPEC-SCNC: 9 MG/DL (ref 8.6–10.5)
CHLORIDE SERPL-SCNC: 104 MMOL/L (ref 98–107)
CO2 SERPL-SCNC: 21 MMOL/L (ref 22–29)
CREAT BLD-MCNC: 1.96 MG/DL (ref 0.76–1.27)
DEPRECATED RDW RBC AUTO: 43.8 FL (ref 37–54)
EOSINOPHIL # BLD AUTO: 0.05 10*3/MM3 (ref 0–0.4)
EOSINOPHIL NFR BLD AUTO: 1.8 % (ref 0.3–6.2)
ERYTHROCYTE [DISTWIDTH] IN BLOOD BY AUTOMATED COUNT: 12.8 % (ref 12.3–15.4)
GFR SERPL CREATININE-BSD FRML MDRD: 35 ML/MIN/1.73
GLOBULIN UR ELPH-MCNC: 2 GM/DL
GLUCOSE BLD-MCNC: 258 MG/DL (ref 65–99)
HCT VFR BLD AUTO: 29.9 % (ref 37.5–51)
HGB BLD-MCNC: 9.6 G/DL (ref 13–17.7)
IMM GRANULOCYTES # BLD AUTO: 0.05 10*3/MM3 (ref 0–0.05)
IMM GRANULOCYTES NFR BLD AUTO: 1.8 % (ref 0–0.5)
LYMPHOCYTES # BLD AUTO: 0.69 10*3/MM3 (ref 0.7–3.1)
LYMPHOCYTES NFR BLD AUTO: 24.7 % (ref 19.6–45.3)
MAGNESIUM SERPL-MCNC: 1.7 MG/DL (ref 1.6–2.4)
MCH RBC QN AUTO: 29.9 PG (ref 26.6–33)
MCHC RBC AUTO-ENTMCNC: 32.1 G/DL (ref 31.5–35.7)
MCV RBC AUTO: 93.1 FL (ref 79–97)
MONOCYTES # BLD AUTO: 0.26 10*3/MM3 (ref 0.1–0.9)
MONOCYTES NFR BLD AUTO: 9.3 % (ref 5–12)
NEUTROPHILS # BLD AUTO: 1.72 10*3/MM3 (ref 1.7–7)
NEUTROPHILS NFR BLD AUTO: 61.7 % (ref 42.7–76)
NRBC BLD AUTO-RTO: 0 /100 WBC (ref 0–0.2)
PLATELET # BLD AUTO: 97 10*3/MM3 (ref 140–450)
PMV BLD AUTO: 10.7 FL (ref 6–12)
POTASSIUM BLD-SCNC: 4.8 MMOL/L (ref 3.5–5.2)
PROT SERPL-MCNC: 6.5 G/DL (ref 6–8.5)
RBC # BLD AUTO: 3.21 10*6/MM3 (ref 4.14–5.8)
SODIUM BLD-SCNC: 136 MMOL/L (ref 136–145)
WBC NRBC COR # BLD: 2.79 10*3/MM3 (ref 3.4–10.8)

## 2020-04-08 PROCEDURE — 85025 COMPLETE CBC W/AUTO DIFF WBC: CPT

## 2020-04-08 PROCEDURE — 83735 ASSAY OF MAGNESIUM: CPT

## 2020-04-08 PROCEDURE — 80053 COMPREHEN METABOLIC PANEL: CPT

## 2020-04-08 PROCEDURE — 36415 COLL VENOUS BLD VENIPUNCTURE: CPT

## 2020-05-28 ENCOUNTER — LAB (OUTPATIENT)
Dept: LAB | Facility: HOSPITAL | Age: 65
End: 2020-05-28

## 2020-05-28 ENCOUNTER — TRANSCRIBE ORDERS (OUTPATIENT)
Dept: ADMINISTRATIVE | Facility: HOSPITAL | Age: 65
End: 2020-05-28

## 2020-05-28 DIAGNOSIS — Z94.1 HEART REPLACED BY TRANSPLANT (HCC): ICD-10-CM

## 2020-05-28 DIAGNOSIS — Z94.1 HEART REPLACED BY TRANSPLANT (HCC): Primary | ICD-10-CM

## 2020-05-28 LAB
25(OH)D3 SERPL-MCNC: 27.2 NG/ML (ref 30–100)
NT-PROBNP SERPL-MCNC: 447 PG/ML (ref 5–900)

## 2020-05-28 PROCEDURE — 80197 ASSAY OF TACROLIMUS: CPT

## 2020-05-28 PROCEDURE — 36415 COLL VENOUS BLD VENIPUNCTURE: CPT

## 2020-05-28 PROCEDURE — 83880 ASSAY OF NATRIURETIC PEPTIDE: CPT

## 2020-05-28 PROCEDURE — 82306 VITAMIN D 25 HYDROXY: CPT

## 2020-06-01 LAB — TACROLIMUS BLD-MCNC: NORMAL NG/ML

## 2020-06-11 ENCOUNTER — TRANSCRIBE ORDERS (OUTPATIENT)
Dept: ADMINISTRATIVE | Facility: HOSPITAL | Age: 65
End: 2020-06-11

## 2020-06-11 ENCOUNTER — LAB (OUTPATIENT)
Dept: LAB | Facility: HOSPITAL | Age: 65
End: 2020-06-11

## 2020-06-11 DIAGNOSIS — C90.00 MYELOMA ASSOCIATED AMYLOIDOSIS (HCC): Primary | ICD-10-CM

## 2020-06-11 DIAGNOSIS — E85.9 MYELOMA ASSOCIATED AMYLOIDOSIS (HCC): ICD-10-CM

## 2020-06-11 DIAGNOSIS — E85.9 MYELOMA ASSOCIATED AMYLOIDOSIS (HCC): Primary | ICD-10-CM

## 2020-06-11 DIAGNOSIS — C90.00 MYELOMA ASSOCIATED AMYLOIDOSIS (HCC): ICD-10-CM

## 2020-06-11 LAB
25(OH)D3 SERPL-MCNC: 33.6 NG/ML (ref 30–100)
ALBUMIN SERPL-MCNC: 4.3 G/DL (ref 3.5–5.2)
ALBUMIN/GLOB SERPL: 2 G/DL
ALP SERPL-CCNC: 68 U/L (ref 39–117)
ALT SERPL W P-5'-P-CCNC: 15 U/L (ref 1–41)
ANION GAP SERPL CALCULATED.3IONS-SCNC: 9.1 MMOL/L (ref 5–15)
AST SERPL-CCNC: 22 U/L (ref 1–40)
BASOPHILS # BLD AUTO: 0.01 10*3/MM3 (ref 0–0.2)
BASOPHILS NFR BLD AUTO: 0.3 % (ref 0–1.5)
BILIRUB SERPL-MCNC: 0.4 MG/DL (ref 0.2–1.2)
BUN BLD-MCNC: 22 MG/DL (ref 8–23)
BUN/CREAT SERPL: 14.1 (ref 7–25)
CALCIUM SPEC-SCNC: 9 MG/DL (ref 8.6–10.5)
CHLORIDE SERPL-SCNC: 107 MMOL/L (ref 98–107)
CO2 SERPL-SCNC: 23.9 MMOL/L (ref 22–29)
CREAT BLD-MCNC: 1.56 MG/DL (ref 0.76–1.27)
DEPRECATED RDW RBC AUTO: 46.7 FL (ref 37–54)
EOSINOPHIL # BLD AUTO: 0.04 10*3/MM3 (ref 0–0.4)
EOSINOPHIL NFR BLD AUTO: 1.3 % (ref 0.3–6.2)
ERYTHROCYTE [DISTWIDTH] IN BLOOD BY AUTOMATED COUNT: 13 % (ref 12.3–15.4)
GFR SERPL CREATININE-BSD FRML MDRD: 45 ML/MIN/1.73
GLOBULIN UR ELPH-MCNC: 2.1 GM/DL
GLUCOSE BLD-MCNC: 153 MG/DL (ref 65–99)
HCT VFR BLD AUTO: 31.5 % (ref 37.5–51)
HGB BLD-MCNC: 10 G/DL (ref 13–17.7)
IGA1 MFR SER: 19 MG/DL (ref 70–400)
IGG1 SER-MCNC: 445 MG/DL (ref 700–1600)
IGM SERPL-MCNC: 24 MG/DL (ref 40–230)
IMM GRANULOCYTES # BLD AUTO: 0.07 10*3/MM3 (ref 0–0.05)
IMM GRANULOCYTES NFR BLD AUTO: 2.3 % (ref 0–0.5)
LYMPHOCYTES # BLD AUTO: 0.52 10*3/MM3 (ref 0.7–3.1)
LYMPHOCYTES NFR BLD AUTO: 17.4 % (ref 19.6–45.3)
MCH RBC QN AUTO: 30.6 PG (ref 26.6–33)
MCHC RBC AUTO-ENTMCNC: 31.7 G/DL (ref 31.5–35.7)
MCV RBC AUTO: 96.3 FL (ref 79–97)
MONOCYTES # BLD AUTO: 0.27 10*3/MM3 (ref 0.1–0.9)
MONOCYTES NFR BLD AUTO: 9 % (ref 5–12)
NEUTROPHILS # BLD AUTO: 2.08 10*3/MM3 (ref 1.7–7)
NEUTROPHILS NFR BLD AUTO: 69.7 % (ref 42.7–76)
NRBC BLD AUTO-RTO: 0 /100 WBC (ref 0–0.2)
NT-PROBNP SERPL-MCNC: 484.2 PG/ML (ref 5–900)
PLATELET # BLD AUTO: 116 10*3/MM3 (ref 140–450)
PMV BLD AUTO: 11.7 FL (ref 6–12)
POTASSIUM BLD-SCNC: 4.5 MMOL/L (ref 3.5–5.2)
PROT SERPL-MCNC: 6.4 G/DL (ref 6–8.5)
RBC # BLD AUTO: 3.27 10*6/MM3 (ref 4.14–5.8)
SODIUM BLD-SCNC: 140 MMOL/L (ref 136–145)
TROPONIN T SERPL-MCNC: <0.01 NG/ML (ref 0–0.03)
WBC NRBC COR # BLD: 2.99 10*3/MM3 (ref 3.4–10.8)

## 2020-06-11 PROCEDURE — 82784 ASSAY IGA/IGD/IGG/IGM EACH: CPT

## 2020-06-11 PROCEDURE — 83883 ASSAY NEPHELOMETRY NOT SPEC: CPT

## 2020-06-11 PROCEDURE — 82306 VITAMIN D 25 HYDROXY: CPT

## 2020-06-11 PROCEDURE — 84156 ASSAY OF PROTEIN URINE: CPT

## 2020-06-11 PROCEDURE — 82570 ASSAY OF URINE CREATININE: CPT

## 2020-06-11 PROCEDURE — 84166 PROTEIN E-PHORESIS/URINE/CSF: CPT

## 2020-06-11 PROCEDURE — 80053 COMPREHEN METABOLIC PANEL: CPT

## 2020-06-11 PROCEDURE — 36415 COLL VENOUS BLD VENIPUNCTURE: CPT

## 2020-06-11 PROCEDURE — 83880 ASSAY OF NATRIURETIC PEPTIDE: CPT

## 2020-06-11 PROCEDURE — 85025 COMPLETE CBC W/AUTO DIFF WBC: CPT

## 2020-06-11 PROCEDURE — 86335 IMMUNFIX E-PHORSIS/URINE/CSF: CPT

## 2020-06-11 PROCEDURE — 84484 ASSAY OF TROPONIN QUANT: CPT

## 2020-06-12 LAB
COLLECT DURATION TIME UR: 24 HRS
CREAT UR-MCNC: 127.3 MG/DL
CREATINE 24H UR-MRATE: 1.65 G/24 HR (ref 1–2.4)
KAPPA LC SERPL-MCNC: 8.4 MG/L (ref 3.3–19.4)
KAPPA LC/LAMBDA SER: 0.89 {RATIO} (ref 0.26–1.65)
LAMBDA LC FREE SERPL-MCNC: 9.4 MG/L (ref 5.7–26.3)
PROT 24H UR-MRATE: 91 MG/24HOURS (ref 0–150)
SPECIMEN VOL 24H UR: 1300 ML

## 2020-06-15 LAB
ALBUMIN 24H MFR UR ELPH: 30.5 %
ALPHA1 GLOB 24H MFR UR ELPH: 4.5 %
ALPHA2 GLOB 24H MFR UR ELPH: 19 %
B-GLOBULIN MFR UR ELPH: 32.2 %
GAMMA GLOB 24H MFR UR ELPH: 13.8 %
HIV 1 & 2 AB SER-IMP: NORMAL
INTERPRETATION UR IFE-IMP: NORMAL
KAPPA LC UR-MCNC: 68.88 MG/L (ref 0.63–113.79)
KAPPA LC/LAMBDA UR: 12.59 {RATIO} (ref 1.03–31.76)
LAMBDA LC UR-MCNC: 5.47 MG/L (ref 0.47–11.77)
M PROTEIN 24H MFR UR ELPH: NORMAL %
PROT UR-MCNC: 34.4 MG/DL

## 2020-06-26 ENCOUNTER — LAB (OUTPATIENT)
Dept: LAB | Facility: HOSPITAL | Age: 65
End: 2020-06-26

## 2020-06-26 ENCOUNTER — TRANSCRIBE ORDERS (OUTPATIENT)
Dept: ADMINISTRATIVE | Facility: HOSPITAL | Age: 65
End: 2020-06-26

## 2020-06-26 DIAGNOSIS — E78.5 HYPERLIPIDEMIA, UNSPECIFIED HYPERLIPIDEMIA TYPE: ICD-10-CM

## 2020-06-26 DIAGNOSIS — E78.5 HYPERLIPIDEMIA, UNSPECIFIED HYPERLIPIDEMIA TYPE: Primary | ICD-10-CM

## 2020-06-26 LAB
ALBUMIN SERPL-MCNC: 4.6 G/DL (ref 3.5–5.2)
ALBUMIN/GLOB SERPL: 2.2 G/DL
ALP SERPL-CCNC: 62 U/L (ref 39–117)
ALT SERPL W P-5'-P-CCNC: 14 U/L (ref 1–41)
ANION GAP SERPL CALCULATED.3IONS-SCNC: 7.8 MMOL/L (ref 5–15)
AST SERPL-CCNC: 19 U/L (ref 1–40)
BILIRUB SERPL-MCNC: 0.3 MG/DL (ref 0.2–1.2)
BUN BLD-MCNC: 32 MG/DL (ref 8–23)
BUN/CREAT SERPL: 20.9 (ref 7–25)
CALCIUM SPEC-SCNC: 9.3 MG/DL (ref 8.6–10.5)
CHLORIDE SERPL-SCNC: 106 MMOL/L (ref 98–107)
CHOLEST SERPL-MCNC: 162 MG/DL (ref 0–200)
CO2 SERPL-SCNC: 23.2 MMOL/L (ref 22–29)
CREAT BLD-MCNC: 1.53 MG/DL (ref 0.76–1.27)
GFR SERPL CREATININE-BSD FRML MDRD: 46 ML/MIN/1.73
GLOBULIN UR ELPH-MCNC: 2.1 GM/DL
GLUCOSE BLD-MCNC: 171 MG/DL (ref 65–99)
HDLC SERPL-MCNC: 40 MG/DL (ref 40–60)
LDLC SERPL CALC-MCNC: 89 MG/DL (ref 0–100)
LDLC/HDLC SERPL: 2.22 {RATIO}
POTASSIUM BLD-SCNC: 5.3 MMOL/L (ref 3.5–5.2)
PROT SERPL-MCNC: 6.7 G/DL (ref 6–8.5)
SODIUM BLD-SCNC: 137 MMOL/L (ref 136–145)
TRIGL SERPL-MCNC: 166 MG/DL (ref 0–150)
VLDLC SERPL-MCNC: 33.2 MG/DL

## 2020-06-26 PROCEDURE — 80053 COMPREHEN METABOLIC PANEL: CPT

## 2020-06-26 PROCEDURE — 36415 COLL VENOUS BLD VENIPUNCTURE: CPT

## 2020-06-26 PROCEDURE — 80061 LIPID PANEL: CPT

## 2020-08-07 ENCOUNTER — TRANSCRIBE ORDERS (OUTPATIENT)
Dept: ADMINISTRATIVE | Facility: HOSPITAL | Age: 65
End: 2020-08-07

## 2020-08-07 ENCOUNTER — LAB (OUTPATIENT)
Dept: LAB | Facility: HOSPITAL | Age: 65
End: 2020-08-07

## 2020-08-07 DIAGNOSIS — E78.5 HYPERLIPIDEMIA, UNSPECIFIED HYPERLIPIDEMIA TYPE: ICD-10-CM

## 2020-08-07 DIAGNOSIS — E78.5 HYPERLIPIDEMIA, UNSPECIFIED HYPERLIPIDEMIA TYPE: Primary | ICD-10-CM

## 2020-08-07 LAB
ALBUMIN SERPL-MCNC: 4.2 G/DL (ref 3.5–5.2)
ALBUMIN/GLOB SERPL: 1.9 G/DL
ALP SERPL-CCNC: 62 U/L (ref 39–117)
ALT SERPL W P-5'-P-CCNC: 18 U/L (ref 1–41)
ANION GAP SERPL CALCULATED.3IONS-SCNC: 9.9 MMOL/L (ref 5–15)
AST SERPL-CCNC: 20 U/L (ref 1–40)
BILIRUB SERPL-MCNC: 0.5 MG/DL (ref 0–1.2)
BUN SERPL-MCNC: 27 MG/DL (ref 8–23)
BUN/CREAT SERPL: 15.2 (ref 7–25)
CALCIUM SPEC-SCNC: 9.2 MG/DL (ref 8.6–10.5)
CHLORIDE SERPL-SCNC: 106 MMOL/L (ref 98–107)
CHOLEST SERPL-MCNC: 184 MG/DL (ref 0–200)
CO2 SERPL-SCNC: 23.1 MMOL/L (ref 22–29)
CREAT SERPL-MCNC: 1.78 MG/DL (ref 0.76–1.27)
GFR SERPL CREATININE-BSD FRML MDRD: 39 ML/MIN/1.73
GLOBULIN UR ELPH-MCNC: 2.2 GM/DL
GLUCOSE SERPL-MCNC: 177 MG/DL (ref 65–99)
HDLC SERPL-MCNC: 37 MG/DL (ref 40–60)
LDLC SERPL CALC-MCNC: 115 MG/DL (ref 0–100)
LDLC/HDLC SERPL: 3.11 {RATIO}
POTASSIUM SERPL-SCNC: 4.8 MMOL/L (ref 3.5–5.2)
PROT SERPL-MCNC: 6.4 G/DL (ref 6–8.5)
SODIUM SERPL-SCNC: 139 MMOL/L (ref 136–145)
TRIGL SERPL-MCNC: 159 MG/DL (ref 0–150)
VLDLC SERPL-MCNC: 31.8 MG/DL (ref 5–40)

## 2020-08-07 PROCEDURE — 36415 COLL VENOUS BLD VENIPUNCTURE: CPT

## 2020-08-07 PROCEDURE — 80061 LIPID PANEL: CPT

## 2020-08-07 PROCEDURE — 80053 COMPREHEN METABOLIC PANEL: CPT

## 2020-08-28 ENCOUNTER — TRANSCRIBE ORDERS (OUTPATIENT)
Dept: ADMINISTRATIVE | Facility: HOSPITAL | Age: 65
End: 2020-08-28

## 2020-08-28 ENCOUNTER — LAB (OUTPATIENT)
Dept: LAB | Facility: HOSPITAL | Age: 65
End: 2020-08-28

## 2020-08-28 DIAGNOSIS — Z51.81 ENCOUNTER FOR THERAPEUTIC DRUG MONITORING: Primary | ICD-10-CM

## 2020-08-28 DIAGNOSIS — Z51.81 ENCOUNTER FOR THERAPEUTIC DRUG MONITORING: ICD-10-CM

## 2020-08-28 PROCEDURE — 80197 ASSAY OF TACROLIMUS: CPT

## 2020-08-28 PROCEDURE — 36415 COLL VENOUS BLD VENIPUNCTURE: CPT

## 2020-09-04 LAB — TACROLIMUS BLD-MCNC: NORMAL NG/ML

## 2020-09-17 ENCOUNTER — HOSPITAL ENCOUNTER (EMERGENCY)
Facility: HOSPITAL | Age: 65
Discharge: HOME | End: 2020-09-17
Attending: EMERGENCY MEDICINE | Admitting: EMERGENCY MEDICINE

## 2020-09-17 ENCOUNTER — APPOINTMENT (OUTPATIENT)
Dept: GENERAL RADIOLOGY | Facility: HOSPITAL | Age: 65
End: 2020-09-17

## 2020-09-17 ENCOUNTER — APPOINTMENT (OUTPATIENT)
Dept: CT IMAGING | Facility: HOSPITAL | Age: 65
End: 2020-09-17

## 2020-09-17 VITALS
BODY MASS INDEX: 22.9 KG/M2 | HEART RATE: 81 BPM | DIASTOLIC BLOOD PRESSURE: 101 MMHG | HEIGHT: 70 IN | OXYGEN SATURATION: 99 % | RESPIRATION RATE: 18 BRPM | SYSTOLIC BLOOD PRESSURE: 176 MMHG | TEMPERATURE: 98.4 F | WEIGHT: 160 LBS

## 2020-09-17 DIAGNOSIS — S00.81XA ABRASION OF FACE, INITIAL ENCOUNTER: ICD-10-CM
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
## 2020-09-17 DIAGNOSIS — S16.1XXA STRAIN OF NECK MUSCLE, INITIAL ENCOUNTER: ICD-10-CM

## 2020-09-17 PROCEDURE — 72125 CT NECK SPINE W/O DYE: CPT

## 2020-09-17 PROCEDURE — 99283 EMERGENCY DEPT VISIT LOW MDM: CPT

## 2020-09-17 PROCEDURE — 71045 X-RAY EXAM CHEST 1 VIEW: CPT

## 2020-09-17 PROCEDURE — 70486 CT MAXILLOFACIAL W/O DYE: CPT

## 2020-09-17 PROCEDURE — 70450 CT HEAD/BRAIN W/O DYE: CPT

## 2020-10-28 NOTE — PLAN OF CARE
Problem: Patient Care Overview  Goal: Plan of Care Review  Outcome: Ongoing (interventions implemented as appropriate)   10/12/18 0522   Plan of Care Review   Progress no change     Goal: Discharge Needs Assessment  Outcome: Ongoing (interventions implemented as appropriate)         Statement Selected

## 2020-11-17 ENCOUNTER — TRANSCRIBE ORDERS (OUTPATIENT)
Dept: ADMINISTRATIVE | Facility: HOSPITAL | Age: 65
End: 2020-11-17

## 2020-11-17 ENCOUNTER — LAB (OUTPATIENT)
Dept: LAB | Facility: HOSPITAL | Age: 65
End: 2020-11-17

## 2020-11-17 DIAGNOSIS — Z94.1 HEART REPLACED BY TRANSPLANT (HCC): ICD-10-CM

## 2020-11-17 DIAGNOSIS — Z94.1 HEART REPLACED BY TRANSPLANT (HCC): Primary | ICD-10-CM

## 2020-11-17 LAB
BASOPHILS # BLD AUTO: 0.02 10*3/MM3 (ref 0–0.2)
BASOPHILS NFR BLD AUTO: 0.6 % (ref 0–1.5)
DEPRECATED RDW RBC AUTO: 41.5 FL (ref 37–54)
EOSINOPHIL # BLD AUTO: 0.06 10*3/MM3 (ref 0–0.4)
EOSINOPHIL NFR BLD AUTO: 1.9 % (ref 0.3–6.2)
ERYTHROCYTE [DISTWIDTH] IN BLOOD BY AUTOMATED COUNT: 12.6 % (ref 12.3–15.4)
HCT VFR BLD AUTO: 35.5 % (ref 37.5–51)
HGB BLD-MCNC: 11.7 G/DL (ref 13–17.7)
IMM GRANULOCYTES # BLD AUTO: 0.15 10*3/MM3 (ref 0–0.05)
IMM GRANULOCYTES NFR BLD AUTO: 4.6 % (ref 0–0.5)
LYMPHOCYTES # BLD AUTO: 0.65 10*3/MM3 (ref 0.7–3.1)
LYMPHOCYTES NFR BLD AUTO: 20.1 % (ref 19.6–45.3)
MCH RBC QN AUTO: 29.8 PG (ref 26.6–33)
MCHC RBC AUTO-ENTMCNC: 33 G/DL (ref 31.5–35.7)
MCV RBC AUTO: 90.3 FL (ref 79–97)
MONOCYTES # BLD AUTO: 0.36 10*3/MM3 (ref 0.1–0.9)
MONOCYTES NFR BLD AUTO: 11.1 % (ref 5–12)
NEUTROPHILS NFR BLD AUTO: 1.99 10*3/MM3 (ref 1.7–7)
NEUTROPHILS NFR BLD AUTO: 61.7 % (ref 42.7–76)
NRBC BLD AUTO-RTO: 0 /100 WBC (ref 0–0.2)
PLATELET # BLD AUTO: 114 10*3/MM3 (ref 140–450)
PMV BLD AUTO: 11.6 FL (ref 6–12)
RBC # BLD AUTO: 3.93 10*6/MM3 (ref 4.14–5.8)
WBC # BLD AUTO: 3.23 10*3/MM3 (ref 3.4–10.8)

## 2020-11-17 PROCEDURE — 85025 COMPLETE CBC W/AUTO DIFF WBC: CPT

## 2020-11-17 PROCEDURE — 36415 COLL VENOUS BLD VENIPUNCTURE: CPT

## 2020-11-17 PROCEDURE — 80197 ASSAY OF TACROLIMUS: CPT

## 2020-11-23 LAB — TACROLIMUS BLD-MCNC: NORMAL NG/ML

## 2020-12-09 ENCOUNTER — LAB (OUTPATIENT)
Dept: LAB | Facility: HOSPITAL | Age: 65
End: 2020-12-09

## 2020-12-09 ENCOUNTER — TRANSCRIBE ORDERS (OUTPATIENT)
Dept: ADMINISTRATIVE | Facility: HOSPITAL | Age: 65
End: 2020-12-09

## 2020-12-09 DIAGNOSIS — Z94.1 HEART REPLACED BY TRANSPLANT (HCC): ICD-10-CM

## 2020-12-09 DIAGNOSIS — Z94.1 HEART REPLACED BY TRANSPLANT (HCC): Primary | ICD-10-CM

## 2020-12-09 LAB
DEPRECATED RDW RBC AUTO: 46.1 FL (ref 37–54)
EOSINOPHIL # BLD MANUAL: 0.04 10*3/MM3 (ref 0–0.4)
EOSINOPHIL NFR BLD MANUAL: 1 % (ref 0.3–6.2)
ERYTHROCYTE [DISTWIDTH] IN BLOOD BY AUTOMATED COUNT: 13.4 % (ref 12.3–15.4)
GIANT PLATELETS: ABNORMAL
HCT VFR BLD AUTO: 35.3 % (ref 37.5–51)
HGB BLD-MCNC: 11.4 G/DL (ref 13–17.7)
LYMPHOCYTES # BLD MANUAL: 0.51 10*3/MM3 (ref 0.7–3.1)
LYMPHOCYTES NFR BLD MANUAL: 14.1 % (ref 19.6–45.3)
LYMPHOCYTES NFR BLD MANUAL: 15.2 % (ref 5–12)
MCH RBC QN AUTO: 30 PG (ref 26.6–33)
MCHC RBC AUTO-ENTMCNC: 32.3 G/DL (ref 31.5–35.7)
MCV RBC AUTO: 92.9 FL (ref 79–97)
MONOCYTES # BLD AUTO: 0.55 10*3/MM3 (ref 0.1–0.9)
NEUTROPHILS # BLD AUTO: 2.52 10*3/MM3 (ref 1.7–7)
NEUTROPHILS NFR BLD MANUAL: 69.7 % (ref 42.7–76)
PLATELET # BLD AUTO: 93 10*3/MM3 (ref 140–450)
PMV BLD AUTO: 11.6 FL (ref 6–12)
RBC # BLD AUTO: 3.8 10*6/MM3 (ref 4.14–5.8)
RBC MORPH BLD: NORMAL
WBC # BLD AUTO: 3.62 10*3/MM3 (ref 3.4–10.8)
WBC MORPH BLD: NORMAL

## 2020-12-09 PROCEDURE — 85007 BL SMEAR W/DIFF WBC COUNT: CPT

## 2020-12-09 PROCEDURE — 36415 COLL VENOUS BLD VENIPUNCTURE: CPT

## 2020-12-09 PROCEDURE — 85025 COMPLETE CBC W/AUTO DIFF WBC: CPT

## 2021-02-09 ENCOUNTER — TRANSCRIBE ORDERS (OUTPATIENT)
Dept: ADMINISTRATIVE | Facility: HOSPITAL | Age: 66
End: 2021-02-09

## 2021-02-09 ENCOUNTER — LAB (OUTPATIENT)
Dept: LAB | Facility: HOSPITAL | Age: 66
End: 2021-02-09

## 2021-02-09 DIAGNOSIS — R06.02 SHORTNESS OF BREATH: ICD-10-CM

## 2021-02-09 DIAGNOSIS — R06.02 SHORTNESS OF BREATH: Primary | ICD-10-CM

## 2021-02-09 LAB
ANION GAP SERPL CALCULATED.3IONS-SCNC: 10.5 MMOL/L (ref 5–15)
BUN SERPL-MCNC: 33 MG/DL (ref 8–23)
BUN/CREAT SERPL: 18.3 (ref 7–25)
CALCIUM SPEC-SCNC: 9.3 MG/DL (ref 8.6–10.5)
CHLORIDE SERPL-SCNC: 103 MMOL/L (ref 98–107)
CO2 SERPL-SCNC: 25.5 MMOL/L (ref 22–29)
CREAT SERPL-MCNC: 1.8 MG/DL (ref 0.76–1.27)
GFR SERPL CREATININE-BSD FRML MDRD: 38 ML/MIN/1.73
GLUCOSE SERPL-MCNC: 198 MG/DL (ref 65–99)
NT-PROBNP SERPL-MCNC: 206.6 PG/ML (ref 0–900)
POTASSIUM SERPL-SCNC: 4.4 MMOL/L (ref 3.5–5.2)
SODIUM SERPL-SCNC: 139 MMOL/L (ref 136–145)

## 2021-02-09 PROCEDURE — 80048 BASIC METABOLIC PNL TOTAL CA: CPT

## 2021-02-09 PROCEDURE — 83880 ASSAY OF NATRIURETIC PEPTIDE: CPT

## 2021-02-09 PROCEDURE — 36415 COLL VENOUS BLD VENIPUNCTURE: CPT

## 2021-02-16 ENCOUNTER — IMMUNIZATION (OUTPATIENT)
Dept: VACCINE CLINIC | Facility: HOSPITAL | Age: 66
End: 2021-02-16

## 2021-02-16 PROCEDURE — 91300 HC SARSCOV02 VAC 30MCG/0.3ML IM: CPT | Performed by: INTERNAL MEDICINE

## 2021-02-16 PROCEDURE — 0001A: CPT | Performed by: INTERNAL MEDICINE

## 2021-02-25 ENCOUNTER — LAB (OUTPATIENT)
Dept: LAB | Facility: HOSPITAL | Age: 66
End: 2021-02-25

## 2021-02-25 ENCOUNTER — TRANSCRIBE ORDERS (OUTPATIENT)
Dept: ADMINISTRATIVE | Facility: HOSPITAL | Age: 66
End: 2021-02-25

## 2021-02-25 DIAGNOSIS — R06.02 SHORTNESS OF BREATH: Primary | ICD-10-CM

## 2021-02-25 DIAGNOSIS — R06.02 SHORTNESS OF BREATH: ICD-10-CM

## 2021-02-25 LAB — NT-PROBNP SERPL-MCNC: 199.3 PG/ML (ref 0–900)

## 2021-02-25 PROCEDURE — 36415 COLL VENOUS BLD VENIPUNCTURE: CPT

## 2021-02-25 PROCEDURE — 83880 ASSAY OF NATRIURETIC PEPTIDE: CPT

## 2021-03-03 ENCOUNTER — TRANSCRIBE ORDERS (OUTPATIENT)
Dept: ADMINISTRATIVE | Facility: HOSPITAL | Age: 66
End: 2021-03-03

## 2021-03-03 ENCOUNTER — LAB (OUTPATIENT)
Dept: LAB | Facility: HOSPITAL | Age: 66
End: 2021-03-03

## 2021-03-03 DIAGNOSIS — Z94.1 HEART REPLACED BY TRANSPLANT (HCC): Primary | ICD-10-CM

## 2021-03-03 DIAGNOSIS — Z94.1 HEART REPLACED BY TRANSPLANT (HCC): ICD-10-CM

## 2021-03-03 LAB
ANION GAP SERPL CALCULATED.3IONS-SCNC: 9.4 MMOL/L (ref 5–15)
BUN SERPL-MCNC: 51 MG/DL (ref 8–23)
BUN/CREAT SERPL: 25.6 (ref 7–25)
CALCIUM SPEC-SCNC: 9.5 MG/DL (ref 8.6–10.5)
CHLORIDE SERPL-SCNC: 96 MMOL/L (ref 98–107)
CO2 SERPL-SCNC: 28.6 MMOL/L (ref 22–29)
CREAT SERPL-MCNC: 1.99 MG/DL (ref 0.76–1.27)
GFR SERPL CREATININE-BSD FRML MDRD: 34 ML/MIN/1.73
GLUCOSE SERPL-MCNC: 405 MG/DL (ref 65–99)
POTASSIUM SERPL-SCNC: 4.4 MMOL/L (ref 3.5–5.2)
SODIUM SERPL-SCNC: 134 MMOL/L (ref 136–145)

## 2021-03-03 PROCEDURE — 36415 COLL VENOUS BLD VENIPUNCTURE: CPT

## 2021-03-03 PROCEDURE — 80048 BASIC METABOLIC PNL TOTAL CA: CPT

## 2021-03-09 ENCOUNTER — IMMUNIZATION (OUTPATIENT)
Dept: VACCINE CLINIC | Facility: HOSPITAL | Age: 66
End: 2021-03-09

## 2021-03-09 PROCEDURE — 91300 HC SARSCOV02 VAC 30MCG/0.3ML IM: CPT | Performed by: INTERNAL MEDICINE

## 2021-03-09 PROCEDURE — 0002A: CPT | Performed by: INTERNAL MEDICINE

## 2021-06-30 ENCOUNTER — TRANSCRIBE ORDERS (OUTPATIENT)
Dept: ADMINISTRATIVE | Facility: HOSPITAL | Age: 66
End: 2021-06-30

## 2021-06-30 ENCOUNTER — LAB (OUTPATIENT)
Dept: LAB | Facility: HOSPITAL | Age: 66
End: 2021-06-30

## 2021-06-30 DIAGNOSIS — E78.5 HYPERLIPIDEMIA, UNSPECIFIED HYPERLIPIDEMIA TYPE: ICD-10-CM

## 2021-06-30 DIAGNOSIS — E78.5 HYPERLIPIDEMIA, UNSPECIFIED HYPERLIPIDEMIA TYPE: Primary | ICD-10-CM

## 2021-06-30 LAB
ALBUMIN SERPL-MCNC: 4.7 G/DL (ref 3.5–5.2)
ALBUMIN/GLOB SERPL: 2.1 G/DL
ALP SERPL-CCNC: 72 U/L (ref 39–117)
ALT SERPL W P-5'-P-CCNC: 17 U/L (ref 1–41)
ANION GAP SERPL CALCULATED.3IONS-SCNC: 10.1 MMOL/L (ref 5–15)
AST SERPL-CCNC: 15 U/L (ref 1–40)
BILIRUB SERPL-MCNC: 0.4 MG/DL (ref 0–1.2)
BUN SERPL-MCNC: 30 MG/DL (ref 8–23)
BUN/CREAT SERPL: 18 (ref 7–25)
CALCIUM SPEC-SCNC: 9.1 MG/DL (ref 8.6–10.5)
CHLORIDE SERPL-SCNC: 106 MMOL/L (ref 98–107)
CHOLEST SERPL-MCNC: 169 MG/DL (ref 0–200)
CO2 SERPL-SCNC: 23.9 MMOL/L (ref 22–29)
CREAT SERPL-MCNC: 1.67 MG/DL (ref 0.76–1.27)
GFR SERPL CREATININE-BSD FRML MDRD: 41 ML/MIN/1.73
GLOBULIN UR ELPH-MCNC: 2.2 GM/DL
GLUCOSE SERPL-MCNC: 187 MG/DL (ref 65–99)
HDLC SERPL-MCNC: 34 MG/DL (ref 40–60)
LDLC SERPL CALC-MCNC: 102 MG/DL (ref 0–100)
LDLC/HDLC SERPL: 2.85 {RATIO}
POTASSIUM SERPL-SCNC: 4.7 MMOL/L (ref 3.5–5.2)
PROT SERPL-MCNC: 6.9 G/DL (ref 6–8.5)
SODIUM SERPL-SCNC: 140 MMOL/L (ref 136–145)
TRIGL SERPL-MCNC: 191 MG/DL (ref 0–150)
VLDLC SERPL-MCNC: 33 MG/DL (ref 5–40)

## 2021-06-30 PROCEDURE — 80061 LIPID PANEL: CPT

## 2021-06-30 PROCEDURE — 80053 COMPREHEN METABOLIC PANEL: CPT

## 2021-06-30 PROCEDURE — 36415 COLL VENOUS BLD VENIPUNCTURE: CPT

## 2022-03-11 ENCOUNTER — TRANSCRIBE ORDERS (OUTPATIENT)
Dept: ADMINISTRATIVE | Facility: HOSPITAL | Age: 67
End: 2022-03-11

## 2022-03-11 ENCOUNTER — LAB (OUTPATIENT)
Dept: LAB | Facility: HOSPITAL | Age: 67
End: 2022-03-11

## 2022-03-11 DIAGNOSIS — Z94.1 HEART REPLACED BY TRANSPLANT: ICD-10-CM

## 2022-03-11 DIAGNOSIS — Z94.1 HEART REPLACED BY TRANSPLANT: Primary | ICD-10-CM

## 2022-03-11 LAB
ANION GAP SERPL CALCULATED.3IONS-SCNC: 11 MMOL/L (ref 5–15)
BUN SERPL-MCNC: 73 MG/DL (ref 8–23)
BUN/CREAT SERPL: 30.3 (ref 7–25)
CALCIUM SPEC-SCNC: 8.9 MG/DL (ref 8.6–10.5)
CHLORIDE SERPL-SCNC: 107 MMOL/L (ref 98–107)
CO2 SERPL-SCNC: 20 MMOL/L (ref 22–29)
CREAT SERPL-MCNC: 2.41 MG/DL (ref 0.76–1.27)
EGFRCR SERPLBLD CKD-EPI 2021: 28.9 ML/MIN/1.73
GLUCOSE SERPL-MCNC: 155 MG/DL (ref 65–99)
POTASSIUM SERPL-SCNC: 4.4 MMOL/L (ref 3.5–5.2)
SODIUM SERPL-SCNC: 138 MMOL/L (ref 136–145)

## 2022-03-11 PROCEDURE — 80048 BASIC METABOLIC PNL TOTAL CA: CPT

## 2022-03-11 PROCEDURE — 80197 ASSAY OF TACROLIMUS: CPT

## 2022-03-11 PROCEDURE — 80180 DRUG SCRN QUAN MYCOPHENOLATE: CPT

## 2022-03-11 PROCEDURE — 36415 COLL VENOUS BLD VENIPUNCTURE: CPT

## 2022-03-14 LAB
CMV DNA SERPL NAA+PROBE-ACNC: NEGATIVE IU/ML
CMV DNA SERPL NAA+PROBE-LOG IU: NORMAL {LOG_IU}/ML
TACROLIMUS BLD-MCNC: NORMAL NG/ML

## 2022-03-15 LAB
MYCOPHENOLATE SERPL-MCNC: 1.6 UG/ML (ref 1–3.5)
MYCOPHENOLATE-G SERPL-MCNC: 49 UG/ML (ref 15–125)

## 2022-08-09 NOTE — PROGRESS NOTES
Billy Bourne  1955  Date of Office Visit: 12/01/2017  Encounter Provider: Wm Deleon MD  Place of Service: [facility] CARDIOLOGY      CHIEF COMPLAINT:  Syncope    HISTORY OF PRESENT ILLNESS:Dr. Stuart,     I had the pleasure of seeing your patient Billy Bourne in consultation today secondary to his episode of loss of consciousness.  As you well know, he is a very pleasant 62-year-old gentleman with a medical history of diabetes mellitus, hypertension, and hyperlipidemia who was in the emergency room on 10/29/2017 with a report of loss of consciousness.  He was in his usual state of health, and walking through the mall waiting on his wife when he began to have what he describes as blurry vision.  He adjusted his glasses and kept walking, but that is about the last thing he remembers.  He apparently lost consciousness.  EMS was called.  He apparently had what sounds to be a clonic movement of his arms and legs.  He did not have loss of bowel or bladder control.  It seems he did at least grind his teeth and possibly bite his tongue as well based on your exam.  He does not remember any of that.  He had no chest pain or tachycardia prior to.  He states that the first thing he remembers is being in the ambulance.  He had a workup including a CT scan of the head and electrocardiogram along with labs; all of which were unrevealing.  He has been referred to neurology.      Since that time he has had no recurrence of those symptoms.  He denies any chest pain, orthopnea, PND or lower extremity edema.          Review of Systems   Constitution: Negative for fever, weakness and malaise/fatigue.   HENT: Negative for nosebleeds and sore throat.    Eyes: Negative for blurred vision and double vision.   Cardiovascular: Negative for chest pain, claudication, palpitations and syncope.   Respiratory: Negative for cough, shortness of breath and snoring.    Endocrine: Negative for cold  "intolerance, heat intolerance and polydipsia.   Skin: Negative for itching, poor wound healing and rash.   Musculoskeletal: Negative for joint pain, joint swelling, muscle weakness and myalgias.   Gastrointestinal: Negative for abdominal pain, melena, nausea and vomiting.   Neurological: Negative for light-headedness, loss of balance, seizures and vertigo.   Psychiatric/Behavioral: Negative for altered mental status and depression.       Past Medical History:   Diagnosis Date   • Colon polyps    • Diabetes mellitus    • Hyperlipidemia    • Hypertension    • Seizures    • Syncope        The following portions of the patient's history were reviewed and updated as appropriate: Social history , Family history and Surgical history     Current Outpatient Prescriptions on File Prior to Visit   Medication Sig Dispense Refill   • atorvastatin (LIPITOR) 20 MG tablet      • benazepril (LOTENSIN) 20 MG tablet TAKE 1 TABLET TWICE DAILY. 30 tablet 0   • glimepiride (AMARYL) 2 MG tablet      • SYNJARDY XR 12.5-1000 MG tablet sustained-release 24 hour        No current facility-administered medications on file prior to visit.        No Known Allergies    Vitals:    12/01/17 0939 12/01/17 0941 12/01/17 0942 12/01/17 0943   BP: 146/90 146/90 144/88 140/88   Patient Position:  Lying Sitting Standing   Pulse: 88 88 86 86   Weight: 199 lb (90.3 kg)      Height: 71\" (180.3 cm)        Physical Exam   Constitutional: He is oriented to person, place, and time. He appears well-developed and well-nourished.   HENT:   Head: Normocephalic and atraumatic.   Eyes: Conjunctivae and EOM are normal. No scleral icterus.   Neck: Normal range of motion. Neck supple. Normal carotid pulses, no hepatojugular reflux and no JVD present. Carotid bruit is not present. No tracheal deviation present. No thyromegaly present.   Cardiovascular: Normal rate and regular rhythm.  Exam reveals no gallop and no friction rub.    No murmur heard.  Pulses:       Carotid " There are no Wet Read(s) to document. pulses are 2+ on the right side, and 2+ on the left side.       Radial pulses are 2+ on the right side, and 2+ on the left side.        Femoral pulses are 2+ on the right side, and 2+ on the left side.       Dorsalis pedis pulses are 2+ on the right side, and 2+ on the left side.        Posterior tibial pulses are 2+ on the right side, and 2+ on the left side.   Pulmonary/Chest: Breath sounds normal. No respiratory distress. He has no decreased breath sounds. He has no wheezes. He has no rhonchi. He has no rales. He exhibits no tenderness.   Abdominal: Soft. Bowel sounds are normal. He exhibits no distension. There is no tenderness. There is no rebound.   Musculoskeletal: He exhibits no edema or deformity.   Neurological: He is alert and oriented to person, place, and time. He has normal strength. No sensory deficit.   Skin: No rash noted. No erythema.   Psychiatric: He has a normal mood and affect. His behavior is normal.     No components found for: CBC  No results found for: CMP  No components found for: LIPID  No results found for: BMP      ECG 12 Lead  Date/Time: 12/1/2017 12:51 PM  Performed by: RODRIGO FERRIS  Authorized by: RODRIGO FERRIS   Comparison: compared with previous ECG from 10/29/2017  Similar to previous ECG  Rhythm: sinus rhythm  Rate: normal  ST Segments: ST segments normal  T Waves: T waves normal  Other findings: LAE  Clinical impression: normal ECG            DISCUSSION/SUMMARYHe is a very pleasant 62-year-old gentleman with a medical history as documented above including hypertension, hyperlipidemia and diabetes mellitus who presented with an episode of loss of consciousness while he was walking.  It does not sound to be orthostatic in etiology.  He had no tachycardia or palpitations prior to and had no angina.  Although arrhythmia at this time cannot be ruled out, I think that that or valvular heart disease as an etiology for his loss of consciousness would be a little  unlikely considering his story.  He did have some prodrome and it sounds like he may have had some ictal activity; however, that evaluation is still pending with neurology.      1. Syncope/loss of consciousness.  I think getting an echocardiogram and a two week Zio patch to rule out high grade AV block or ventricular arrhythmias would be reasonable.  No indication for ischemic evaluation at this point in time.

## 2022-08-17 ENCOUNTER — LAB (OUTPATIENT)
Dept: LAB | Facility: HOSPITAL | Age: 67
End: 2022-08-17

## 2022-08-17 ENCOUNTER — TRANSCRIBE ORDERS (OUTPATIENT)
Dept: LAB | Facility: HOSPITAL | Age: 67
End: 2022-08-17

## 2022-08-17 DIAGNOSIS — Z94.1 HEART REPLACED BY TRANSPLANT: ICD-10-CM

## 2022-08-17 DIAGNOSIS — Z94.1 HEART REPLACED BY TRANSPLANT: Primary | ICD-10-CM

## 2022-08-17 PROCEDURE — 80197 ASSAY OF TACROLIMUS: CPT

## 2022-08-17 PROCEDURE — 36415 COLL VENOUS BLD VENIPUNCTURE: CPT

## 2022-08-19 LAB — TACROLIMUS BLD-MCNC: NORMAL NG/ML

## 2022-09-01 ENCOUNTER — TRANSCRIBE ORDERS (OUTPATIENT)
Dept: LAB | Facility: HOSPITAL | Age: 67
End: 2022-09-01

## 2022-09-01 ENCOUNTER — LAB (OUTPATIENT)
Dept: LAB | Facility: HOSPITAL | Age: 67
End: 2022-09-01

## 2022-09-01 DIAGNOSIS — Z94.1 HEART REPLACED BY TRANSPLANT: Primary | ICD-10-CM

## 2022-09-01 DIAGNOSIS — Z94.1 HEART REPLACED BY TRANSPLANT: ICD-10-CM

## 2022-09-01 PROCEDURE — 80197 ASSAY OF TACROLIMUS: CPT

## 2022-09-01 PROCEDURE — 36415 COLL VENOUS BLD VENIPUNCTURE: CPT

## 2022-09-06 LAB — TACROLIMUS BLD LC/MS/MS-MCNC: 4.6 NG/ML (ref 2–20)

## 2022-10-18 ENCOUNTER — LAB (OUTPATIENT)
Dept: LAB | Facility: HOSPITAL | Age: 67
End: 2022-10-18

## 2022-10-18 ENCOUNTER — TRANSCRIBE ORDERS (OUTPATIENT)
Dept: ADMINISTRATIVE | Facility: HOSPITAL | Age: 67
End: 2022-10-18

## 2022-10-18 DIAGNOSIS — Z94.1 HEART REPLACED BY TRANSPLANT: Primary | ICD-10-CM

## 2022-10-18 DIAGNOSIS — Z94.1 HEART REPLACED BY TRANSPLANT: ICD-10-CM

## 2022-10-18 PROCEDURE — 36415 COLL VENOUS BLD VENIPUNCTURE: CPT

## 2022-10-18 PROCEDURE — 80197 ASSAY OF TACROLIMUS: CPT

## 2022-10-20 LAB — TACROLIMUS BLD-MCNC: NORMAL NG/ML

## 2023-02-09 ENCOUNTER — LAB (OUTPATIENT)
Dept: LAB | Facility: HOSPITAL | Age: 68
End: 2023-02-09
Payer: COMMERCIAL

## 2023-02-09 ENCOUNTER — TRANSCRIBE ORDERS (OUTPATIENT)
Dept: ADMINISTRATIVE | Facility: HOSPITAL | Age: 68
End: 2023-02-09
Payer: COMMERCIAL

## 2023-02-09 DIAGNOSIS — Z94.1 HEART REPLACED BY TRANSPLANT: ICD-10-CM

## 2023-02-09 DIAGNOSIS — Z94.1 HEART REPLACED BY TRANSPLANT: Primary | ICD-10-CM

## 2023-02-09 LAB — NT-PROBNP SERPL-MCNC: 2162 PG/ML (ref 0–900)

## 2023-02-09 PROCEDURE — 80197 ASSAY OF TACROLIMUS: CPT

## 2023-02-09 PROCEDURE — 83880 ASSAY OF NATRIURETIC PEPTIDE: CPT

## 2023-02-09 PROCEDURE — 36415 COLL VENOUS BLD VENIPUNCTURE: CPT

## 2023-02-10 LAB — TACROLIMUS BLD-MCNC: NORMAL NG/ML

## 2023-02-17 ENCOUNTER — TRANSCRIBE ORDERS (OUTPATIENT)
Dept: ADMINISTRATIVE | Facility: HOSPITAL | Age: 68
End: 2023-02-17
Payer: COMMERCIAL

## 2023-02-17 ENCOUNTER — LAB (OUTPATIENT)
Dept: LAB | Facility: HOSPITAL | Age: 68
End: 2023-02-17
Payer: COMMERCIAL

## 2023-02-17 DIAGNOSIS — Z94.1 HEART REPLACED BY TRANSPLANT: ICD-10-CM

## 2023-02-17 DIAGNOSIS — R06.02 SHORTNESS OF BREATH: ICD-10-CM

## 2023-02-17 DIAGNOSIS — R06.02 SHORTNESS OF BREATH: Primary | ICD-10-CM

## 2023-02-17 LAB — NT-PROBNP SERPL-MCNC: 2891 PG/ML (ref 0–900)

## 2023-02-17 PROCEDURE — 80197 ASSAY OF TACROLIMUS: CPT

## 2023-02-17 PROCEDURE — 36415 COLL VENOUS BLD VENIPUNCTURE: CPT

## 2023-02-17 PROCEDURE — 83880 ASSAY OF NATRIURETIC PEPTIDE: CPT

## 2023-02-20 LAB — TACROLIMUS BLD LC/MS/MS-MCNC: 7.2 NG/ML (ref 2–20)

## 2023-03-06 ENCOUNTER — TRANSCRIBE ORDERS (OUTPATIENT)
Dept: ADMINISTRATIVE | Facility: HOSPITAL | Age: 68
End: 2023-03-06
Payer: COMMERCIAL

## 2023-03-06 ENCOUNTER — LAB (OUTPATIENT)
Dept: LAB | Facility: HOSPITAL | Age: 68
End: 2023-03-06
Payer: COMMERCIAL

## 2023-03-06 DIAGNOSIS — Z92.25 STATUS POST RECENT IMMUNOSUPPRESSIVE THERAPY: Primary | ICD-10-CM

## 2023-03-06 DIAGNOSIS — Z92.25 STATUS POST RECENT IMMUNOSUPPRESSIVE THERAPY: ICD-10-CM

## 2023-03-06 PROCEDURE — 36415 COLL VENOUS BLD VENIPUNCTURE: CPT

## 2023-03-06 PROCEDURE — 80197 ASSAY OF TACROLIMUS: CPT

## 2023-03-07 LAB — TACROLIMUS BLD-MCNC: NORMAL NG/ML

## 2023-03-15 ENCOUNTER — TRANSCRIBE ORDERS (OUTPATIENT)
Dept: ADMINISTRATIVE | Facility: HOSPITAL | Age: 68
End: 2023-03-15
Payer: COMMERCIAL

## 2023-03-15 ENCOUNTER — LAB (OUTPATIENT)
Dept: LAB | Facility: HOSPITAL | Age: 68
End: 2023-03-15
Payer: COMMERCIAL

## 2023-03-15 DIAGNOSIS — R06.02 SHORTNESS OF BREATH: ICD-10-CM

## 2023-03-15 DIAGNOSIS — Z94.1 HEART REPLACED BY TRANSPLANT: ICD-10-CM

## 2023-03-15 DIAGNOSIS — R06.02 SHORTNESS OF BREATH: Primary | ICD-10-CM

## 2023-03-15 LAB
ANION GAP SERPL CALCULATED.3IONS-SCNC: 13 MMOL/L (ref 5–15)
BUN SERPL-MCNC: 40 MG/DL (ref 8–23)
BUN/CREAT SERPL: 20.4 (ref 7–25)
CALCIUM SPEC-SCNC: 8.8 MG/DL (ref 8.6–10.5)
CHLORIDE SERPL-SCNC: 101 MMOL/L (ref 98–107)
CO2 SERPL-SCNC: 24 MMOL/L (ref 22–29)
CREAT SERPL-MCNC: 1.96 MG/DL (ref 0.76–1.27)
EGFRCR SERPLBLD CKD-EPI 2021: 36.8 ML/MIN/1.73
GLUCOSE SERPL-MCNC: 121 MG/DL (ref 65–99)
NT-PROBNP SERPL-MCNC: 1042 PG/ML (ref 0–900)
POTASSIUM SERPL-SCNC: 4 MMOL/L (ref 3.5–5.2)
SODIUM SERPL-SCNC: 138 MMOL/L (ref 136–145)

## 2023-03-15 PROCEDURE — 83880 ASSAY OF NATRIURETIC PEPTIDE: CPT

## 2023-03-15 PROCEDURE — 36415 COLL VENOUS BLD VENIPUNCTURE: CPT

## 2023-03-15 PROCEDURE — 80048 BASIC METABOLIC PNL TOTAL CA: CPT

## 2023-08-11 ENCOUNTER — TRANSCRIBE ORDERS (OUTPATIENT)
Dept: ADMINISTRATIVE | Facility: HOSPITAL | Age: 68
End: 2023-08-11
Payer: MEDICARE

## 2023-08-11 DIAGNOSIS — R09.89 DIMINISHED PULSE: Primary | ICD-10-CM

## 2023-08-22 ENCOUNTER — HOSPITAL ENCOUNTER (OUTPATIENT)
Dept: CARDIOLOGY | Facility: HOSPITAL | Age: 68
Discharge: HOME | End: 2023-08-22
Admitting: INTERNAL MEDICINE
Payer: MEDICARE

## 2023-08-22 DIAGNOSIS — R09.89 DIMINISHED PULSE: ICD-10-CM

## 2023-08-22 LAB
BH CV UPPER ARTERIAL LEFT 2ND DIGIT SYS MAX: 149
BH CV UPPER ARTERIAL LEFT FBI 2ND DIGIT RATIO: 0.9
BH CV UPPER ARTERIAL LEFT WBI RATIO: 1.05
BH CV UPPER ARTERIAL RIGHT 2ND DIGIT SYS MAX: 153
BH CV UPPER ARTERIAL RIGHT FBI 2ND DIGIT RATIO: 0.92
BH CV UPPER ARTERIAL RIGHT WBI RATIO: 1.06
UPPER ARTERIAL LEFT ARM BRACHIAL SYS MAX: 165 MMHG
UPPER ARTERIAL LEFT ARM RADIAL SYS MAX: 174 MMHG
UPPER ARTERIAL LEFT ARM ULNAR SYS MAX: 163 MMHG
UPPER ARTERIAL RIGHT ARM BRACHIAL SYS MAX: 166 MMHG
UPPER ARTERIAL RIGHT ARM RADIAL SYS MAX: 175 MMHG
UPPER ARTERIAL RIGHT ARM ULNAR SYS MAX: 176 MMHG

## 2023-08-22 PROCEDURE — 93922 UPR/L XTREMITY ART 2 LEVELS: CPT

## 2024-02-20 ENCOUNTER — LAB (OUTPATIENT)
Dept: LAB | Facility: HOSPITAL | Age: 69
End: 2024-02-20
Payer: MEDICARE

## 2024-02-20 ENCOUNTER — TRANSCRIBE ORDERS (OUTPATIENT)
Dept: ADMINISTRATIVE | Facility: HOSPITAL | Age: 69
End: 2024-02-20
Payer: MEDICARE

## 2024-02-20 DIAGNOSIS — R06.02 SHORTNESS OF BREATH: ICD-10-CM

## 2024-02-20 DIAGNOSIS — R06.02 SHORTNESS OF BREATH: Primary | ICD-10-CM

## 2024-02-20 LAB — NT-PROBNP SERPL-MCNC: 793 PG/ML (ref 0–900)

## 2024-02-20 PROCEDURE — 36415 COLL VENOUS BLD VENIPUNCTURE: CPT

## 2024-02-20 PROCEDURE — 83880 ASSAY OF NATRIURETIC PEPTIDE: CPT

## 2024-03-20 NOTE — PROGRESS NOTES
"Patient Care Team:  Enmanuel Renae MD as PCP - General (Internal Medicine)  Katelynn Figueroa MD as Consulting Physician (Endocrinology)  Billy Cortez OD as Consulting Physician (Optometry)  Wm Deleon MD as Referring Physician (Cardiology)    Chief Complaint: Follow up acute systolic and diastolic heart failure    Interval History:   Diuresing well. Some lows on SBP upper 80's.  Denies syncope or near syncope. Denies any chest pain , pnd, orthopnea, or SHOB. + Dry Cough noted.  MRI 10/12 showing no previous MI or infiltrative process.  EF 44% on MRI    Objective   Vital Signs  Temp:  [97.3 °F (36.3 °C)-98 °F (36.7 °C)] 97.9 °F (36.6 °C)  Heart Rate:  [66-78] 78  Resp:  [16-20] 16  BP: ()/(55-70) 89/57    Intake/Output Summary (Last 24 hours) at 10/13/18 1154  Last data filed at 10/13/18 0635   Gross per 24 hour   Intake              420 ml   Output             2300 ml   Net            -1880 ml     Date/Time  Weight   10/13/18 0500  85.9 kg (189 lb 7 oz)   10/12/18 0500  87 kg (191 lb 12.8 oz)   10/11/18 0500  88 kg (194 lb)   10/10/18 1005  90 kg (198 lb 8 oz)         Flowsheet Rows      First Filed Value   Admission Height  180.3 cm (71\") Documented at 10/10/2018 1001   Admission Weight  90 kg (198 lb 8 oz) Documented at 10/10/2018 1005          General Appearance:    Alert, cooperative, in no acute distress   Head:    Normocephalic, without obvious abnormality, atraumatic       Neck:   No adenopathy, supple, no thyromegaly, no carotid bruit, no    JVD; Neg HJR   Lungs:     Clear to auscultation bilaterally, no wheezes, rales, or     rhonchi    Heart:    Normal rate, regular rhythm,  No murmur, rub; + S gallop   Chest Wall:    No abnormalities observed   Abdomen:     Normal bowel sounds, soft, non-tender, non-distended,            no rebound tenderness   Extremities:   No cyanosis, clubbing, or edema   Pulses:   Pulses palpable and equal bilaterally   Skin:   No bleeding or " comfortable appearance/cooperative rash               atorvastatin 20 mg Oral Daily   carvedilol 3.125 mg Oral BID   cefdinir 300 mg Oral Q12H   enoxaparin 40 mg Subcutaneous Q24H   fluticasone 2 spray Nasal Daily   furosemide 80 mg Intravenous Q8H   glipiZIDE 5 mg Oral QAM AC   influenza vaccine 0.5 mL Intramuscular Once   insulin aspart 0-7 Units Subcutaneous 4x Daily With Meals & Nightly   sacubitril-valsartan 1 tablet Oral Q12H            Results Review:      Results from last 7 days  Lab Units 10/13/18  0333   SODIUM mmol/L 140   POTASSIUM mmol/L 3.3*   CHLORIDE mmol/L 99   CO2 mmol/L 28.1   BUN mg/dL 20   CREATININE mg/dL 0.81   GLUCOSE mg/dL 149*   CALCIUM mg/dL 8.5*           Results from last 7 days  Lab Units 10/11/18  0336   WBC 10*3/mm3 7.17   HEMOGLOBIN g/dL 12.7*   HEMATOCRIT % 40.8   PLATELETS 10*3/mm3 179                       Study Result     Cardiac MRI  10/12/18      The pulmonic valve is normal in structure and function.  The tricuspid valve is normal in structure and function.  The mitral valve is normal in structure and function.  The aortic valve is normal in structure and function.     There is moderate left ventricular hypertrophy with mildly decreased left ventricular systolic function.  Ejection fraction calculated at 44%.  The right ventricle is normal in size.  The right ventricular free wall is mildly thickened with mildly decreased systolic function.  The left and right atria are normal in size.   There is a trivial pericardial effusion.  The aortic root is normal in size.      There is normal perfusion of the myocardium.     The coronary arteries not well visualized.     Left ventricular end-diastolic diameter 4.7cm.   Left ventricular end-systolic diameter 3.3cm.  Left ventricular end-diastolic volume 162mL.  Left ventricular end-systolic volume 92mL.  Left Ventricular Ejection fraction 44%.  Left VentricularStroke volume 72mL.  Anterior septal wall thickness 1.8cm.  Posterior wall thickness 1.3cm.     Right  ventricular end-diastolic volume 189 mL.  Right ventricular end-systolic volume 117 mL.  Right ventricular stroke volume 71mL.  Right ventricular ejection fraction 38%.         Conclusion:  1.  There is moderate left ventricular hypertrophy.  2.  There is mildly decreased left ventricular systolic function with an ejection fraction of 44%.  3.  There is normal right ventricular size.  4.  There is mild left ventricular decrease in systolic function.  5.  There is normal perfusion of the myocardium  6.  There was suboptimal nulling of the myocardium but with diffuse late gadolinium enhancement.        Rachel Beatty MD  10/12/18       Conclusion     Interventionalist: Wm Deleon M.D., F.A.C.C.     Procedure performed:  1.  Coronary angiography  2.  Left heart catheterization  3.  Left ventricular angiography  4.  Right heart catheterization     Indication:  Acute systolic heart failure     Referring: Pancho         Procedure findings:  Left main: Large-caliber vessel that bifurcates to an LAD and circumflex.  Normal  LAD: Medium caliber vessel that gives rise to a small caliber diagonal branch.  Normal  LCX: Medium caliber vessel that gives rise to a medium caliber OM1 and OM 2.  Normal  RCA: Large caliber dominant vessel that gives rise to a medium caliber PDA and small caliber RPL.  Normal     Left ventricular angiography: Normal left ventricular size.  Mildly reduced left ventricular systolic function.  Ejection fraction 40%.  Inferior wall hypokinesis     Hemodynamics:   LV: 103/32  AO: 103/69     RA: 23/24/20  RV: 94/18  PA: 92/40/55  Wedge: 40/50/34     Saturations:   SVC 58%  PA 58%  AO 94%     Estimated blood loss: Minimal     Complications: None     Conclusions:   1.  Normal coronary angiography  2.  Severely elevated right and left-sided filling pressure  3.  Severe pulmonary hypertension        Recommendations: I'm actually recommending we admit this gentleman for IV diuresis, titration of  heart failure medications, hematology and pulmonary consultation.           Interpretation Summary echo 9/19/18    · Calculated EF = 42%. Estimated EF was in agreement with the calculated EF. Estimated EF = 42%. Global Longitudinal LV strain = -10.7%. Strain data was reviewed and speckle tracking was considered accurate. The global longitudinal strain is markedly abnormal.  · Normal left ventricular cavity size noted. There is left ventricular global hypokinesis noted. Left ventricular wall thickness is consistent with moderate concentric hypertrophy. The findings are consistent with infiltrative cardiomyopathy. There is an echo bright appearance to the left and right ventricular myocardium suspicious for amyloid heart disease. . Left ventricular diastolic function was indeterminate. Elevated left atrial pressure.  · Normal right ventricular cavity size noted. Right ventricular wall thickness is consistent with moderate hypertrophy. Borderline low right ventricular systolic function noted.  · Left atrial volume is mildly increased.  · Right atrial cavity size is mildly dilated  · The valve was poorly visualized but appears trileaflet. The aortic valve is abnormal in structure. There is mild thickening of the aortic valve.  · Mild-to-moderate mitral valve regurgitation is present  · Mild-to-moderate mitral valve regurgitation is present.  · Mild to moderate tricuspid valve regurgitation is present. Estimated right ventricular systolic pressure from tricuspid regurgitation is markedly elevated (>55 mmHg). Calculated right ventricular systolic pressure from tricuspid regurgitation is 67 mmHg.  · There is a trivial pericardial effusion.       10/11/18 EKG        10/11/18      Tele:         I reviewed the patient's new clinical results.  I personally viewed and interpreted the patient's EKG/Telemetry data        Assessment/Plan   1.  Acute systolic and diastolic heart failure: EF 44%.  Is on BB and Entresto  2.   Pulmonary hypertension: Severe  3.  Left ventricular hypertrophy: moderate  4.  Essential hypertension: controlled some lows, monitor.   5.  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  Pending 24 hour urine studies.  To be f/u on as oupt.    6.  Hypokalemia: replacement ordered.      -Recommend continuing current dose of carvedilol and Entresto  -Continue IV diuresis.  He is tolerating well.  Renal function is stable.  Some SBP upper 80's but asymptomatic.  Will place hold parameters on his entresto and coreg in order to continue diuresing in preparation for RHC.   -Heme has reviewed his imaging and there are no evidence of lytic lesions and are recommending f/u in their office to review monoclonal proteins studies.    -24 hour urine is pending  -Cardiac MRI 10/12/18 : reviewed with Dr. Hansen.  No evidence of myocardial scar or infiltrative process.     -Plan repeat RHC when euvolemic. Possibly Monday or Tues.      BMP and Mag in am    35 min spent in total spent at bedside, in chart review, and in coordination of care with other team members.     ERIN Ferguson  Weekend cardiology coverage  10/13/18 1257 pm

## 2025-07-17 ENCOUNTER — TRANSCRIBE ORDERS (OUTPATIENT)
Dept: LAB | Facility: HOSPITAL | Age: 70
End: 2025-07-17
Payer: MEDICARE

## 2025-07-17 ENCOUNTER — LAB (OUTPATIENT)
Dept: LAB | Facility: HOSPITAL | Age: 70
End: 2025-07-17
Payer: MEDICARE

## 2025-07-17 DIAGNOSIS — E85.81 LIGHT CHAIN (AL) AMYLOIDOSIS: Primary | ICD-10-CM

## 2025-07-17 DIAGNOSIS — E85.9 AMYLOIDOSIS, UNSPECIFIED TYPE: ICD-10-CM

## 2025-07-17 DIAGNOSIS — E85.81 LIGHT CHAIN (AL) AMYLOIDOSIS: ICD-10-CM

## 2025-07-17 LAB — NT-PROBNP SERPL-MCNC: 1837 PG/ML (ref 0–900)

## 2025-07-17 PROCEDURE — 36415 COLL VENOUS BLD VENIPUNCTURE: CPT

## 2025-07-17 PROCEDURE — 83880 ASSAY OF NATRIURETIC PEPTIDE: CPT

## 2025-07-18 ENCOUNTER — LAB (OUTPATIENT)
Dept: LAB | Facility: HOSPITAL | Age: 70
End: 2025-07-18
Payer: MEDICARE

## 2025-07-18 ENCOUNTER — TRANSCRIBE ORDERS (OUTPATIENT)
Dept: LAB | Facility: HOSPITAL | Age: 70
End: 2025-07-18
Payer: MEDICARE

## 2025-07-18 DIAGNOSIS — E85.81 LIGHT CHAIN (AL) AMYLOIDOSIS: Primary | ICD-10-CM

## 2025-07-18 DIAGNOSIS — E85.81 LIGHT CHAIN (AL) AMYLOIDOSIS: ICD-10-CM

## 2025-07-18 PROCEDURE — 83880 ASSAY OF NATRIURETIC PEPTIDE: CPT

## 2025-07-19 LAB — BNP SERPL-MCNC: 269.3 PG/ML (ref 0–100)

## (undated) DEVICE — CATH DIAG IMPULSE PIG 5F 100CM

## (undated) DEVICE — CATH DIAG IMPULSE FL3.5 5F 100CM

## (undated) DEVICE — GW EMR FIX EXCHG J STD .035 3MM 260CM

## (undated) DEVICE — GLIDESHEATH BASIC HYDROPHILIC COATED INTRODUCER SHEATH: Brand: GLIDESHEATH

## (undated) DEVICE — BALN PRESS WEDGE 5F 110CM

## (undated) DEVICE — CATH DIAG IMPULSE FR4 5F 100CM

## (undated) DEVICE — KT MANIFLD CARDIAC

## (undated) DEVICE — HI-TORQUE BALANCE MIDDLEWEIGHT GUIDE WIRE .014 STRAIGHT TIP 3.0 CM X 190 CM: Brand: HI-TORQUE BALANCE MIDDLEWEIGHT

## (undated) DEVICE — PK CATH CARD 40